# Patient Record
Sex: FEMALE | Race: WHITE
[De-identification: names, ages, dates, MRNs, and addresses within clinical notes are randomized per-mention and may not be internally consistent; named-entity substitution may affect disease eponyms.]

---

## 2018-04-01 ENCOUNTER — HOSPITAL ENCOUNTER (EMERGENCY)
Dept: HOSPITAL 8 - ED | Age: 58
Discharge: HOME | End: 2018-04-01
Payer: COMMERCIAL

## 2018-04-01 VITALS — HEIGHT: 64 IN | WEIGHT: 189.6 LBS | BODY MASS INDEX: 32.37 KG/M2

## 2018-04-01 VITALS — DIASTOLIC BLOOD PRESSURE: 90 MMHG | SYSTOLIC BLOOD PRESSURE: 154 MMHG

## 2018-04-01 DIAGNOSIS — B02.33: Primary | ICD-10-CM

## 2018-04-01 DIAGNOSIS — K21.9: ICD-10-CM

## 2018-04-01 DIAGNOSIS — Z85.3: ICD-10-CM

## 2018-04-01 DIAGNOSIS — J44.9: ICD-10-CM

## 2018-04-01 DIAGNOSIS — Z87.891: ICD-10-CM

## 2018-04-01 PROCEDURE — 99283 EMERGENCY DEPT VISIT LOW MDM: CPT

## 2018-08-15 ENCOUNTER — HOSPITAL ENCOUNTER (OUTPATIENT)
Dept: HOSPITAL 8 - CFH | Age: 58
Discharge: HOME | End: 2018-08-15
Attending: NURSE PRACTITIONER
Payer: COMMERCIAL

## 2018-08-15 DIAGNOSIS — M79.604: Primary | ICD-10-CM

## 2018-08-15 DIAGNOSIS — R60.0: ICD-10-CM

## 2018-08-16 ENCOUNTER — HOSPITAL ENCOUNTER (OUTPATIENT)
Dept: HOSPITAL 8 - RAD | Age: 58
Discharge: HOME | End: 2018-08-16
Attending: NURSE PRACTITIONER
Payer: COMMERCIAL

## 2018-08-16 DIAGNOSIS — M84.451A: Primary | ICD-10-CM

## 2018-08-16 DIAGNOSIS — M79.89: ICD-10-CM

## 2018-08-16 DIAGNOSIS — F17.200: ICD-10-CM

## 2018-08-16 DIAGNOSIS — R59.0: ICD-10-CM

## 2018-08-16 DIAGNOSIS — Z88.0: ICD-10-CM

## 2018-08-16 DIAGNOSIS — Z88.2: ICD-10-CM

## 2018-08-16 PROCEDURE — 72193 CT PELVIS W/DYE: CPT

## 2018-08-20 ENCOUNTER — HOSPITAL ENCOUNTER (OUTPATIENT)
Dept: HOSPITAL 8 - OUT | Age: 58
Discharge: HOME | End: 2018-08-20
Attending: NURSE PRACTITIONER
Payer: COMMERCIAL

## 2018-08-20 VITALS — SYSTOLIC BLOOD PRESSURE: 131 MMHG | DIASTOLIC BLOOD PRESSURE: 68 MMHG

## 2018-08-20 VITALS — HEIGHT: 64 IN | WEIGHT: 195.99 LBS | BODY MASS INDEX: 33.46 KG/M2

## 2018-08-20 DIAGNOSIS — Z88.2: ICD-10-CM

## 2018-08-20 DIAGNOSIS — C83.35: Primary | ICD-10-CM

## 2018-08-20 DIAGNOSIS — Z98.890: ICD-10-CM

## 2018-08-20 DIAGNOSIS — F17.210: ICD-10-CM

## 2018-08-20 DIAGNOSIS — Z88.0: ICD-10-CM

## 2018-08-20 DIAGNOSIS — Z79.899: ICD-10-CM

## 2018-08-20 DIAGNOSIS — Z88.8: ICD-10-CM

## 2018-08-20 PROCEDURE — 88305 TISSUE EXAM BY PATHOLOGIST: CPT

## 2018-08-20 PROCEDURE — 88342 IMHCHEM/IMCYTCHM 1ST ANTB: CPT

## 2018-08-20 PROCEDURE — 88341 IMHCHEM/IMCYTCHM EA ADD ANTB: CPT

## 2018-08-20 PROCEDURE — G0461 IMMUNOHISTO/CYTO CHEM 1ST ST: HCPCS

## 2018-08-20 PROCEDURE — 99156 MOD SED OTH PHYS/QHP 5/>YRS: CPT

## 2018-08-20 PROCEDURE — 99157 MOD SED OTHER PHYS/QHP EA: CPT

## 2018-08-20 PROCEDURE — 88360 TUMOR IMMUNOHISTOCHEM/MANUAL: CPT

## 2018-08-20 PROCEDURE — 76942 ECHO GUIDE FOR BIOPSY: CPT

## 2018-08-20 PROCEDURE — 20206 BIOPSY MUSCLE PERQ NEEDLE: CPT

## 2018-08-30 ENCOUNTER — HOSPITAL ENCOUNTER (INPATIENT)
Dept: HOSPITAL 8 - ED | Age: 58
LOS: 55 days | Discharge: HOME | DRG: 480 | End: 2018-10-24
Attending: FAMILY MEDICINE | Admitting: INTERNAL MEDICINE
Payer: COMMERCIAL

## 2018-08-30 VITALS — HEIGHT: 64 IN | BODY MASS INDEX: 30.3 KG/M2 | WEIGHT: 177.47 LBS

## 2018-08-30 DIAGNOSIS — G89.29: ICD-10-CM

## 2018-08-30 DIAGNOSIS — E83.52: ICD-10-CM

## 2018-08-30 DIAGNOSIS — Z90.710: ICD-10-CM

## 2018-08-30 DIAGNOSIS — M84.451A: Primary | ICD-10-CM

## 2018-08-30 DIAGNOSIS — C50.919: ICD-10-CM

## 2018-08-30 DIAGNOSIS — Z88.2: ICD-10-CM

## 2018-08-30 DIAGNOSIS — I89.0: ICD-10-CM

## 2018-08-30 DIAGNOSIS — Z88.0: ICD-10-CM

## 2018-08-30 DIAGNOSIS — C83.35: ICD-10-CM

## 2018-08-30 DIAGNOSIS — E87.1: ICD-10-CM

## 2018-08-30 DIAGNOSIS — B37.3: ICD-10-CM

## 2018-08-30 DIAGNOSIS — B95.7: ICD-10-CM

## 2018-08-30 DIAGNOSIS — R91.8: ICD-10-CM

## 2018-08-30 DIAGNOSIS — T45.1X5A: ICD-10-CM

## 2018-08-30 DIAGNOSIS — C83.39: ICD-10-CM

## 2018-08-30 DIAGNOSIS — M85.80: ICD-10-CM

## 2018-08-30 DIAGNOSIS — E87.8: ICD-10-CM

## 2018-08-30 DIAGNOSIS — B96.20: ICD-10-CM

## 2018-08-30 DIAGNOSIS — D63.8: ICD-10-CM

## 2018-08-30 DIAGNOSIS — Z16.12: ICD-10-CM

## 2018-08-30 DIAGNOSIS — M79.89: ICD-10-CM

## 2018-08-30 DIAGNOSIS — K21.9: ICD-10-CM

## 2018-08-30 DIAGNOSIS — T81.49XA: ICD-10-CM

## 2018-08-30 DIAGNOSIS — C82.90: ICD-10-CM

## 2018-08-30 DIAGNOSIS — T45.8X5A: ICD-10-CM
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
## 2018-08-30 DIAGNOSIS — C79.51: ICD-10-CM

## 2018-08-30 DIAGNOSIS — J45.909: ICD-10-CM

## 2018-08-30 DIAGNOSIS — Z85.3: ICD-10-CM
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
## 2018-08-30 DIAGNOSIS — D62: ICD-10-CM

## 2018-08-30 DIAGNOSIS — T50.995A: ICD-10-CM

## 2018-08-30 DIAGNOSIS — F41.9: ICD-10-CM

## 2018-08-30 DIAGNOSIS — N17.0: ICD-10-CM

## 2018-08-30 DIAGNOSIS — M16.11: ICD-10-CM

## 2018-08-30 DIAGNOSIS — E87.0: ICD-10-CM

## 2018-08-30 DIAGNOSIS — Z91.19: ICD-10-CM

## 2018-08-30 DIAGNOSIS — D61.818: ICD-10-CM

## 2018-08-30 DIAGNOSIS — Z82.49: ICD-10-CM

## 2018-08-30 DIAGNOSIS — L03.115: ICD-10-CM

## 2018-08-30 DIAGNOSIS — B95.2: ICD-10-CM

## 2018-08-30 DIAGNOSIS — Z15.01: ICD-10-CM

## 2018-08-30 DIAGNOSIS — F17.210: ICD-10-CM

## 2018-08-30 DIAGNOSIS — Z83.3: ICD-10-CM

## 2018-08-30 DIAGNOSIS — Z87.311: ICD-10-CM

## 2018-08-30 DIAGNOSIS — E83.42: ICD-10-CM

## 2018-08-30 DIAGNOSIS — E87.5: ICD-10-CM

## 2018-08-30 DIAGNOSIS — Z80.42: ICD-10-CM

## 2018-08-30 DIAGNOSIS — Z86.19: ICD-10-CM

## 2018-08-30 DIAGNOSIS — D64.81: ICD-10-CM

## 2018-08-30 DIAGNOSIS — E43: ICD-10-CM

## 2018-08-30 DIAGNOSIS — F32.9: ICD-10-CM

## 2018-08-30 DIAGNOSIS — Z92.3: ICD-10-CM

## 2018-08-30 LAB
ALBUMIN SERPL-MCNC: 3.4 G/DL (ref 3.4–5)
ANION GAP SERPL CALC-SCNC: 9 MMOL/L (ref 5–15)
APTT BLD: 24 SECONDS (ref 25–31)
BASOPHILS # BLD AUTO: 0.01 X10^3/UL (ref 0–0.1)
BASOPHILS NFR BLD AUTO: 0 % (ref 0–1)
CALCIUM SERPL-MCNC: 12.4 MG/DL (ref 8.5–10.1)
CHLORIDE SERPL-SCNC: 102 MMOL/L (ref 98–107)
CREAT SERPL-MCNC: 1.78 MG/DL (ref 0.55–1.02)
EOSINOPHIL # BLD AUTO: 0.07 X10^3/UL (ref 0–0.4)
EOSINOPHIL NFR BLD AUTO: 2 % (ref 1–7)
ERYTHROCYTE [DISTWIDTH] IN BLOOD BY AUTOMATED COUNT: 14.5 % (ref 9.6–15.2)
INR PPP: 1.1 (ref 0.93–1.1)
LYMPHOCYTES # BLD AUTO: 0.37 X10^3/UL (ref 1–3.4)
LYMPHOCYTES NFR BLD AUTO: 11 % (ref 22–44)
MCH RBC QN AUTO: 34.3 PG (ref 27–34.8)
MCHC RBC AUTO-ENTMCNC: 34.9 G/DL (ref 32.4–35.8)
MCV RBC AUTO: 98.2 FL (ref 80–100)
MD: NO
MONOCYTES # BLD AUTO: 0.08 X10^3/UL (ref 0.2–0.8)
MONOCYTES NFR BLD AUTO: 2 % (ref 2–9)
NEUTROPHILS # BLD AUTO: 3 X10^3/UL (ref 1.8–6.8)
NEUTROPHILS NFR BLD AUTO: 85 % (ref 42–75)
PLATELET # BLD AUTO: 210 X10^3/UL (ref 130–400)
PMV BLD AUTO: 7.6 FL (ref 7.4–10.4)
PROTHROMBIN TIME: 11.3 SECONDS (ref 9.6–11.5)
RBC # BLD AUTO: 3.04 X10^6/UL (ref 3.82–5.3)

## 2018-08-30 PROCEDURE — 82272 OCCULT BLD FECES 1-3 TESTS: CPT

## 2018-08-30 PROCEDURE — 85610 PROTHROMBIN TIME: CPT

## 2018-08-30 PROCEDURE — 82550 ASSAY OF CK (CPK): CPT

## 2018-08-30 PROCEDURE — 99156 MOD SED OTH PHYS/QHP 5/>YRS: CPT

## 2018-08-30 PROCEDURE — 76937 US GUIDE VASCULAR ACCESS: CPT

## 2018-08-30 PROCEDURE — 76001: CPT

## 2018-08-30 PROCEDURE — 83615 LACTATE (LD) (LDH) ENZYME: CPT

## 2018-08-30 PROCEDURE — C1713 ANCHOR/SCREW BN/BN,TIS/BN: HCPCS

## 2018-08-30 PROCEDURE — 88311 DECALCIFY TISSUE: CPT

## 2018-08-30 PROCEDURE — 87205 SMEAR GRAM STAIN: CPT

## 2018-08-30 PROCEDURE — 88264 CHROMOSOME ANALYSIS 20-25: CPT

## 2018-08-30 PROCEDURE — 84439 ASSAY OF FREE THYROXINE: CPT

## 2018-08-30 PROCEDURE — 87186 SC STD MICRODIL/AGAR DIL: CPT

## 2018-08-30 PROCEDURE — 36561 INSERT TUNNELED CV CATH: CPT

## 2018-08-30 PROCEDURE — 85025 COMPLETE CBC W/AUTO DIFF WBC: CPT

## 2018-08-30 PROCEDURE — 72170 X-RAY EXAM OF PELVIS: CPT

## 2018-08-30 PROCEDURE — 38222 DX BONE MARROW BX & ASPIR: CPT

## 2018-08-30 PROCEDURE — 87070 CULTURE OTHR SPECIMN AEROBIC: CPT

## 2018-08-30 PROCEDURE — 88342 IMHCHEM/IMCYTCHM 1ST ANTB: CPT

## 2018-08-30 PROCEDURE — 86900 BLOOD TYPING SEROLOGIC ABO: CPT

## 2018-08-30 PROCEDURE — 84550 ASSAY OF BLOOD/URIC ACID: CPT

## 2018-08-30 PROCEDURE — 71250 CT THORAX DX C-: CPT

## 2018-08-30 PROCEDURE — 96374 THER/PROPH/DIAG INJ IV PUSH: CPT

## 2018-08-30 PROCEDURE — 85097 BONE MARROW INTERPRETATION: CPT

## 2018-08-30 PROCEDURE — S0028 INJECTION, FAMOTIDINE, 20 MG: HCPCS

## 2018-08-30 PROCEDURE — 80048 BASIC METABOLIC PNL TOTAL CA: CPT

## 2018-08-30 PROCEDURE — 86706 HEP B SURFACE ANTIBODY: CPT

## 2018-08-30 PROCEDURE — 86140 C-REACTIVE PROTEIN: CPT

## 2018-08-30 PROCEDURE — 83550 IRON BINDING TEST: CPT

## 2018-08-30 PROCEDURE — 73552 X-RAY EXAM OF FEMUR 2/>: CPT

## 2018-08-30 PROCEDURE — 73590 X-RAY EXAM OF LOWER LEG: CPT

## 2018-08-30 PROCEDURE — 85651 RBC SED RATE NONAUTOMATED: CPT

## 2018-08-30 PROCEDURE — 73501 X-RAY EXAM HIP UNI 1 VIEW: CPT

## 2018-08-30 PROCEDURE — 85730 THROMBOPLASTIN TIME PARTIAL: CPT

## 2018-08-30 PROCEDURE — 88341 IMHCHEM/IMCYTCHM EA ADD ANTB: CPT

## 2018-08-30 PROCEDURE — 86803 HEPATITIS C AB TEST: CPT

## 2018-08-30 PROCEDURE — 99157 MOD SED OTHER PHYS/QHP EA: CPT

## 2018-08-30 PROCEDURE — 84100 ASSAY OF PHOSPHORUS: CPT

## 2018-08-30 PROCEDURE — P9040 RBC LEUKOREDUCED IRRADIATED: HCPCS

## 2018-08-30 PROCEDURE — 86708 HEPATITIS A ANTIBODY: CPT

## 2018-08-30 PROCEDURE — 62270 DX LMBR SPI PNXR: CPT

## 2018-08-30 PROCEDURE — 84443 ASSAY THYROID STIM HORMONE: CPT

## 2018-08-30 PROCEDURE — 77001 FLUOROGUIDE FOR VEIN DEVICE: CPT

## 2018-08-30 PROCEDURE — 88307 TISSUE EXAM BY PATHOLOGIST: CPT

## 2018-08-30 PROCEDURE — 87077 CULTURE AEROBIC IDENTIFY: CPT

## 2018-08-30 PROCEDURE — 88237 TISSUE CULTURE BONE MARROW: CPT

## 2018-08-30 PROCEDURE — G0461 IMMUNOHISTO/CYTO CHEM 1ST ST: HCPCS

## 2018-08-30 PROCEDURE — 88313 SPECIAL STAINS GROUP 2: CPT

## 2018-08-30 PROCEDURE — 96376 TX/PRO/DX INJ SAME DRUG ADON: CPT

## 2018-08-30 PROCEDURE — 88280 CHROMOSOME KARYOTYPE STUDY: CPT

## 2018-08-30 PROCEDURE — 99285 EMERGENCY DEPT VISIT HI MDM: CPT

## 2018-08-30 PROCEDURE — 77012 CT SCAN FOR NEEDLE BIOPSY: CPT

## 2018-08-30 PROCEDURE — 88305 TISSUE EXAM BY PATHOLOGIST: CPT

## 2018-08-30 PROCEDURE — C1788 PORT, INDWELLING, IMP: HCPCS

## 2018-08-30 PROCEDURE — 87040 BLOOD CULTURE FOR BACTERIA: CPT

## 2018-08-30 PROCEDURE — 87340 HEPATITIS B SURFACE AG IA: CPT

## 2018-08-30 PROCEDURE — 88108 CYTOPATH CONCENTRATE TECH: CPT

## 2018-08-30 PROCEDURE — 93005 ELECTROCARDIOGRAM TRACING: CPT

## 2018-08-30 PROCEDURE — 86704 HEP B CORE ANTIBODY TOTAL: CPT

## 2018-08-30 PROCEDURE — 82040 ASSAY OF SERUM ALBUMIN: CPT

## 2018-08-30 PROCEDURE — 36415 COLL VENOUS BLD VENIPUNCTURE: CPT

## 2018-08-30 PROCEDURE — 93306 TTE W/DOPPLER COMPLETE: CPT

## 2018-08-30 PROCEDURE — 80053 COMPREHEN METABOLIC PANEL: CPT

## 2018-08-30 PROCEDURE — 36430 TRANSFUSION BLD/BLD COMPNT: CPT

## 2018-08-30 PROCEDURE — 86850 RBC ANTIBODY SCREEN: CPT

## 2018-08-30 PROCEDURE — 85060 BLOOD SMEAR INTERPRETATION: CPT

## 2018-08-30 PROCEDURE — 86923 COMPATIBILITY TEST ELECTRIC: CPT

## 2018-08-30 PROCEDURE — 82330 ASSAY OF CALCIUM: CPT

## 2018-08-30 PROCEDURE — 83540 ASSAY OF IRON: CPT

## 2018-08-30 PROCEDURE — 83735 ASSAY OF MAGNESIUM: CPT

## 2018-08-30 PROCEDURE — 71045 X-RAY EXAM CHEST 1 VIEW: CPT

## 2018-08-30 PROCEDURE — 74176 CT ABD & PELVIS W/O CONTRAST: CPT

## 2018-08-30 RX ADMIN — MORPHINE SULFATE PRN MG: 4 INJECTION INTRAVENOUS at 22:23

## 2018-08-30 RX ADMIN — MORPHINE SULFATE PRN MG: 4 INJECTION INTRAVENOUS at 23:16

## 2018-08-31 VITALS — DIASTOLIC BLOOD PRESSURE: 70 MMHG | SYSTOLIC BLOOD PRESSURE: 158 MMHG

## 2018-08-31 VITALS — DIASTOLIC BLOOD PRESSURE: 74 MMHG | SYSTOLIC BLOOD PRESSURE: 165 MMHG

## 2018-08-31 VITALS — SYSTOLIC BLOOD PRESSURE: 155 MMHG | DIASTOLIC BLOOD PRESSURE: 78 MMHG

## 2018-08-31 VITALS — SYSTOLIC BLOOD PRESSURE: 148 MMHG | DIASTOLIC BLOOD PRESSURE: 81 MMHG

## 2018-08-31 VITALS — DIASTOLIC BLOOD PRESSURE: 73 MMHG | SYSTOLIC BLOOD PRESSURE: 152 MMHG

## 2018-08-31 VITALS — SYSTOLIC BLOOD PRESSURE: 186 MMHG | DIASTOLIC BLOOD PRESSURE: 90 MMHG

## 2018-08-31 LAB
% IRON SATURATION: 23 % (ref 20–55)
ALBUMIN SERPL-MCNC: 3.1 G/DL (ref 3.4–5)
ALP SERPL-CCNC: 75 U/L (ref 45–117)
ALT SERPL-CCNC: 15 U/L (ref 12–78)
ANION GAP SERPL CALC-SCNC: 10 MMOL/L (ref 5–15)
BILIRUB SERPL-MCNC: 0.3 MG/DL (ref 0.2–1)
CALCIUM SERPL-MCNC: 12 MG/DL (ref 8.5–10.1)
CHLORIDE SERPL-SCNC: 103 MMOL/L (ref 98–107)
CREAT SERPL-MCNC: 1.6 MG/DL (ref 0.55–1.02)
IRON LEVEL: 56 MCG/DL (ref 50–170)
PROT SERPL-MCNC: 6.5 G/DL (ref 6.4–8.2)
T4 FREE SERPL-MCNC: 1.31 NG/DL (ref 0.76–1.46)
TIBC SERPL-MCNC: 245 MCG/DL (ref 250–450)

## 2018-08-31 PROCEDURE — 0QS606Z REPOSITION RIGHT UPPER FEMUR WITH INTRAMEDULLARY INTERNAL FIXATION DEVICE, OPEN APPROACH: ICD-10-PCS | Performed by: ORTHOPAEDIC SURGERY

## 2018-08-31 PROCEDURE — 07DR3ZX EXTRACTION OF ILIAC BONE MARROW, PERCUTANEOUS APPROACH, DIAGNOSTIC: ICD-10-PCS | Performed by: RADIOLOGY

## 2018-08-31 PROCEDURE — 07DR3ZZ EXTRACTION OF ILIAC BONE MARROW, PERCUTANEOUS APPROACH: ICD-10-PCS | Performed by: RADIOLOGY

## 2018-08-31 RX ADMIN — MORPHINE SULFATE PRN MG: 4 INJECTION INTRAVENOUS at 21:49

## 2018-08-31 RX ADMIN — MAGNESIUM OXIDE SCH MG: 400 TABLET ORAL at 11:53

## 2018-08-31 RX ADMIN — LACTULOSE SCH GM: 10 SOLUTION ORAL at 22:50

## 2018-08-31 RX ADMIN — KETOROLAC TROMETHAMINE PRN MG: 30 INJECTION, SOLUTION INTRAMUSCULAR at 04:32

## 2018-08-31 RX ADMIN — HYDROMORPHONE HYDROCHLORIDE PRN MG: 2 INJECTION INTRAMUSCULAR; INTRAVENOUS; SUBCUTANEOUS at 16:14

## 2018-08-31 RX ADMIN — MORPHINE SULFATE PRN MG: 4 INJECTION INTRAVENOUS at 21:57

## 2018-08-31 RX ADMIN — SODIUM CHLORIDE SCH MLS/HR: 0.9 INJECTION, SOLUTION INTRAVENOUS at 23:20

## 2018-08-31 RX ADMIN — HYDROMORPHONE HYDROCHLORIDE PRN MG: 2 INJECTION INTRAMUSCULAR; INTRAVENOUS; SUBCUTANEOUS at 10:05

## 2018-08-31 RX ADMIN — PANTOPRAZOLE SODIUM SCH MG: 40 TABLET, DELAYED RELEASE ORAL at 11:53

## 2018-08-31 RX ADMIN — PROMETHAZINE HYDROCHLORIDE PRN MG: 25 INJECTION INTRAMUSCULAR; INTRAVENOUS at 04:49

## 2018-08-31 RX ADMIN — FENTANYL CITRATE PRN MCG: 50 INJECTION INTRAMUSCULAR; INTRAVENOUS at 21:10

## 2018-08-31 RX ADMIN — HEPARIN SODIUM SCH UNITS: 5000 INJECTION, SOLUTION INTRAVENOUS; SUBCUTANEOUS at 16:12

## 2018-08-31 RX ADMIN — HEPARIN SODIUM SCH UNITS: 5000 INJECTION, SOLUTION INTRAVENOUS; SUBCUTANEOUS at 06:02

## 2018-08-31 RX ADMIN — MORPHINE SULFATE PRN MG: 4 INJECTION INTRAVENOUS at 21:41

## 2018-08-31 RX ADMIN — CHOLECALCIFEROL TAB 125 MCG (5000 UNIT) SCH UNIT: 125 TAB at 11:53

## 2018-08-31 RX ADMIN — ONDANSETRON PRN MG: 4 TABLET, ORALLY DISINTEGRATING ORAL at 04:27

## 2018-08-31 RX ADMIN — LACTULOSE SCH GM: 10 SOLUTION ORAL at 09:00

## 2018-08-31 RX ADMIN — ALLOPURINOL SCH MG: 100 TABLET ORAL at 11:52

## 2018-08-31 RX ADMIN — HYDROMORPHONE HYDROCHLORIDE PRN MG: 2 INJECTION INTRAMUSCULAR; INTRAVENOUS; SUBCUTANEOUS at 18:42

## 2018-08-31 RX ADMIN — KETOROLAC TROMETHAMINE PRN MG: 30 INJECTION, SOLUTION INTRAMUSCULAR at 13:38

## 2018-08-31 RX ADMIN — B-COMPLEX W/ C & FOLIC ACID TAB SCH TAB: TAB at 11:53

## 2018-08-31 RX ADMIN — PANTOPRAZOLE SODIUM SCH MG: 40 TABLET, DELAYED RELEASE ORAL at 22:47

## 2018-08-31 RX ADMIN — FENTANYL CITRATE PRN MCG: 50 INJECTION INTRAMUSCULAR; INTRAVENOUS at 21:05

## 2018-09-01 VITALS — DIASTOLIC BLOOD PRESSURE: 78 MMHG | SYSTOLIC BLOOD PRESSURE: 138 MMHG

## 2018-09-01 VITALS — DIASTOLIC BLOOD PRESSURE: 73 MMHG | SYSTOLIC BLOOD PRESSURE: 157 MMHG

## 2018-09-01 VITALS — SYSTOLIC BLOOD PRESSURE: 163 MMHG | DIASTOLIC BLOOD PRESSURE: 77 MMHG

## 2018-09-01 VITALS — SYSTOLIC BLOOD PRESSURE: 121 MMHG | DIASTOLIC BLOOD PRESSURE: 75 MMHG

## 2018-09-01 LAB
<PLATELET ESTIMATE>: ADEQUATE
<PLT MORPHOLOGY>: (no result)
ALBUMIN SERPL-MCNC: 2.7 G/DL (ref 3.4–5)
ALP SERPL-CCNC: 69 U/L (ref 45–117)
ALT SERPL-CCNC: 14 U/L (ref 12–78)
ANION GAP SERPL CALC-SCNC: 7 MMOL/L (ref 5–15)
ANISOCYTOSIS BLD QL SMEAR: (no result)
BASO STIPL BLD QL SMEAR: (no result)
BASOPHILS # BLD AUTO: 0 X10^3/UL (ref 0–0.1)
BASOPHILS NFR BLD AUTO: 0 % (ref 0–1)
BILIRUB SERPL-MCNC: 0.3 MG/DL (ref 0.2–1)
CALCIUM SERPL-MCNC: 11.4 MG/DL (ref 8.5–10.1)
CHLORIDE SERPL-SCNC: 107 MMOL/L (ref 98–107)
CREAT SERPL-MCNC: 1.33 MG/DL (ref 0.55–1.02)
EOSINOPHIL # BLD AUTO: 0 X10^3/UL (ref 0–0.4)
EOSINOPHIL NFR BLD AUTO: 0 % (ref 1–7)
ERYTHROCYTE [DISTWIDTH] IN BLOOD BY AUTOMATED COUNT: 13.7 % (ref 9.6–15.2)
LYMPHOCYTES # BLD AUTO: 0.22 X10^3/UL (ref 1–3.4)
LYMPHOCYTES NFR BLD AUTO: 8 % (ref 22–44)
MCH RBC QN AUTO: 33.7 PG (ref 27–34.8)
MCHC RBC AUTO-ENTMCNC: 34.5 G/DL (ref 32.4–35.8)
MCV RBC AUTO: 97.8 FL (ref 80–100)
MD: (no result)
MONOCYTES # BLD AUTO: 0.06 X10^3/UL (ref 0.2–0.8)
MONOCYTES NFR BLD AUTO: 2 % (ref 2–9)
NEUTROPHILS # BLD AUTO: 2.57 X10^3/UL (ref 1.8–6.8)
NEUTROPHILS NFR BLD AUTO: 90 % (ref 42–75)
OVALOCYTES BLD QL SMEAR: (no result)
PLATELET # BLD AUTO: 197 X10^3/UL (ref 130–400)
PMV BLD AUTO: 7.6 FL (ref 7.4–10.4)
POLYCHROMASIA BLD QL SMEAR: (no result)
PROT SERPL-MCNC: 5.8 G/DL (ref 6.4–8.2)
RBC # BLD AUTO: 2.38 X10^6/UL (ref 3.82–5.3)

## 2018-09-01 RX ADMIN — SODIUM CHLORIDE, PRESERVATIVE FREE SCH ML: 5 INJECTION INTRAVENOUS at 20:59

## 2018-09-01 RX ADMIN — HYDROMORPHONE HYDROCHLORIDE PRN MG: 2 INJECTION INTRAMUSCULAR; INTRAVENOUS; SUBCUTANEOUS at 21:56

## 2018-09-01 RX ADMIN — LACTULOSE SCH GM: 10 SOLUTION ORAL at 09:00

## 2018-09-01 RX ADMIN — HEPARIN SODIUM SCH UNITS: 5000 INJECTION, SOLUTION INTRAVENOUS; SUBCUTANEOUS at 10:41

## 2018-09-01 RX ADMIN — CEFAZOLIN SODIUM SCH MLS/HR: 2 INJECTION, SOLUTION INTRAVENOUS at 04:34

## 2018-09-01 RX ADMIN — ALLOPURINOL SCH MG: 100 TABLET ORAL at 10:41

## 2018-09-01 RX ADMIN — OXYCODONE HYDROCHLORIDE PRN MG: 5 TABLET ORAL at 10:59

## 2018-09-01 RX ADMIN — PANTOPRAZOLE SODIUM SCH MG: 40 TABLET, DELAYED RELEASE ORAL at 20:59

## 2018-09-01 RX ADMIN — SODIUM CHLORIDE SCH MLS/HR: 0.9 INJECTION, SOLUTION INTRAVENOUS at 04:35

## 2018-09-01 RX ADMIN — CEFAZOLIN SODIUM SCH MLS/HR: 2 INJECTION, SOLUTION INTRAVENOUS at 12:59

## 2018-09-01 RX ADMIN — HYDROMORPHONE HYDROCHLORIDE PRN MG: 2 INJECTION INTRAMUSCULAR; INTRAVENOUS; SUBCUTANEOUS at 13:36

## 2018-09-01 RX ADMIN — OXYCODONE HYDROCHLORIDE PRN MG: 5 TABLET ORAL at 17:46

## 2018-09-01 RX ADMIN — MAGNESIUM OXIDE SCH MG: 400 TABLET ORAL at 10:41

## 2018-09-01 RX ADMIN — POLYETHYLENE GLYCOL 3350 PRN GM: 17 POWDER, FOR SOLUTION ORAL at 10:58

## 2018-09-01 RX ADMIN — HYDROMORPHONE HYDROCHLORIDE PRN MG: 2 INJECTION INTRAMUSCULAR; INTRAVENOUS; SUBCUTANEOUS at 18:09

## 2018-09-01 RX ADMIN — SODIUM CHLORIDE SCH MLS/HR: 0.9 INJECTION, SOLUTION INTRAVENOUS at 18:57

## 2018-09-01 RX ADMIN — CHOLECALCIFEROL TAB 125 MCG (5000 UNIT) SCH UNIT: 125 TAB at 10:41

## 2018-09-01 RX ADMIN — LACTULOSE SCH GM: 10 SOLUTION ORAL at 20:58

## 2018-09-01 RX ADMIN — HEPARIN SODIUM SCH UNITS: 5000 INJECTION, SOLUTION INTRAVENOUS; SUBCUTANEOUS at 20:58

## 2018-09-01 RX ADMIN — SODIUM CHLORIDE, PRESERVATIVE FREE SCH ML: 5 INJECTION INTRAVENOUS at 10:38

## 2018-09-01 RX ADMIN — B-COMPLEX W/ C & FOLIC ACID TAB SCH TAB: TAB at 10:41

## 2018-09-01 RX ADMIN — PANTOPRAZOLE SODIUM SCH MG: 40 TABLET, DELAYED RELEASE ORAL at 10:41

## 2018-09-02 VITALS — DIASTOLIC BLOOD PRESSURE: 73 MMHG | SYSTOLIC BLOOD PRESSURE: 123 MMHG

## 2018-09-02 VITALS — SYSTOLIC BLOOD PRESSURE: 143 MMHG | DIASTOLIC BLOOD PRESSURE: 77 MMHG

## 2018-09-02 VITALS — SYSTOLIC BLOOD PRESSURE: 154 MMHG | DIASTOLIC BLOOD PRESSURE: 78 MMHG

## 2018-09-02 VITALS — DIASTOLIC BLOOD PRESSURE: 66 MMHG | SYSTOLIC BLOOD PRESSURE: 156 MMHG

## 2018-09-02 VITALS — SYSTOLIC BLOOD PRESSURE: 148 MMHG | DIASTOLIC BLOOD PRESSURE: 69 MMHG

## 2018-09-02 VITALS — DIASTOLIC BLOOD PRESSURE: 78 MMHG | SYSTOLIC BLOOD PRESSURE: 154 MMHG

## 2018-09-02 VITALS — SYSTOLIC BLOOD PRESSURE: 153 MMHG | DIASTOLIC BLOOD PRESSURE: 81 MMHG

## 2018-09-02 VITALS — DIASTOLIC BLOOD PRESSURE: 74 MMHG | SYSTOLIC BLOOD PRESSURE: 145 MMHG

## 2018-09-02 VITALS — SYSTOLIC BLOOD PRESSURE: 127 MMHG | DIASTOLIC BLOOD PRESSURE: 64 MMHG

## 2018-09-02 VITALS — SYSTOLIC BLOOD PRESSURE: 155 MMHG | DIASTOLIC BLOOD PRESSURE: 82 MMHG

## 2018-09-02 VITALS — DIASTOLIC BLOOD PRESSURE: 78 MMHG | SYSTOLIC BLOOD PRESSURE: 164 MMHG

## 2018-09-02 VITALS — DIASTOLIC BLOOD PRESSURE: 77 MMHG | SYSTOLIC BLOOD PRESSURE: 147 MMHG

## 2018-09-02 LAB
<PLATELET ESTIMATE>: ADEQUATE
<PLATELET ESTIMATE>: ADEQUATE
<PLT MORPHOLOGY>: (no result)
<PLT MORPHOLOGY>: (no result)
ALBUMIN SERPL-MCNC: 2.4 G/DL (ref 3.4–5)
ALP SERPL-CCNC: 59 U/L (ref 45–117)
ALT SERPL-CCNC: 11 U/L (ref 12–78)
ANION GAP SERPL CALC-SCNC: 8 MMOL/L (ref 5–15)
ANISOCYTOSIS BLD QL SMEAR: (no result)
ANISOCYTOSIS BLD QL SMEAR: (no result)
BAND#(MANUAL): 0.02 X10^3/UL
BILIRUB SERPL-MCNC: 0.3 MG/DL (ref 0.2–1)
CALCIUM SERPL-MCNC: 9.3 MG/DL (ref 8.5–10.1)
CHLORIDE SERPL-SCNC: 104 MMOL/L (ref 98–107)
CREAT SERPL-MCNC: 1.16 MG/DL (ref 0.55–1.02)
EOS#(MANUAL): 0.02 X10^3/UL (ref 0–0.4)
EOS% (MANUAL): 1 % (ref 1–7)
ERYTHROCYTE [DISTWIDTH] IN BLOOD BY AUTOMATED COUNT: 14.2 % (ref 9.6–15.2)
ERYTHROCYTE [DISTWIDTH] IN BLOOD BY AUTOMATED COUNT: 14.3 % (ref 9.6–15.2)
LYMPH#(MANUAL): 0.19 X10^3/UL (ref 1–3.4)
LYMPH#(MANUAL): 0.22 X10^3/UL (ref 1–3.4)
LYMPHS% (MANUAL): 12 % (ref 22–44)
LYMPHS% (MANUAL): 13 % (ref 22–44)
MCH RBC QN AUTO: 33.9 PG (ref 27–34.8)
MCH RBC QN AUTO: 34.5 PG (ref 27–34.8)
MCHC RBC AUTO-ENTMCNC: 35.4 G/DL (ref 32.4–35.8)
MCHC RBC AUTO-ENTMCNC: 35.6 G/DL (ref 32.4–35.8)
MCV RBC AUTO: 95.6 FL (ref 80–100)
MCV RBC AUTO: 96.8 FL (ref 80–100)
MD: YES
MD: YES
MONOS#(MANUAL): 0.03 X10^3/UL (ref 0.3–2.7)
MONOS#(MANUAL): 0.05 X10^3/UL (ref 0.3–2.7)
MONOS% (MANUAL): 2 % (ref 2–9)
MONOS% (MANUAL): 3 % (ref 2–9)
NEUTS BAND NFR BLD: 1 % (ref 0–7)
OTHER CELLS # (MANUAL): 0.02 X10^3/UL (ref 0–0)
OTHER CELLS % (MANUAL): 1 % (ref 0–0)
OVALOCYTES BLD QL SMEAR: (no result)
OVALOCYTES BLD QL SMEAR: (no result)
PLATELET # BLD AUTO: 155 X10^3/UL (ref 130–400)
PLATELET # BLD AUTO: 157 X10^3/UL (ref 130–400)
PMV BLD AUTO: 6.8 FL (ref 7.4–10.4)
PMV BLD AUTO: 7.4 FL (ref 7.4–10.4)
POLYCHROMASIA BLD QL SMEAR: (no result)
POLYCHROMASIA BLD QL SMEAR: (no result)
PROT SERPL-MCNC: 5.1 G/DL (ref 6.4–8.2)
RBC # BLD AUTO: 1.91 X10^6/UL (ref 3.82–5.3)
RBC # BLD AUTO: 1.97 X10^6/UL (ref 3.82–5.3)
SEG#(MANUAL): 1.34 X10^3/UL (ref 1.8–6.8)
SEG#(MANUAL): 1.41 X10^3/UL (ref 1.8–6.8)
SEGS% (MANUAL): 83 % (ref 42–75)
SEGS% (MANUAL): 84 % (ref 42–75)

## 2018-09-02 PROCEDURE — 30233N1 TRANSFUSION OF NONAUTOLOGOUS RED BLOOD CELLS INTO PERIPHERAL VEIN, PERCUTANEOUS APPROACH: ICD-10-PCS | Performed by: FAMILY MEDICINE

## 2018-09-02 RX ADMIN — MAGNESIUM OXIDE SCH MG: 400 TABLET ORAL at 11:10

## 2018-09-02 RX ADMIN — PANTOPRAZOLE SODIUM SCH MG: 40 TABLET, DELAYED RELEASE ORAL at 11:10

## 2018-09-02 RX ADMIN — PANTOPRAZOLE SODIUM SCH MG: 40 TABLET, DELAYED RELEASE ORAL at 21:18

## 2018-09-02 RX ADMIN — HEPARIN SODIUM SCH UNITS: 5000 INJECTION, SOLUTION INTRAVENOUS; SUBCUTANEOUS at 11:12

## 2018-09-02 RX ADMIN — HYDROMORPHONE HYDROCHLORIDE PRN MG: 2 INJECTION INTRAMUSCULAR; INTRAVENOUS; SUBCUTANEOUS at 10:58

## 2018-09-02 RX ADMIN — OXYCODONE HYDROCHLORIDE PRN MG: 5 TABLET ORAL at 14:43

## 2018-09-02 RX ADMIN — CHOLECALCIFEROL TAB 125 MCG (5000 UNIT) SCH UNIT: 125 TAB at 11:13

## 2018-09-02 RX ADMIN — LACTULOSE SCH GM: 10 SOLUTION ORAL at 21:18

## 2018-09-02 RX ADMIN — ALLOPURINOL SCH MG: 100 TABLET ORAL at 11:08

## 2018-09-02 RX ADMIN — HEPARIN SODIUM SCH UNITS: 5000 INJECTION, SOLUTION INTRAVENOUS; SUBCUTANEOUS at 22:50

## 2018-09-02 RX ADMIN — POTASSIUM CHLORIDE SCH MEQ: 20 TABLET, EXTENDED RELEASE ORAL at 11:09

## 2018-09-02 RX ADMIN — SODIUM CHLORIDE, PRESERVATIVE FREE SCH ML: 5 INJECTION INTRAVENOUS at 21:19

## 2018-09-02 RX ADMIN — OXYCODONE HYDROCHLORIDE PRN MG: 5 TABLET ORAL at 05:36

## 2018-09-02 RX ADMIN — SODIUM CHLORIDE, PRESERVATIVE FREE SCH ML: 5 INJECTION INTRAVENOUS at 09:00

## 2018-09-02 RX ADMIN — SODIUM CHLORIDE SCH MLS/HR: 0.9 INJECTION, SOLUTION INTRAVENOUS at 02:36

## 2018-09-02 RX ADMIN — OXYCODONE HYDROCHLORIDE PRN MG: 5 TABLET ORAL at 19:42

## 2018-09-02 RX ADMIN — POTASSIUM CHLORIDE SCH MEQ: 20 TABLET, EXTENDED RELEASE ORAL at 14:44

## 2018-09-02 RX ADMIN — LACTULOSE SCH GM: 10 SOLUTION ORAL at 11:10

## 2018-09-02 RX ADMIN — OXYCODONE HYDROCHLORIDE PRN MG: 5 TABLET ORAL at 01:06

## 2018-09-02 RX ADMIN — B-COMPLEX W/ C & FOLIC ACID TAB SCH TAB: TAB at 11:11

## 2018-09-03 VITALS — DIASTOLIC BLOOD PRESSURE: 79 MMHG | SYSTOLIC BLOOD PRESSURE: 155 MMHG

## 2018-09-03 VITALS — DIASTOLIC BLOOD PRESSURE: 79 MMHG | SYSTOLIC BLOOD PRESSURE: 148 MMHG

## 2018-09-03 VITALS — DIASTOLIC BLOOD PRESSURE: 89 MMHG | SYSTOLIC BLOOD PRESSURE: 162 MMHG

## 2018-09-03 VITALS — DIASTOLIC BLOOD PRESSURE: 83 MMHG | SYSTOLIC BLOOD PRESSURE: 166 MMHG

## 2018-09-03 LAB
<PLATELET ESTIMATE>: ADEQUATE
<PLT MORPHOLOGY>: (no result)
ALBUMIN SERPL-MCNC: 2.3 G/DL (ref 3.4–5)
ALP SERPL-CCNC: 61 U/L (ref 45–117)
ALT SERPL-CCNC: 11 U/L (ref 12–78)
ANION GAP SERPL CALC-SCNC: 6 MMOL/L (ref 5–15)
ANISOCYTOSIS BLD QL SMEAR: (no result)
BAND#(MANUAL): 0.05 X10^3/UL
BILIRUB SERPL-MCNC: 0.5 MG/DL (ref 0.2–1)
CALCIUM SERPL-MCNC: 8.6 MG/DL (ref 8.5–10.1)
CHLORIDE SERPL-SCNC: 108 MMOL/L (ref 98–107)
CREAT SERPL-MCNC: 1.01 MG/DL (ref 0.55–1.02)
EOS#(MANUAL): 0.04 X10^3/UL (ref 0–0.4)
EOS% (MANUAL): 2 % (ref 1–7)
ERYTHROCYTE [DISTWIDTH] IN BLOOD BY AUTOMATED COUNT: 15.7 % (ref 9.6–15.2)
GFR SERPL CREATININE-BSD FRML MDRD: NEGATIVE ML/MIN/{1.73_M2}
LYMPH#(MANUAL): 0.32 X10^3/UL (ref 1–3.4)
LYMPHS% (MANUAL): 18 % (ref 22–44)
MCH RBC QN AUTO: 33.8 PG (ref 27–34.8)
MCHC RBC AUTO-ENTMCNC: 35.7 G/DL (ref 32.4–35.8)
MCV RBC AUTO: 94.9 FL (ref 80–100)
MD: YES
MONOS#(MANUAL): 0.02 X10^3/UL (ref 0.3–2.7)
MONOS% (MANUAL): 1 % (ref 2–9)
NEUTS BAND NFR BLD: 3 % (ref 0–7)
OVALOCYTES BLD QL SMEAR: (no result)
PLATELET # BLD AUTO: 154 X10^3/UL (ref 130–400)
PMV BLD AUTO: 7.3 FL (ref 7.4–10.4)
POLYCHROMASIA BLD QL SMEAR: (no result)
PROT SERPL-MCNC: 5.2 G/DL (ref 6.4–8.2)
RBC # BLD AUTO: 2.83 X10^6/UL (ref 3.82–5.3)
SEG#(MANUAL): 1.37 X10^3/UL (ref 1.8–6.8)
SEGS% (MANUAL): 76 % (ref 42–75)

## 2018-09-03 RX ADMIN — OXYCODONE HYDROCHLORIDE PRN MG: 5 TABLET ORAL at 23:50

## 2018-09-03 RX ADMIN — B-COMPLEX W/ C & FOLIC ACID TAB SCH TAB: TAB at 08:59

## 2018-09-03 RX ADMIN — OXYCODONE HYDROCHLORIDE PRN MG: 5 TABLET ORAL at 04:13

## 2018-09-03 RX ADMIN — OXYCODONE HYDROCHLORIDE PRN MG: 5 TABLET ORAL at 19:34

## 2018-09-03 RX ADMIN — PANTOPRAZOLE SODIUM SCH MG: 40 TABLET, DELAYED RELEASE ORAL at 21:42

## 2018-09-03 RX ADMIN — ONDANSETRON PRN MG: 4 TABLET, ORALLY DISINTEGRATING ORAL at 19:34

## 2018-09-03 RX ADMIN — LACTULOSE SCH GM: 10 SOLUTION ORAL at 21:42

## 2018-09-03 RX ADMIN — MAGNESIUM OXIDE SCH MG: 400 TABLET ORAL at 08:59

## 2018-09-03 RX ADMIN — OXYCODONE HYDROCHLORIDE PRN MG: 5 TABLET ORAL at 16:06

## 2018-09-03 RX ADMIN — ONDANSETRON PRN MG: 4 TABLET, ORALLY DISINTEGRATING ORAL at 23:51

## 2018-09-03 RX ADMIN — HEPARIN SODIUM SCH UNITS: 5000 INJECTION, SOLUTION INTRAVENOUS; SUBCUTANEOUS at 21:42

## 2018-09-03 RX ADMIN — POLYETHYLENE GLYCOL 3350 PRN GM: 17 POWDER, FOR SOLUTION ORAL at 09:08

## 2018-09-03 RX ADMIN — LACTULOSE SCH GM: 10 SOLUTION ORAL at 08:59

## 2018-09-03 RX ADMIN — OXYCODONE HYDROCHLORIDE PRN MG: 5 TABLET ORAL at 11:51

## 2018-09-03 RX ADMIN — SODIUM CHLORIDE, PRESERVATIVE FREE SCH ML: 5 INJECTION INTRAVENOUS at 11:52

## 2018-09-03 RX ADMIN — HEPARIN SODIUM SCH UNITS: 5000 INJECTION, SOLUTION INTRAVENOUS; SUBCUTANEOUS at 11:47

## 2018-09-03 RX ADMIN — SODIUM CHLORIDE, PRESERVATIVE FREE SCH ML: 5 INJECTION INTRAVENOUS at 21:42

## 2018-09-03 RX ADMIN — CHOLECALCIFEROL TAB 125 MCG (5000 UNIT) SCH UNIT: 125 TAB at 08:59

## 2018-09-03 RX ADMIN — PANTOPRAZOLE SODIUM SCH MG: 40 TABLET, DELAYED RELEASE ORAL at 08:59

## 2018-09-03 RX ADMIN — OXYCODONE HYDROCHLORIDE PRN MG: 5 TABLET ORAL at 00:11

## 2018-09-03 RX ADMIN — ALLOPURINOL SCH MG: 100 TABLET ORAL at 08:59

## 2018-09-04 VITALS — DIASTOLIC BLOOD PRESSURE: 66 MMHG | SYSTOLIC BLOOD PRESSURE: 148 MMHG

## 2018-09-04 VITALS — DIASTOLIC BLOOD PRESSURE: 58 MMHG | SYSTOLIC BLOOD PRESSURE: 126 MMHG

## 2018-09-04 VITALS — SYSTOLIC BLOOD PRESSURE: 151 MMHG | DIASTOLIC BLOOD PRESSURE: 84 MMHG

## 2018-09-04 VITALS — DIASTOLIC BLOOD PRESSURE: 78 MMHG | SYSTOLIC BLOOD PRESSURE: 150 MMHG

## 2018-09-04 VITALS — SYSTOLIC BLOOD PRESSURE: 150 MMHG | DIASTOLIC BLOOD PRESSURE: 74 MMHG

## 2018-09-04 RX ADMIN — B-COMPLEX W/ C & FOLIC ACID TAB SCH TAB: TAB at 10:10

## 2018-09-04 RX ADMIN — OXYCODONE HYDROCHLORIDE PRN MG: 5 TABLET ORAL at 13:05

## 2018-09-04 RX ADMIN — ALLOPURINOL SCH MG: 100 TABLET ORAL at 10:10

## 2018-09-04 RX ADMIN — SODIUM CHLORIDE, PRESERVATIVE FREE SCH ML: 5 INJECTION INTRAVENOUS at 20:59

## 2018-09-04 RX ADMIN — CHOLECALCIFEROL TAB 125 MCG (5000 UNIT) SCH UNIT: 125 TAB at 10:10

## 2018-09-04 RX ADMIN — ONDANSETRON PRN MG: 4 TABLET, ORALLY DISINTEGRATING ORAL at 08:15

## 2018-09-04 RX ADMIN — ONDANSETRON PRN MG: 4 TABLET, ORALLY DISINTEGRATING ORAL at 12:03

## 2018-09-04 RX ADMIN — DRONABINOL SCH MG: 5 CAPSULE ORAL at 12:07

## 2018-09-04 RX ADMIN — OXYCODONE HYDROCHLORIDE PRN MG: 5 TABLET ORAL at 05:56

## 2018-09-04 RX ADMIN — PANTOPRAZOLE SODIUM SCH MG: 40 TABLET, DELAYED RELEASE ORAL at 10:10

## 2018-09-04 RX ADMIN — HEPARIN SODIUM SCH UNITS: 5000 INJECTION, SOLUTION INTRAVENOUS; SUBCUTANEOUS at 10:15

## 2018-09-04 RX ADMIN — SODIUM CHLORIDE, PRESERVATIVE FREE SCH ML: 5 INJECTION INTRAVENOUS at 10:23

## 2018-09-04 RX ADMIN — DRONABINOL SCH MG: 5 CAPSULE ORAL at 16:00

## 2018-09-04 RX ADMIN — ONDANSETRON PRN MG: 4 TABLET, ORALLY DISINTEGRATING ORAL at 17:05

## 2018-09-04 RX ADMIN — LACTULOSE SCH GM: 10 SOLUTION ORAL at 10:09

## 2018-09-04 RX ADMIN — PANTOPRAZOLE SODIUM SCH MG: 40 TABLET, DELAYED RELEASE ORAL at 20:59

## 2018-09-04 RX ADMIN — HEPARIN SODIUM SCH UNITS: 5000 INJECTION, SOLUTION INTRAVENOUS; SUBCUTANEOUS at 20:59

## 2018-09-04 RX ADMIN — LACTULOSE SCH GM: 10 SOLUTION ORAL at 20:59

## 2018-09-04 RX ADMIN — ONDANSETRON PRN MG: 4 TABLET, ORALLY DISINTEGRATING ORAL at 20:59

## 2018-09-04 RX ADMIN — MAGNESIUM OXIDE SCH MG: 400 TABLET ORAL at 10:10

## 2018-09-05 VITALS — SYSTOLIC BLOOD PRESSURE: 134 MMHG | DIASTOLIC BLOOD PRESSURE: 80 MMHG

## 2018-09-05 VITALS — DIASTOLIC BLOOD PRESSURE: 83 MMHG | SYSTOLIC BLOOD PRESSURE: 155 MMHG

## 2018-09-05 VITALS — SYSTOLIC BLOOD PRESSURE: 136 MMHG | DIASTOLIC BLOOD PRESSURE: 82 MMHG

## 2018-09-05 VITALS — SYSTOLIC BLOOD PRESSURE: 138 MMHG | DIASTOLIC BLOOD PRESSURE: 70 MMHG

## 2018-09-05 LAB
<PLATELET ESTIMATE>: ADEQUATE
<PLT MORPHOLOGY>: (no result)
ALBUMIN SERPL-MCNC: 2.3 G/DL (ref 3.4–5)
ALP SERPL-CCNC: 71 U/L (ref 45–117)
ALT SERPL-CCNC: 16 U/L (ref 12–78)
ANION GAP SERPL CALC-SCNC: 7 MMOL/L (ref 5–15)
ANISOCYTOSIS BLD QL SMEAR: (no result)
BILIRUB SERPL-MCNC: 0.3 MG/DL (ref 0.2–1)
CALCIUM SERPL-MCNC: 9.1 MG/DL (ref 8.5–10.1)
CHLORIDE SERPL-SCNC: 106 MMOL/L (ref 98–107)
CREAT SERPL-MCNC: 0.98 MG/DL (ref 0.55–1.02)
EOS#(MANUAL): 0.07 X10^3/UL (ref 0–0.4)
EOS% (MANUAL): 4 % (ref 1–7)
ERYTHROCYTE [DISTWIDTH] IN BLOOD BY AUTOMATED COUNT: 15.5 % (ref 9.6–15.2)
LYMPH#(MANUAL): 0.51 X10^3/UL (ref 1–3.4)
LYMPHS% (MANUAL): 30 % (ref 22–44)
MCH RBC QN AUTO: 33.6 PG (ref 27–34.8)
MCHC RBC AUTO-ENTMCNC: 34.9 G/DL (ref 32.4–35.8)
MCV RBC AUTO: 96.4 FL (ref 80–100)
MD: YES
MONOS#(MANUAL): 0.03 X10^3/UL (ref 0.3–2.7)
MONOS% (MANUAL): 2 % (ref 2–9)
OVALOCYTES BLD QL SMEAR: (no result)
PLATELET # BLD AUTO: 193 X10^3/UL (ref 130–400)
PMV BLD AUTO: 7.5 FL (ref 7.4–10.4)
POLYCHROMASIA BLD QL SMEAR: (no result)
PROT SERPL-MCNC: 5.3 G/DL (ref 6.4–8.2)
RBC # BLD AUTO: 2.94 X10^6/UL (ref 3.82–5.3)
SEG#(MANUAL): 1.09 X10^3/UL (ref 1.8–6.8)
SEGS% (MANUAL): 64 % (ref 42–75)

## 2018-09-05 RX ADMIN — HEPARIN SODIUM SCH UNITS: 5000 INJECTION, SOLUTION INTRAVENOUS; SUBCUTANEOUS at 10:02

## 2018-09-05 RX ADMIN — ONDANSETRON PRN MG: 4 TABLET, ORALLY DISINTEGRATING ORAL at 06:06

## 2018-09-05 RX ADMIN — MAGNESIUM OXIDE SCH MG: 400 TABLET ORAL at 10:01

## 2018-09-05 RX ADMIN — ONDANSETRON PRN MG: 4 TABLET, ORALLY DISINTEGRATING ORAL at 15:05

## 2018-09-05 RX ADMIN — ONDANSETRON PRN MG: 4 TABLET, ORALLY DISINTEGRATING ORAL at 01:38

## 2018-09-05 RX ADMIN — ONDANSETRON PRN MG: 4 TABLET, ORALLY DISINTEGRATING ORAL at 10:01

## 2018-09-05 RX ADMIN — OXYCODONE HYDROCHLORIDE PRN MG: 5 TABLET ORAL at 10:01

## 2018-09-05 RX ADMIN — CHOLECALCIFEROL TAB 125 MCG (5000 UNIT) SCH UNIT: 125 TAB at 10:01

## 2018-09-05 RX ADMIN — LACTULOSE SCH GM: 10 SOLUTION ORAL at 10:01

## 2018-09-05 RX ADMIN — OXYCODONE HYDROCHLORIDE PRN MG: 5 TABLET ORAL at 15:05

## 2018-09-05 RX ADMIN — B-COMPLEX W/ C & FOLIC ACID TAB SCH TAB: TAB at 10:01

## 2018-09-05 RX ADMIN — PANTOPRAZOLE SODIUM SCH MG: 40 TABLET, DELAYED RELEASE ORAL at 10:01

## 2018-09-05 RX ADMIN — LACTULOSE SCH GM: 10 SOLUTION ORAL at 22:24

## 2018-09-05 RX ADMIN — ALLOPURINOL SCH MG: 100 TABLET ORAL at 10:01

## 2018-09-05 RX ADMIN — OXYCODONE HYDROCHLORIDE PRN MG: 5 TABLET ORAL at 01:39

## 2018-09-05 RX ADMIN — HEPARIN SODIUM SCH UNITS: 5000 INJECTION, SOLUTION INTRAVENOUS; SUBCUTANEOUS at 22:22

## 2018-09-05 RX ADMIN — OXYCODONE HYDROCHLORIDE PRN MG: 5 TABLET ORAL at 06:06

## 2018-09-05 RX ADMIN — PANTOPRAZOLE SODIUM SCH MG: 40 TABLET, DELAYED RELEASE ORAL at 22:22

## 2018-09-05 RX ADMIN — SODIUM CHLORIDE, PRESERVATIVE FREE SCH ML: 5 INJECTION INTRAVENOUS at 10:18

## 2018-09-05 RX ADMIN — SODIUM CHLORIDE, PRESERVATIVE FREE SCH ML: 5 INJECTION INTRAVENOUS at 22:21

## 2018-09-06 VITALS — DIASTOLIC BLOOD PRESSURE: 87 MMHG | SYSTOLIC BLOOD PRESSURE: 158 MMHG

## 2018-09-06 VITALS — DIASTOLIC BLOOD PRESSURE: 75 MMHG | SYSTOLIC BLOOD PRESSURE: 133 MMHG

## 2018-09-06 VITALS — SYSTOLIC BLOOD PRESSURE: 151 MMHG | DIASTOLIC BLOOD PRESSURE: 73 MMHG

## 2018-09-06 VITALS — SYSTOLIC BLOOD PRESSURE: 146 MMHG | DIASTOLIC BLOOD PRESSURE: 80 MMHG

## 2018-09-06 LAB
<PLATELET ESTIMATE>: ADEQUATE
<PLT MORPHOLOGY>: (no result)
ALBUMIN SERPL-MCNC: 2.3 G/DL (ref 3.4–5)
ALP SERPL-CCNC: 85 U/L (ref 45–117)
ALT SERPL-CCNC: 22 U/L (ref 12–78)
ANION GAP SERPL CALC-SCNC: 7 MMOL/L (ref 5–15)
ANISOCYTOSIS BLD QL SMEAR: (no result)
BAND#(MANUAL): 0.02 X10^3/UL
BASOPHILS NFR BLD MANUAL: 1 % (ref 0–1)
BASOS#(MANUAL): 0.02 X10^3/UL (ref 0–0.1)
BILIRUB SERPL-MCNC: 0.4 MG/DL (ref 0.2–1)
CALCIUM SERPL-MCNC: 8.5 MG/DL (ref 8.5–10.1)
CHLORIDE SERPL-SCNC: 105 MMOL/L (ref 98–107)
CREAT SERPL-MCNC: 1.01 MG/DL (ref 0.55–1.02)
EOS#(MANUAL): 0.02 X10^3/UL (ref 0–0.4)
EOS% (MANUAL): 1 % (ref 1–7)
ERYTHROCYTE [DISTWIDTH] IN BLOOD BY AUTOMATED COUNT: 15.3 % (ref 9.6–15.2)
LYMPH#(MANUAL): 0.48 X10^3/UL (ref 1–3.4)
LYMPHS% (MANUAL): 25 % (ref 22–44)
MCH RBC QN AUTO: 32.1 PG (ref 27–34.8)
MCHC RBC AUTO-ENTMCNC: 33.8 G/DL (ref 32.4–35.8)
MCV RBC AUTO: 95 FL (ref 80–100)
MD: YES
MONOS#(MANUAL): 0.08 X10^3/UL (ref 0.3–2.7)
MONOS% (MANUAL): 4 % (ref 2–9)
NEUTS BAND NFR BLD: 1 % (ref 0–7)
OVALOCYTES BLD QL SMEAR: (no result)
PLATELET # BLD AUTO: 181 X10^3/UL (ref 130–400)
PMV BLD AUTO: 7.6 FL (ref 7.4–10.4)
POLYCHROMASIA BLD QL SMEAR: (no result)
PROT SERPL-MCNC: 5.3 G/DL (ref 6.4–8.2)
RBC # BLD AUTO: 2.97 X10^6/UL (ref 3.82–5.3)
SEG#(MANUAL): 1.29 X10^3/UL (ref 1.8–6.8)
SEGS% (MANUAL): 68 % (ref 42–75)

## 2018-09-06 RX ADMIN — MAGNESIUM OXIDE SCH MG: 400 TABLET ORAL at 08:19

## 2018-09-06 RX ADMIN — HEPARIN SODIUM SCH UNITS: 5000 INJECTION, SOLUTION INTRAVENOUS; SUBCUTANEOUS at 21:14

## 2018-09-06 RX ADMIN — HEPARIN SODIUM SCH UNITS: 5000 INJECTION, SOLUTION INTRAVENOUS; SUBCUTANEOUS at 08:19

## 2018-09-06 RX ADMIN — LACTULOSE SCH GM: 10 SOLUTION ORAL at 08:19

## 2018-09-06 RX ADMIN — ONDANSETRON PRN MG: 4 TABLET, ORALLY DISINTEGRATING ORAL at 17:46

## 2018-09-06 RX ADMIN — B-COMPLEX W/ C & FOLIC ACID TAB SCH TAB: TAB at 08:19

## 2018-09-06 RX ADMIN — POLYETHYLENE GLYCOL 3350 PRN GM: 17 POWDER, FOR SOLUTION ORAL at 08:19

## 2018-09-06 RX ADMIN — OXYCODONE HYDROCHLORIDE PRN MG: 5 TABLET ORAL at 08:19

## 2018-09-06 RX ADMIN — OXYCODONE HYDROCHLORIDE PRN MG: 5 TABLET ORAL at 17:46

## 2018-09-06 RX ADMIN — ALLOPURINOL SCH MG: 100 TABLET ORAL at 08:19

## 2018-09-06 RX ADMIN — SODIUM CHLORIDE, PRESERVATIVE FREE SCH ML: 5 INJECTION INTRAVENOUS at 08:20

## 2018-09-06 RX ADMIN — PANTOPRAZOLE SODIUM SCH MG: 40 TABLET, DELAYED RELEASE ORAL at 21:10

## 2018-09-06 RX ADMIN — LACTULOSE SCH GM: 10 SOLUTION ORAL at 21:09

## 2018-09-06 RX ADMIN — PANTOPRAZOLE SODIUM SCH MG: 40 TABLET, DELAYED RELEASE ORAL at 08:19

## 2018-09-06 RX ADMIN — SODIUM CHLORIDE, PRESERVATIVE FREE SCH ML: 5 INJECTION INTRAVENOUS at 21:00

## 2018-09-06 RX ADMIN — ONDANSETRON PRN MG: 4 TABLET, ORALLY DISINTEGRATING ORAL at 03:45

## 2018-09-06 RX ADMIN — ONDANSETRON PRN MG: 4 TABLET, ORALLY DISINTEGRATING ORAL at 08:19

## 2018-09-06 RX ADMIN — OXYCODONE HYDROCHLORIDE PRN MG: 5 TABLET ORAL at 03:45

## 2018-09-06 RX ADMIN — CHOLECALCIFEROL TAB 125 MCG (5000 UNIT) SCH UNIT: 125 TAB at 08:19

## 2018-09-07 VITALS — DIASTOLIC BLOOD PRESSURE: 83 MMHG | SYSTOLIC BLOOD PRESSURE: 115 MMHG

## 2018-09-07 VITALS — DIASTOLIC BLOOD PRESSURE: 76 MMHG | SYSTOLIC BLOOD PRESSURE: 132 MMHG

## 2018-09-07 VITALS — DIASTOLIC BLOOD PRESSURE: 80 MMHG | SYSTOLIC BLOOD PRESSURE: 160 MMHG

## 2018-09-07 VITALS — DIASTOLIC BLOOD PRESSURE: 86 MMHG | SYSTOLIC BLOOD PRESSURE: 150 MMHG

## 2018-09-07 LAB
ALBUMIN SERPL-MCNC: 2.3 G/DL (ref 3.4–5)
ALP SERPL-CCNC: 95 U/L (ref 45–117)
ALT SERPL-CCNC: 29 U/L (ref 12–78)
ANION GAP SERPL CALC-SCNC: 7 MMOL/L (ref 5–15)
BASOPHILS # BLD AUTO: 0.01 X10^3/UL (ref 0–0.1)
BASOPHILS NFR BLD AUTO: 0 % (ref 0–1)
BILIRUB SERPL-MCNC: 0.4 MG/DL (ref 0.2–1)
CALCIUM SERPL-MCNC: 8.7 MG/DL (ref 8.5–10.1)
CHLORIDE SERPL-SCNC: 104 MMOL/L (ref 98–107)
CREAT SERPL-MCNC: 0.9 MG/DL (ref 0.55–1.02)
EOSINOPHIL # BLD AUTO: 0.06 X10^3/UL (ref 0–0.4)
EOSINOPHIL NFR BLD AUTO: 3 % (ref 1–7)
ERYTHROCYTE [DISTWIDTH] IN BLOOD BY AUTOMATED COUNT: 15 % (ref 9.6–15.2)
LYMPHOCYTES # BLD AUTO: 0.37 X10^3/UL (ref 1–3.4)
LYMPHOCYTES NFR BLD AUTO: 18 % (ref 22–44)
MCH RBC QN AUTO: 33.6 PG (ref 27–34.8)
MCHC RBC AUTO-ENTMCNC: 35.1 G/DL (ref 32.4–35.8)
MCV RBC AUTO: 95.6 FL (ref 80–100)
MD: (no result)
MONOCYTES # BLD AUTO: 0.15 X10^3/UL (ref 0.2–0.8)
MONOCYTES NFR BLD AUTO: 7 % (ref 2–9)
NEUTROPHILS # BLD AUTO: 1.52 X10^3/UL (ref 1.8–6.8)
NEUTROPHILS NFR BLD AUTO: 72 % (ref 42–75)
PLATELET # BLD AUTO: 184 X10^3/UL (ref 130–400)
PMV BLD AUTO: 7.4 FL (ref 7.4–10.4)
PROT SERPL-MCNC: 5.5 G/DL (ref 6.4–8.2)
RBC # BLD AUTO: 3.14 X10^6/UL (ref 3.82–5.3)

## 2018-09-07 RX ADMIN — SODIUM CHLORIDE, PRESERVATIVE FREE SCH ML: 5 INJECTION INTRAVENOUS at 22:08

## 2018-09-07 RX ADMIN — ONDANSETRON PRN MG: 4 TABLET, ORALLY DISINTEGRATING ORAL at 17:27

## 2018-09-07 RX ADMIN — SODIUM CHLORIDE, PRESERVATIVE FREE SCH ML: 5 INJECTION INTRAVENOUS at 08:29

## 2018-09-07 RX ADMIN — OXYCODONE HYDROCHLORIDE PRN MG: 5 TABLET ORAL at 04:36

## 2018-09-07 RX ADMIN — OXYCODONE HYDROCHLORIDE PRN MG: 5 TABLET ORAL at 17:27

## 2018-09-07 RX ADMIN — ONDANSETRON PRN MG: 4 TABLET, ORALLY DISINTEGRATING ORAL at 12:19

## 2018-09-07 RX ADMIN — LACTULOSE SCH GM: 10 SOLUTION ORAL at 21:59

## 2018-09-07 RX ADMIN — ONDANSETRON PRN MG: 4 TABLET, ORALLY DISINTEGRATING ORAL at 21:59

## 2018-09-07 RX ADMIN — PANTOPRAZOLE SODIUM SCH MG: 40 TABLET, DELAYED RELEASE ORAL at 21:59

## 2018-09-07 RX ADMIN — B-COMPLEX W/ C & FOLIC ACID TAB SCH TAB: TAB at 08:29

## 2018-09-07 RX ADMIN — MAGNESIUM OXIDE SCH MG: 400 TABLET ORAL at 08:29

## 2018-09-07 RX ADMIN — PANTOPRAZOLE SODIUM SCH MG: 40 TABLET, DELAYED RELEASE ORAL at 08:29

## 2018-09-07 RX ADMIN — ONDANSETRON PRN MG: 4 TABLET, ORALLY DISINTEGRATING ORAL at 04:38

## 2018-09-07 RX ADMIN — ALLOPURINOL SCH MG: 100 TABLET ORAL at 08:29

## 2018-09-07 RX ADMIN — CHOLECALCIFEROL TAB 125 MCG (5000 UNIT) SCH UNIT: 125 TAB at 08:29

## 2018-09-07 RX ADMIN — OXYCODONE HYDROCHLORIDE PRN MG: 5 TABLET ORAL at 23:08

## 2018-09-07 RX ADMIN — OXYCODONE HYDROCHLORIDE PRN MG: 5 TABLET ORAL at 12:19

## 2018-09-07 RX ADMIN — HEPARIN SODIUM SCH UNITS: 5000 INJECTION, SOLUTION INTRAVENOUS; SUBCUTANEOUS at 22:05

## 2018-09-07 RX ADMIN — PROMETHAZINE HYDROCHLORIDE PRN MG: 25 INJECTION INTRAMUSCULAR; INTRAVENOUS at 19:53

## 2018-09-07 RX ADMIN — HEPARIN SODIUM SCH UNITS: 5000 INJECTION, SOLUTION INTRAVENOUS; SUBCUTANEOUS at 08:29

## 2018-09-07 RX ADMIN — LACTULOSE SCH GM: 10 SOLUTION ORAL at 08:29

## 2018-09-08 VITALS — DIASTOLIC BLOOD PRESSURE: 76 MMHG | SYSTOLIC BLOOD PRESSURE: 123 MMHG

## 2018-09-08 VITALS — DIASTOLIC BLOOD PRESSURE: 84 MMHG | SYSTOLIC BLOOD PRESSURE: 140 MMHG

## 2018-09-08 VITALS — DIASTOLIC BLOOD PRESSURE: 81 MMHG | SYSTOLIC BLOOD PRESSURE: 146 MMHG

## 2018-09-08 VITALS — DIASTOLIC BLOOD PRESSURE: 83 MMHG | SYSTOLIC BLOOD PRESSURE: 131 MMHG

## 2018-09-08 LAB
ALBUMIN SERPL-MCNC: 2.6 G/DL (ref 3.4–5)
ALP SERPL-CCNC: 107 U/L (ref 45–117)
ALT SERPL-CCNC: 27 U/L (ref 12–78)
ANION GAP SERPL CALC-SCNC: 9 MMOL/L (ref 5–15)
BASOPHILS # BLD AUTO: 0.02 X10^3/UL (ref 0–0.1)
BASOPHILS NFR BLD AUTO: 1 % (ref 0–1)
BILIRUB SERPL-MCNC: 0.5 MG/DL (ref 0.2–1)
CALCIUM SERPL-MCNC: 9 MG/DL (ref 8.5–10.1)
CHLORIDE SERPL-SCNC: 103 MMOL/L (ref 98–107)
CREAT SERPL-MCNC: 0.98 MG/DL (ref 0.55–1.02)
EOSINOPHIL # BLD AUTO: 0.06 X10^3/UL (ref 0–0.4)
EOSINOPHIL NFR BLD AUTO: 3 % (ref 1–7)
ERYTHROCYTE [DISTWIDTH] IN BLOOD BY AUTOMATED COUNT: 15 % (ref 9.6–15.2)
LYMPHOCYTES # BLD AUTO: 0.27 X10^3/UL (ref 1–3.4)
LYMPHOCYTES NFR BLD AUTO: 12 % (ref 22–44)
MCH RBC QN AUTO: 32.9 PG (ref 27–34.8)
MCHC RBC AUTO-ENTMCNC: 34.5 G/DL (ref 32.4–35.8)
MCV RBC AUTO: 95.5 FL (ref 80–100)
MD: (no result)
MONOCYTES # BLD AUTO: 0.13 X10^3/UL (ref 0.2–0.8)
MONOCYTES NFR BLD AUTO: 6 % (ref 2–9)
NEUTROPHILS # BLD AUTO: 1.79 X10^3/UL (ref 1.8–6.8)
NEUTROPHILS NFR BLD AUTO: 79 % (ref 42–75)
PLATELET # BLD AUTO: 210 X10^3/UL (ref 130–400)
PMV BLD AUTO: 7.5 FL (ref 7.4–10.4)
PROT SERPL-MCNC: 6 G/DL (ref 6.4–8.2)
RBC # BLD AUTO: 3.46 X10^6/UL (ref 3.82–5.3)

## 2018-09-08 RX ADMIN — LACTULOSE SCH GM: 10 SOLUTION ORAL at 22:00

## 2018-09-08 RX ADMIN — OXYCODONE HYDROCHLORIDE PRN MG: 5 TABLET ORAL at 09:14

## 2018-09-08 RX ADMIN — OXYCODONE HYDROCHLORIDE PRN MG: 5 TABLET ORAL at 03:59

## 2018-09-08 RX ADMIN — HEPARIN SODIUM SCH UNITS: 5000 INJECTION, SOLUTION INTRAVENOUS; SUBCUTANEOUS at 22:00

## 2018-09-08 RX ADMIN — ONDANSETRON PRN MG: 4 TABLET, ORALLY DISINTEGRATING ORAL at 13:26

## 2018-09-08 RX ADMIN — ONDANSETRON PRN MG: 4 TABLET, ORALLY DISINTEGRATING ORAL at 09:14

## 2018-09-08 RX ADMIN — OXYCODONE HYDROCHLORIDE PRN MG: 5 TABLET ORAL at 23:13

## 2018-09-08 RX ADMIN — SODIUM CHLORIDE, PRESERVATIVE FREE SCH ML: 5 INJECTION INTRAVENOUS at 09:00

## 2018-09-08 RX ADMIN — OXYCODONE HYDROCHLORIDE PRN MG: 5 TABLET ORAL at 13:26

## 2018-09-08 RX ADMIN — PANTOPRAZOLE SODIUM SCH MG: 40 TABLET, DELAYED RELEASE ORAL at 22:00

## 2018-09-08 RX ADMIN — MAGNESIUM OXIDE SCH MG: 400 TABLET ORAL at 09:14

## 2018-09-08 RX ADMIN — B-COMPLEX W/ C & FOLIC ACID TAB SCH TAB: TAB at 09:16

## 2018-09-08 RX ADMIN — PANTOPRAZOLE SODIUM SCH MG: 40 TABLET, DELAYED RELEASE ORAL at 09:14

## 2018-09-08 RX ADMIN — ONDANSETRON PRN MG: 4 TABLET, ORALLY DISINTEGRATING ORAL at 03:59

## 2018-09-08 RX ADMIN — HEPARIN SODIUM SCH UNITS: 5000 INJECTION, SOLUTION INTRAVENOUS; SUBCUTANEOUS at 09:15

## 2018-09-08 RX ADMIN — LACTULOSE SCH GM: 10 SOLUTION ORAL at 09:14

## 2018-09-08 RX ADMIN — ONDANSETRON PRN MG: 4 TABLET, ORALLY DISINTEGRATING ORAL at 23:13

## 2018-09-08 RX ADMIN — CHOLECALCIFEROL TAB 125 MCG (5000 UNIT) SCH UNIT: 125 TAB at 09:15

## 2018-09-08 RX ADMIN — SODIUM CHLORIDE, PRESERVATIVE FREE SCH ML: 5 INJECTION INTRAVENOUS at 22:03

## 2018-09-08 RX ADMIN — OXYCODONE HYDROCHLORIDE PRN MG: 5 TABLET ORAL at 18:00

## 2018-09-08 RX ADMIN — ALLOPURINOL SCH MG: 100 TABLET ORAL at 09:14

## 2018-09-08 RX ADMIN — OXYCODONE HYDROCHLORIDE PRN MG: 5 TABLET ORAL at 13:29

## 2018-09-09 VITALS — DIASTOLIC BLOOD PRESSURE: 73 MMHG | SYSTOLIC BLOOD PRESSURE: 140 MMHG

## 2018-09-09 VITALS — DIASTOLIC BLOOD PRESSURE: 85 MMHG | SYSTOLIC BLOOD PRESSURE: 139 MMHG

## 2018-09-09 VITALS — DIASTOLIC BLOOD PRESSURE: 84 MMHG | SYSTOLIC BLOOD PRESSURE: 139 MMHG

## 2018-09-09 VITALS — DIASTOLIC BLOOD PRESSURE: 82 MMHG | SYSTOLIC BLOOD PRESSURE: 118 MMHG

## 2018-09-09 RX ADMIN — SODIUM CHLORIDE, PRESERVATIVE FREE SCH ML: 5 INJECTION INTRAVENOUS at 20:59

## 2018-09-09 RX ADMIN — ONDANSETRON PRN MG: 4 TABLET, ORALLY DISINTEGRATING ORAL at 14:23

## 2018-09-09 RX ADMIN — PROMETHAZINE HYDROCHLORIDE PRN MG: 25 INJECTION INTRAMUSCULAR; INTRAVENOUS at 12:00

## 2018-09-09 RX ADMIN — OXYCODONE HYDROCHLORIDE PRN MG: 5 TABLET ORAL at 14:24

## 2018-09-09 RX ADMIN — ONDANSETRON PRN MG: 4 TABLET, ORALLY DISINTEGRATING ORAL at 21:51

## 2018-09-09 RX ADMIN — OXYCODONE HYDROCHLORIDE PRN MG: 5 TABLET ORAL at 21:51

## 2018-09-09 RX ADMIN — HYDROMORPHONE HYDROCHLORIDE PRN MG: 2 INJECTION INTRAMUSCULAR; INTRAVENOUS; SUBCUTANEOUS at 23:53

## 2018-09-09 RX ADMIN — LACTULOSE SCH GM: 10 SOLUTION ORAL at 09:07

## 2018-09-09 RX ADMIN — ONDANSETRON PRN MG: 4 TABLET, ORALLY DISINTEGRATING ORAL at 09:06

## 2018-09-09 RX ADMIN — PANTOPRAZOLE SODIUM SCH MG: 40 TABLET, DELAYED RELEASE ORAL at 20:58

## 2018-09-09 RX ADMIN — OXYCODONE HYDROCHLORIDE PRN MG: 5 TABLET ORAL at 04:35

## 2018-09-09 RX ADMIN — OXYCODONE HYDROCHLORIDE PRN MG: 5 TABLET ORAL at 09:06

## 2018-09-09 RX ADMIN — ALLOPURINOL SCH MG: 100 TABLET ORAL at 09:06

## 2018-09-09 RX ADMIN — ONDANSETRON PRN MG: 4 TABLET, ORALLY DISINTEGRATING ORAL at 04:35

## 2018-09-09 RX ADMIN — HEPARIN SODIUM SCH UNITS: 5000 INJECTION, SOLUTION INTRAVENOUS; SUBCUTANEOUS at 09:07

## 2018-09-09 RX ADMIN — MAGNESIUM OXIDE SCH MG: 400 TABLET ORAL at 09:07

## 2018-09-09 RX ADMIN — OXYCODONE HYDROCHLORIDE PRN MG: 5 TABLET ORAL at 16:22

## 2018-09-09 RX ADMIN — LACTULOSE SCH GM: 10 SOLUTION ORAL at 20:59

## 2018-09-09 RX ADMIN — SODIUM CHLORIDE, PRESERVATIVE FREE SCH ML: 5 INJECTION INTRAVENOUS at 09:06

## 2018-09-09 RX ADMIN — CHOLECALCIFEROL TAB 125 MCG (5000 UNIT) SCH UNIT: 125 TAB at 09:06

## 2018-09-09 RX ADMIN — PANTOPRAZOLE SODIUM SCH MG: 40 TABLET, DELAYED RELEASE ORAL at 09:07

## 2018-09-09 RX ADMIN — B-COMPLEX W/ C & FOLIC ACID TAB SCH TAB: TAB at 09:06

## 2018-09-09 RX ADMIN — HEPARIN SODIUM SCH UNITS: 5000 INJECTION, SOLUTION INTRAVENOUS; SUBCUTANEOUS at 20:58

## 2018-09-10 VITALS — DIASTOLIC BLOOD PRESSURE: 90 MMHG | SYSTOLIC BLOOD PRESSURE: 138 MMHG

## 2018-09-10 VITALS — DIASTOLIC BLOOD PRESSURE: 70 MMHG | SYSTOLIC BLOOD PRESSURE: 142 MMHG

## 2018-09-10 VITALS — DIASTOLIC BLOOD PRESSURE: 75 MMHG | SYSTOLIC BLOOD PRESSURE: 130 MMHG

## 2018-09-10 VITALS — DIASTOLIC BLOOD PRESSURE: 84 MMHG | SYSTOLIC BLOOD PRESSURE: 142 MMHG

## 2018-09-10 LAB
ALBUMIN SERPL-MCNC: 2.7 G/DL (ref 3.4–5)
ALP SERPL-CCNC: 114 U/L (ref 45–117)
ALT SERPL-CCNC: 22 U/L (ref 12–78)
ANION GAP SERPL CALC-SCNC: 7 MMOL/L (ref 5–15)
BASOPHILS # BLD AUTO: 0.01 X10^3/UL (ref 0–0.1)
BASOPHILS NFR BLD AUTO: 0 % (ref 0–1)
BILIRUB SERPL-MCNC: 0.6 MG/DL (ref 0.2–1)
CALCIUM SERPL-MCNC: 9 MG/DL (ref 8.5–10.1)
CHLORIDE SERPL-SCNC: 97 MMOL/L (ref 98–107)
CREAT SERPL-MCNC: 1.1 MG/DL (ref 0.55–1.02)
EOSINOPHIL # BLD AUTO: 0.05 X10^3/UL (ref 0–0.4)
EOSINOPHIL NFR BLD AUTO: 1 % (ref 1–7)
ERYTHROCYTE [DISTWIDTH] IN BLOOD BY AUTOMATED COUNT: 15.4 % (ref 9.6–15.2)
LYMPHOCYTES # BLD AUTO: 0.37 X10^3/UL (ref 1–3.4)
LYMPHOCYTES NFR BLD AUTO: 10 % (ref 22–44)
MCH RBC QN AUTO: 33.5 PG (ref 27–34.8)
MCHC RBC AUTO-ENTMCNC: 34.6 G/DL (ref 32.4–35.8)
MCV RBC AUTO: 96.8 FL (ref 80–100)
MD: NO
MONOCYTES # BLD AUTO: 0.17 X10^3/UL (ref 0.2–0.8)
MONOCYTES NFR BLD AUTO: 5 % (ref 2–9)
NEUTROPHILS # BLD AUTO: 3.06 X10^3/UL (ref 1.8–6.8)
NEUTROPHILS NFR BLD AUTO: 84 % (ref 42–75)
PLATELET # BLD AUTO: 209 X10^3/UL (ref 130–400)
PMV BLD AUTO: 7.7 FL (ref 7.4–10.4)
PROT SERPL-MCNC: 6.2 G/DL (ref 6.4–8.2)
RBC # BLD AUTO: 3.83 X10^6/UL (ref 3.82–5.3)

## 2018-09-10 PROCEDURE — 02HV33Z INSERTION OF INFUSION DEVICE INTO SUPERIOR VENA CAVA, PERCUTANEOUS APPROACH: ICD-10-PCS | Performed by: RADIOLOGY

## 2018-09-10 PROCEDURE — B548ZZA ULTRASONOGRAPHY OF SUPERIOR VENA CAVA, GUIDANCE: ICD-10-PCS | Performed by: RADIOLOGY

## 2018-09-10 PROCEDURE — B5181ZA FLUOROSCOPY OF SUPERIOR VENA CAVA USING LOW OSMOLAR CONTRAST, GUIDANCE: ICD-10-PCS | Performed by: RADIOLOGY

## 2018-09-10 RX ADMIN — ONDANSETRON PRN MG: 4 TABLET, ORALLY DISINTEGRATING ORAL at 04:05

## 2018-09-10 RX ADMIN — SODIUM CHLORIDE, PRESERVATIVE FREE SCH ML: 5 INJECTION INTRAVENOUS at 07:41

## 2018-09-10 RX ADMIN — SODIUM CHLORIDE, PRESERVATIVE FREE SCH ML: 5 INJECTION INTRAVENOUS at 20:21

## 2018-09-10 RX ADMIN — OXYCODONE HYDROCHLORIDE PRN MG: 5 TABLET ORAL at 12:17

## 2018-09-10 RX ADMIN — CHOLECALCIFEROL TAB 125 MCG (5000 UNIT) SCH UNIT: 125 TAB at 07:40

## 2018-09-10 RX ADMIN — LACTULOSE SCH GM: 10 SOLUTION ORAL at 20:20

## 2018-09-10 RX ADMIN — ONDANSETRON PRN MG: 4 TABLET, ORALLY DISINTEGRATING ORAL at 21:05

## 2018-09-10 RX ADMIN — HEPARIN SODIUM SCH UNITS: 5000 INJECTION, SOLUTION INTRAVENOUS; SUBCUTANEOUS at 20:20

## 2018-09-10 RX ADMIN — B-COMPLEX W/ C & FOLIC ACID TAB SCH TAB: TAB at 07:40

## 2018-09-10 RX ADMIN — OXYCODONE HYDROCHLORIDE PRN MG: 5 TABLET ORAL at 21:05

## 2018-09-10 RX ADMIN — ONDANSETRON PRN MG: 4 TABLET, ORALLY DISINTEGRATING ORAL at 16:30

## 2018-09-10 RX ADMIN — MAGNESIUM OXIDE SCH MG: 400 TABLET ORAL at 07:41

## 2018-09-10 RX ADMIN — LACTULOSE SCH GM: 10 SOLUTION ORAL at 07:41

## 2018-09-10 RX ADMIN — ONDANSETRON PRN MG: 4 TABLET, ORALLY DISINTEGRATING ORAL at 12:17

## 2018-09-10 RX ADMIN — HEPARIN SODIUM SCH UNITS: 5000 INJECTION, SOLUTION INTRAVENOUS; SUBCUTANEOUS at 07:24

## 2018-09-10 RX ADMIN — OXYCODONE HYDROCHLORIDE PRN MG: 5 TABLET ORAL at 16:30

## 2018-09-10 RX ADMIN — ALLOPURINOL SCH MG: 100 TABLET ORAL at 07:41

## 2018-09-10 RX ADMIN — PANTOPRAZOLE SODIUM SCH MG: 40 TABLET, DELAYED RELEASE ORAL at 20:20

## 2018-09-10 RX ADMIN — ONDANSETRON PRN MG: 4 TABLET, ORALLY DISINTEGRATING ORAL at 08:10

## 2018-09-10 RX ADMIN — PANTOPRAZOLE SODIUM SCH MG: 40 TABLET, DELAYED RELEASE ORAL at 07:41

## 2018-09-10 RX ADMIN — OXYCODONE HYDROCHLORIDE PRN MG: 5 TABLET ORAL at 08:10

## 2018-09-10 RX ADMIN — OXYCODONE HYDROCHLORIDE PRN MG: 5 TABLET ORAL at 04:05

## 2018-09-11 VITALS — DIASTOLIC BLOOD PRESSURE: 85 MMHG | SYSTOLIC BLOOD PRESSURE: 134 MMHG

## 2018-09-11 VITALS — DIASTOLIC BLOOD PRESSURE: 78 MMHG | SYSTOLIC BLOOD PRESSURE: 141 MMHG

## 2018-09-11 VITALS — SYSTOLIC BLOOD PRESSURE: 136 MMHG | DIASTOLIC BLOOD PRESSURE: 80 MMHG

## 2018-09-11 VITALS — SYSTOLIC BLOOD PRESSURE: 143 MMHG | DIASTOLIC BLOOD PRESSURE: 85 MMHG

## 2018-09-11 LAB
ANION GAP SERPL CALC-SCNC: 10 MMOL/L (ref 5–15)
BASOPHILS # BLD AUTO: 0.02 X10^3/UL (ref 0–0.1)
BASOPHILS NFR BLD AUTO: 0 % (ref 0–1)
CALCIUM SERPL-MCNC: 9.2 MG/DL (ref 8.5–10.1)
CHLORIDE SERPL-SCNC: 96 MMOL/L (ref 98–107)
CREAT SERPL-MCNC: 0.97 MG/DL (ref 0.55–1.02)
EOSINOPHIL # BLD AUTO: 0.06 X10^3/UL (ref 0–0.4)
EOSINOPHIL NFR BLD AUTO: 1 % (ref 1–7)
ERYTHROCYTE [DISTWIDTH] IN BLOOD BY AUTOMATED COUNT: 15.3 % (ref 9.6–15.2)
LYMPHOCYTES # BLD AUTO: 0.42 X10^3/UL (ref 1–3.4)
LYMPHOCYTES NFR BLD AUTO: 8 % (ref 22–44)
MCH RBC QN AUTO: 33.4 PG (ref 27–34.8)
MCHC RBC AUTO-ENTMCNC: 34.7 G/DL (ref 32.4–35.8)
MCV RBC AUTO: 96.2 FL (ref 80–100)
MD: NO
MONOCYTES # BLD AUTO: 0.25 X10^3/UL (ref 0.2–0.8)
MONOCYTES NFR BLD AUTO: 5 % (ref 2–9)
NEUTROPHILS # BLD AUTO: 4.55 X10^3/UL (ref 1.8–6.8)
NEUTROPHILS NFR BLD AUTO: 86 % (ref 42–75)
PLATELET # BLD AUTO: 216 X10^3/UL (ref 130–400)
PMV BLD AUTO: 7.6 FL (ref 7.4–10.4)
RBC # BLD AUTO: 3.59 X10^6/UL (ref 3.82–5.3)

## 2018-09-11 RX ADMIN — MAGNESIUM OXIDE SCH MG: 400 TABLET ORAL at 09:43

## 2018-09-11 RX ADMIN — ALLOPURINOL SCH MG: 300 TABLET ORAL at 09:46

## 2018-09-11 RX ADMIN — ONDANSETRON PRN MG: 4 TABLET, ORALLY DISINTEGRATING ORAL at 09:44

## 2018-09-11 RX ADMIN — PROMETHAZINE HYDROCHLORIDE PRN MG: 25 INJECTION INTRAMUSCULAR; INTRAVENOUS at 11:45

## 2018-09-11 RX ADMIN — OXYCODONE HYDROCHLORIDE PRN MG: 5 TABLET ORAL at 09:44

## 2018-09-11 RX ADMIN — LACTULOSE SCH GM: 10 SOLUTION ORAL at 09:42

## 2018-09-11 RX ADMIN — PANTOPRAZOLE SODIUM SCH MG: 40 TABLET, DELAYED RELEASE ORAL at 21:15

## 2018-09-11 RX ADMIN — SODIUM CHLORIDE, PRESERVATIVE FREE SCH ML: 5 INJECTION INTRAVENOUS at 21:16

## 2018-09-11 RX ADMIN — CHOLECALCIFEROL TAB 125 MCG (5000 UNIT) SCH UNIT: 125 TAB at 09:43

## 2018-09-11 RX ADMIN — LACTULOSE SCH GM: 10 SOLUTION ORAL at 21:15

## 2018-09-11 RX ADMIN — HEPARIN SODIUM SCH UNITS: 5000 INJECTION, SOLUTION INTRAVENOUS; SUBCUTANEOUS at 09:44

## 2018-09-11 RX ADMIN — OXYCODONE HYDROCHLORIDE PRN MG: 5 TABLET ORAL at 01:05

## 2018-09-11 RX ADMIN — ONDANSETRON PRN MG: 4 TABLET, ORALLY DISINTEGRATING ORAL at 05:03

## 2018-09-11 RX ADMIN — ONDANSETRON PRN MG: 4 TABLET, ORALLY DISINTEGRATING ORAL at 01:05

## 2018-09-11 RX ADMIN — PANTOPRAZOLE SODIUM SCH MG: 40 TABLET, DELAYED RELEASE ORAL at 09:43

## 2018-09-11 RX ADMIN — OXYCODONE HYDROCHLORIDE PRN MG: 5 TABLET ORAL at 20:12

## 2018-09-11 RX ADMIN — ONDANSETRON PRN MG: 4 TABLET, ORALLY DISINTEGRATING ORAL at 15:28

## 2018-09-11 RX ADMIN — HEPARIN SODIUM SCH UNITS: 5000 INJECTION, SOLUTION INTRAVENOUS; SUBCUTANEOUS at 21:15

## 2018-09-11 RX ADMIN — SODIUM CHLORIDE SCH MLS/HR: 0.9 INJECTION, SOLUTION INTRAVENOUS at 09:42

## 2018-09-11 RX ADMIN — B-COMPLEX W/ C & FOLIC ACID TAB SCH TAB: TAB at 10:01

## 2018-09-11 RX ADMIN — ONDANSETRON PRN MG: 4 TABLET, ORALLY DISINTEGRATING ORAL at 20:12

## 2018-09-11 RX ADMIN — SODIUM CHLORIDE, PRESERVATIVE FREE SCH ML: 5 INJECTION INTRAVENOUS at 09:42

## 2018-09-11 RX ADMIN — OXYCODONE HYDROCHLORIDE PRN MG: 5 TABLET ORAL at 05:03

## 2018-09-11 RX ADMIN — OXYCODONE HYDROCHLORIDE PRN MG: 5 TABLET ORAL at 15:28

## 2018-09-11 RX ADMIN — HYDROMORPHONE HYDROCHLORIDE PRN MG: 2 INJECTION INTRAMUSCULAR; INTRAVENOUS; SUBCUTANEOUS at 13:37

## 2018-09-12 VITALS — SYSTOLIC BLOOD PRESSURE: 142 MMHG | DIASTOLIC BLOOD PRESSURE: 82 MMHG

## 2018-09-12 VITALS — DIASTOLIC BLOOD PRESSURE: 86 MMHG | SYSTOLIC BLOOD PRESSURE: 140 MMHG

## 2018-09-12 VITALS — DIASTOLIC BLOOD PRESSURE: 81 MMHG | SYSTOLIC BLOOD PRESSURE: 130 MMHG

## 2018-09-12 VITALS — DIASTOLIC BLOOD PRESSURE: 85 MMHG | SYSTOLIC BLOOD PRESSURE: 140 MMHG

## 2018-09-12 LAB
ALBUMIN SERPL-MCNC: 2.2 G/DL (ref 3.4–5)
ALP SERPL-CCNC: 100 U/L (ref 45–117)
ALT SERPL-CCNC: 12 U/L (ref 12–78)
ANION GAP SERPL CALC-SCNC: 8 MMOL/L (ref 5–15)
BASOPHILS # BLD AUTO: 0.02 X10^3/UL (ref 0–0.1)
BASOPHILS NFR BLD AUTO: 0 % (ref 0–1)
BILIRUB SERPL-MCNC: 0.4 MG/DL (ref 0.2–1)
CALCIUM SERPL-MCNC: 8.9 MG/DL (ref 8.5–10.1)
CHLORIDE SERPL-SCNC: 94 MMOL/L (ref 98–107)
CREAT SERPL-MCNC: 0.86 MG/DL (ref 0.55–1.02)
EOSINOPHIL # BLD AUTO: 0.11 X10^3/UL (ref 0–0.4)
EOSINOPHIL NFR BLD AUTO: 3 % (ref 1–7)
ERYTHROCYTE [DISTWIDTH] IN BLOOD BY AUTOMATED COUNT: 15.2 % (ref 9.6–15.2)
LYMPHOCYTES # BLD AUTO: 0.35 X10^3/UL (ref 1–3.4)
LYMPHOCYTES NFR BLD AUTO: 9 % (ref 22–44)
MCH RBC QN AUTO: 33.1 PG (ref 27–34.8)
MCHC RBC AUTO-ENTMCNC: 34.6 G/DL (ref 32.4–35.8)
MCV RBC AUTO: 95.6 FL (ref 80–100)
MD: NO
MONOCYTES # BLD AUTO: 0.19 X10^3/UL (ref 0.2–0.8)
MONOCYTES NFR BLD AUTO: 5 % (ref 2–9)
NEUTROPHILS # BLD AUTO: 3.36 X10^3/UL (ref 1.8–6.8)
NEUTROPHILS NFR BLD AUTO: 83 % (ref 42–75)
PLATELET # BLD AUTO: 206 X10^3/UL (ref 130–400)
PMV BLD AUTO: 7.7 FL (ref 7.4–10.4)
PROT SERPL-MCNC: 5.4 G/DL (ref 6.4–8.2)
RBC # BLD AUTO: 3.29 X10^6/UL (ref 3.82–5.3)

## 2018-09-12 RX ADMIN — LACTULOSE SCH GM: 10 SOLUTION ORAL at 08:28

## 2018-09-12 RX ADMIN — B-COMPLEX W/ C & FOLIC ACID TAB SCH TAB: TAB at 08:28

## 2018-09-12 RX ADMIN — SODIUM CHLORIDE, PRESERVATIVE FREE SCH ML: 5 INJECTION INTRAVENOUS at 21:21

## 2018-09-12 RX ADMIN — ONDANSETRON PRN MG: 4 TABLET, ORALLY DISINTEGRATING ORAL at 17:54

## 2018-09-12 RX ADMIN — HEPARIN SODIUM SCH UNITS: 5000 INJECTION, SOLUTION INTRAVENOUS; SUBCUTANEOUS at 21:20

## 2018-09-12 RX ADMIN — OXYCODONE HYDROCHLORIDE PRN MG: 5 TABLET ORAL at 22:44

## 2018-09-12 RX ADMIN — OXYCODONE HYDROCHLORIDE PRN MG: 5 TABLET ORAL at 08:50

## 2018-09-12 RX ADMIN — ONDANSETRON PRN MG: 4 TABLET, ORALLY DISINTEGRATING ORAL at 00:55

## 2018-09-12 RX ADMIN — OXYCODONE HYDROCHLORIDE PRN MG: 5 TABLET ORAL at 04:56

## 2018-09-12 RX ADMIN — OXYCODONE HYDROCHLORIDE PRN MG: 5 TABLET ORAL at 17:54

## 2018-09-12 RX ADMIN — PANTOPRAZOLE SODIUM SCH MG: 40 TABLET, DELAYED RELEASE ORAL at 08:28

## 2018-09-12 RX ADMIN — PROMETHAZINE HYDROCHLORIDE PRN MG: 25 INJECTION INTRAMUSCULAR; INTRAVENOUS at 21:20

## 2018-09-12 RX ADMIN — HYDROMORPHONE HYDROCHLORIDE PRN MG: 2 INJECTION INTRAMUSCULAR; INTRAVENOUS; SUBCUTANEOUS at 14:57

## 2018-09-12 RX ADMIN — ONDANSETRON PRN MG: 4 TABLET, ORALLY DISINTEGRATING ORAL at 04:57

## 2018-09-12 RX ADMIN — PANTOPRAZOLE SODIUM SCH MG: 40 TABLET, DELAYED RELEASE ORAL at 21:21

## 2018-09-12 RX ADMIN — HYDROMORPHONE HYDROCHLORIDE PRN MG: 2 INJECTION INTRAMUSCULAR; INTRAVENOUS; SUBCUTANEOUS at 11:03

## 2018-09-12 RX ADMIN — ERTAPENEM SODIUM SCH MLS/HR: 1 INJECTION, POWDER, LYOPHILIZED, FOR SOLUTION INTRAMUSCULAR; INTRAVENOUS at 13:16

## 2018-09-12 RX ADMIN — OXYCODONE HYDROCHLORIDE PRN MG: 5 TABLET ORAL at 13:16

## 2018-09-12 RX ADMIN — ALLOPURINOL SCH MG: 300 TABLET ORAL at 08:28

## 2018-09-12 RX ADMIN — PROMETHAZINE HYDROCHLORIDE PRN MG: 25 INJECTION INTRAMUSCULAR; INTRAVENOUS at 14:05

## 2018-09-12 RX ADMIN — SODIUM CHLORIDE, PRESERVATIVE FREE SCH ML: 5 INJECTION INTRAVENOUS at 08:28

## 2018-09-12 RX ADMIN — LACTULOSE SCH GM: 10 SOLUTION ORAL at 21:20

## 2018-09-12 RX ADMIN — ONDANSETRON PRN MG: 4 TABLET, ORALLY DISINTEGRATING ORAL at 08:50

## 2018-09-12 RX ADMIN — SODIUM CHLORIDE SCH MLS/HR: 0.9 INJECTION, SOLUTION INTRAVENOUS at 00:55

## 2018-09-12 RX ADMIN — OXYCODONE HYDROCHLORIDE PRN MG: 5 TABLET ORAL at 00:55

## 2018-09-12 RX ADMIN — CHOLECALCIFEROL TAB 125 MCG (5000 UNIT) SCH UNIT: 125 TAB at 08:28

## 2018-09-12 RX ADMIN — ONDANSETRON PRN MG: 4 TABLET, ORALLY DISINTEGRATING ORAL at 13:16

## 2018-09-12 RX ADMIN — MAGNESIUM OXIDE SCH MG: 400 TABLET ORAL at 08:28

## 2018-09-12 RX ADMIN — ONDANSETRON PRN MG: 4 TABLET, ORALLY DISINTEGRATING ORAL at 22:44

## 2018-09-12 RX ADMIN — HEPARIN SODIUM SCH UNITS: 5000 INJECTION, SOLUTION INTRAVENOUS; SUBCUTANEOUS at 08:29

## 2018-09-13 VITALS — SYSTOLIC BLOOD PRESSURE: 139 MMHG | DIASTOLIC BLOOD PRESSURE: 86 MMHG

## 2018-09-13 VITALS — DIASTOLIC BLOOD PRESSURE: 78 MMHG | SYSTOLIC BLOOD PRESSURE: 118 MMHG

## 2018-09-13 VITALS — SYSTOLIC BLOOD PRESSURE: 123 MMHG | DIASTOLIC BLOOD PRESSURE: 79 MMHG

## 2018-09-13 LAB
ALBUMIN SERPL-MCNC: 2.2 G/DL (ref 3.4–5)
ALP SERPL-CCNC: 115 U/L (ref 45–117)
ALT SERPL-CCNC: 12 U/L (ref 12–78)
ANION GAP SERPL CALC-SCNC: 9 MMOL/L (ref 5–15)
BASOPHILS # BLD AUTO: 0.01 X10^3/UL (ref 0–0.1)
BASOPHILS NFR BLD AUTO: 0 % (ref 0–1)
BILIRUB SERPL-MCNC: 0.4 MG/DL (ref 0.2–1)
CALCIUM SERPL-MCNC: 9.6 MG/DL (ref 8.5–10.1)
CHLORIDE SERPL-SCNC: 93 MMOL/L (ref 98–107)
CREAT SERPL-MCNC: 1.02 MG/DL (ref 0.55–1.02)
EOSINOPHIL # BLD AUTO: 0.09 X10^3/UL (ref 0–0.4)
EOSINOPHIL NFR BLD AUTO: 2 % (ref 1–7)
ERYTHROCYTE [DISTWIDTH] IN BLOOD BY AUTOMATED COUNT: 15.3 % (ref 9.6–15.2)
LYMPHOCYTES # BLD AUTO: 0.35 X10^3/UL (ref 1–3.4)
LYMPHOCYTES NFR BLD AUTO: 7 % (ref 22–44)
MCH RBC QN AUTO: 33.7 PG (ref 27–34.8)
MCHC RBC AUTO-ENTMCNC: 35.1 G/DL (ref 32.4–35.8)
MCV RBC AUTO: 96 FL (ref 80–100)
MD: NO
MONOCYTES # BLD AUTO: 0.22 X10^3/UL (ref 0.2–0.8)
MONOCYTES NFR BLD AUTO: 4 % (ref 2–9)
NEUTROPHILS # BLD AUTO: 4.39 X10^3/UL (ref 1.8–6.8)
NEUTROPHILS NFR BLD AUTO: 87 % (ref 42–75)
PLATELET # BLD AUTO: 234 X10^3/UL (ref 130–400)
PMV BLD AUTO: 7.7 FL (ref 7.4–10.4)
PROT SERPL-MCNC: 5.7 G/DL (ref 6.4–8.2)
RBC # BLD AUTO: 3.42 X10^6/UL (ref 3.82–5.3)

## 2018-09-13 RX ADMIN — LACTULOSE SCH GM: 10 SOLUTION ORAL at 19:32

## 2018-09-13 RX ADMIN — OXYCODONE HYDROCHLORIDE PRN MG: 5 TABLET ORAL at 18:08

## 2018-09-13 RX ADMIN — HEPARIN SODIUM SCH UNITS: 5000 INJECTION, SOLUTION INTRAVENOUS; SUBCUTANEOUS at 10:27

## 2018-09-13 RX ADMIN — HYDROMORPHONE HYDROCHLORIDE PRN MG: 2 INJECTION INTRAMUSCULAR; INTRAVENOUS; SUBCUTANEOUS at 04:50

## 2018-09-13 RX ADMIN — OXYCODONE HYDROCHLORIDE PRN MG: 5 TABLET ORAL at 04:17

## 2018-09-13 RX ADMIN — MAGNESIUM OXIDE SCH MG: 400 TABLET ORAL at 10:28

## 2018-09-13 RX ADMIN — ONDANSETRON PRN MG: 4 TABLET, ORALLY DISINTEGRATING ORAL at 21:55

## 2018-09-13 RX ADMIN — PANTOPRAZOLE SODIUM SCH MG: 40 TABLET, DELAYED RELEASE ORAL at 19:32

## 2018-09-13 RX ADMIN — SODIUM CHLORIDE, PRESERVATIVE FREE SCH ML: 5 INJECTION INTRAVENOUS at 19:33

## 2018-09-13 RX ADMIN — CHOLECALCIFEROL TAB 125 MCG (5000 UNIT) SCH UNIT: 125 TAB at 10:28

## 2018-09-13 RX ADMIN — SODIUM CHLORIDE, PRESERVATIVE FREE SCH ML: 5 INJECTION INTRAVENOUS at 10:28

## 2018-09-13 RX ADMIN — LACTULOSE SCH GM: 10 SOLUTION ORAL at 10:27

## 2018-09-13 RX ADMIN — HYDROMORPHONE HYDROCHLORIDE PRN MG: 2 INJECTION INTRAMUSCULAR; INTRAVENOUS; SUBCUTANEOUS at 19:32

## 2018-09-13 RX ADMIN — ALLOPURINOL SCH MG: 300 TABLET ORAL at 10:27

## 2018-09-13 RX ADMIN — ONDANSETRON PRN MG: 4 TABLET, ORALLY DISINTEGRATING ORAL at 18:08

## 2018-09-13 RX ADMIN — ONDANSETRON PRN MG: 4 TABLET, ORALLY DISINTEGRATING ORAL at 13:48

## 2018-09-13 RX ADMIN — OXYCODONE HYDROCHLORIDE PRN MG: 5 TABLET ORAL at 21:55

## 2018-09-13 RX ADMIN — HYDROMORPHONE HYDROCHLORIDE PRN MG: 2 INJECTION INTRAMUSCULAR; INTRAVENOUS; SUBCUTANEOUS at 10:28

## 2018-09-13 RX ADMIN — OXYCODONE HYDROCHLORIDE PRN MG: 5 TABLET ORAL at 13:48

## 2018-09-13 RX ADMIN — ERTAPENEM SODIUM SCH MLS/HR: 1 INJECTION, POWDER, LYOPHILIZED, FOR SOLUTION INTRAMUSCULAR; INTRAVENOUS at 13:44

## 2018-09-13 RX ADMIN — B-COMPLEX W/ C & FOLIC ACID TAB SCH TAB: TAB at 10:27

## 2018-09-13 RX ADMIN — ONDANSETRON PRN MG: 4 TABLET, ORALLY DISINTEGRATING ORAL at 04:17

## 2018-09-13 RX ADMIN — PANTOPRAZOLE SODIUM SCH MG: 40 TABLET, DELAYED RELEASE ORAL at 10:27

## 2018-09-13 RX ADMIN — HEPARIN SODIUM SCH UNITS: 5000 INJECTION, SOLUTION INTRAVENOUS; SUBCUTANEOUS at 21:55

## 2018-09-14 VITALS — SYSTOLIC BLOOD PRESSURE: 130 MMHG | DIASTOLIC BLOOD PRESSURE: 82 MMHG

## 2018-09-14 VITALS — SYSTOLIC BLOOD PRESSURE: 124 MMHG | DIASTOLIC BLOOD PRESSURE: 74 MMHG

## 2018-09-14 VITALS — SYSTOLIC BLOOD PRESSURE: 122 MMHG | DIASTOLIC BLOOD PRESSURE: 67 MMHG

## 2018-09-14 VITALS — DIASTOLIC BLOOD PRESSURE: 80 MMHG | SYSTOLIC BLOOD PRESSURE: 113 MMHG

## 2018-09-14 LAB
ALBUMIN SERPL-MCNC: 2.2 G/DL (ref 3.4–5)
ALP SERPL-CCNC: 127 U/L (ref 45–117)
ALT SERPL-CCNC: 10 U/L (ref 12–78)
ANION GAP SERPL CALC-SCNC: 9 MMOL/L (ref 5–15)
BASOPHILS # BLD AUTO: 0.02 X10^3/UL (ref 0–0.1)
BASOPHILS NFR BLD AUTO: 1 % (ref 0–1)
BILIRUB SERPL-MCNC: 0.4 MG/DL (ref 0.2–1)
CALCIUM SERPL-MCNC: 10.2 MG/DL (ref 8.5–10.1)
CHLORIDE SERPL-SCNC: 91 MMOL/L (ref 98–107)
CREAT SERPL-MCNC: 0.92 MG/DL (ref 0.55–1.02)
EOSINOPHIL # BLD AUTO: 0.09 X10^3/UL (ref 0–0.4)
EOSINOPHIL NFR BLD AUTO: 2 % (ref 1–7)
ERYTHROCYTE [DISTWIDTH] IN BLOOD BY AUTOMATED COUNT: 15.8 % (ref 9.6–15.2)
LYMPHOCYTES # BLD AUTO: 0.32 X10^3/UL (ref 1–3.4)
LYMPHOCYTES NFR BLD AUTO: 7 % (ref 22–44)
MCH RBC QN AUTO: 33.9 PG (ref 27–34.8)
MCHC RBC AUTO-ENTMCNC: 34.9 G/DL (ref 32.4–35.8)
MCV RBC AUTO: 97.1 FL (ref 80–100)
MD: NO
MONOCYTES # BLD AUTO: 0.2 X10^3/UL (ref 0.2–0.8)
MONOCYTES NFR BLD AUTO: 4 % (ref 2–9)
NEUTROPHILS # BLD AUTO: 3.8 X10^3/UL (ref 1.8–6.8)
NEUTROPHILS NFR BLD AUTO: 86 % (ref 42–75)
PLATELET # BLD AUTO: 235 X10^3/UL (ref 130–400)
PMV BLD AUTO: 7.3 FL (ref 7.4–10.4)
PROT SERPL-MCNC: 5.7 G/DL (ref 6.4–8.2)
RBC # BLD AUTO: 3.37 X10^6/UL (ref 3.82–5.3)

## 2018-09-14 RX ADMIN — ALLOPURINOL SCH MG: 300 TABLET ORAL at 09:00

## 2018-09-14 RX ADMIN — ONDANSETRON PRN MG: 4 TABLET, ORALLY DISINTEGRATING ORAL at 15:37

## 2018-09-14 RX ADMIN — PANTOPRAZOLE SODIUM SCH MG: 40 TABLET, DELAYED RELEASE ORAL at 10:08

## 2018-09-14 RX ADMIN — MAGNESIUM OXIDE SCH MG: 400 TABLET ORAL at 09:00

## 2018-09-14 RX ADMIN — B-COMPLEX W/ C & FOLIC ACID TAB SCH TAB: TAB at 09:00

## 2018-09-14 RX ADMIN — OXYCODONE HYDROCHLORIDE PRN MG: 5 TABLET ORAL at 09:17

## 2018-09-14 RX ADMIN — HYDROMORPHONE HYDROCHLORIDE PRN MG: 2 INJECTION INTRAMUSCULAR; INTRAVENOUS; SUBCUTANEOUS at 18:26

## 2018-09-14 RX ADMIN — DAPTOMYCIN SCH MLS/HR: 500 INJECTION, POWDER, LYOPHILIZED, FOR SOLUTION INTRAVENOUS at 17:03

## 2018-09-14 RX ADMIN — LACTULOSE SCH GM: 10 SOLUTION ORAL at 09:18

## 2018-09-14 RX ADMIN — HYDROMORPHONE HYDROCHLORIDE PRN MG: 2 INJECTION INTRAMUSCULAR; INTRAVENOUS; SUBCUTANEOUS at 05:08

## 2018-09-14 RX ADMIN — PANTOPRAZOLE SODIUM SCH MG: 40 TABLET, DELAYED RELEASE ORAL at 20:13

## 2018-09-14 RX ADMIN — OXYCODONE HYDROCHLORIDE PRN MG: 5 TABLET ORAL at 20:12

## 2018-09-14 RX ADMIN — OXYCODONE HYDROCHLORIDE PRN MG: 5 TABLET ORAL at 03:57

## 2018-09-14 RX ADMIN — SODIUM CHLORIDE, PRESERVATIVE FREE SCH ML: 5 INJECTION INTRAVENOUS at 20:14

## 2018-09-14 RX ADMIN — SODIUM CHLORIDE SCH MLS/HR: 0.9 INJECTION, SOLUTION INTRAVENOUS at 18:26

## 2018-09-14 RX ADMIN — SODIUM CHLORIDE, PRESERVATIVE FREE SCH ML: 5 INJECTION INTRAVENOUS at 09:00

## 2018-09-14 RX ADMIN — ONDANSETRON PRN MG: 4 TABLET, ORALLY DISINTEGRATING ORAL at 03:57

## 2018-09-14 RX ADMIN — ONDANSETRON PRN MG: 4 TABLET, ORALLY DISINTEGRATING ORAL at 09:17

## 2018-09-14 RX ADMIN — OXYCODONE HYDROCHLORIDE PRN MG: 5 TABLET ORAL at 15:37

## 2018-09-14 RX ADMIN — HYDROMORPHONE HYDROCHLORIDE PRN MG: 2 INJECTION INTRAMUSCULAR; INTRAVENOUS; SUBCUTANEOUS at 22:32

## 2018-09-14 RX ADMIN — HEPARIN SODIUM SCH UNITS: 5000 INJECTION, SOLUTION INTRAVENOUS; SUBCUTANEOUS at 09:17

## 2018-09-14 RX ADMIN — POLYETHYLENE GLYCOL 3350 PRN GM: 17 POWDER, FOR SOLUTION ORAL at 03:57

## 2018-09-14 RX ADMIN — ONDANSETRON PRN MG: 4 TABLET, ORALLY DISINTEGRATING ORAL at 20:15

## 2018-09-14 RX ADMIN — CHOLECALCIFEROL TAB 125 MCG (5000 UNIT) SCH UNIT: 125 TAB at 09:00

## 2018-09-14 RX ADMIN — LACTULOSE SCH GM: 10 SOLUTION ORAL at 20:14

## 2018-09-14 RX ADMIN — ERTAPENEM SODIUM SCH MLS/HR: 1 INJECTION, POWDER, LYOPHILIZED, FOR SOLUTION INTRAMUSCULAR; INTRAVENOUS at 14:08

## 2018-09-14 RX ADMIN — HEPARIN SODIUM SCH UNITS: 5000 INJECTION, SOLUTION INTRAVENOUS; SUBCUTANEOUS at 20:13

## 2018-09-15 VITALS — SYSTOLIC BLOOD PRESSURE: 116 MMHG | DIASTOLIC BLOOD PRESSURE: 79 MMHG

## 2018-09-15 VITALS — SYSTOLIC BLOOD PRESSURE: 119 MMHG | DIASTOLIC BLOOD PRESSURE: 76 MMHG

## 2018-09-15 VITALS — DIASTOLIC BLOOD PRESSURE: 81 MMHG | SYSTOLIC BLOOD PRESSURE: 145 MMHG

## 2018-09-15 VITALS — DIASTOLIC BLOOD PRESSURE: 79 MMHG | SYSTOLIC BLOOD PRESSURE: 116 MMHG

## 2018-09-15 LAB
ALBUMIN SERPL-MCNC: 2 G/DL (ref 3.4–5)
ALP SERPL-CCNC: 122 U/L (ref 45–117)
ALT SERPL-CCNC: 9 U/L (ref 12–78)
ANION GAP SERPL CALC-SCNC: 9 MMOL/L (ref 5–15)
BASOPHILS # BLD AUTO: 0.03 X10^3/UL (ref 0–0.1)
BASOPHILS NFR BLD AUTO: 1 % (ref 0–1)
BILIRUB SERPL-MCNC: 0.5 MG/DL (ref 0.2–1)
CALCIUM SERPL-MCNC: 10.3 MG/DL (ref 8.5–10.1)
CHLORIDE SERPL-SCNC: 91 MMOL/L (ref 98–107)
CREAT SERPL-MCNC: 0.83 MG/DL (ref 0.55–1.02)
EOSINOPHIL # BLD AUTO: 0.1 X10^3/UL (ref 0–0.4)
EOSINOPHIL NFR BLD AUTO: 3 % (ref 1–7)
ERYTHROCYTE [DISTWIDTH] IN BLOOD BY AUTOMATED COUNT: 15.2 % (ref 9.6–15.2)
LYMPHOCYTES # BLD AUTO: 0.33 X10^3/UL (ref 1–3.4)
LYMPHOCYTES NFR BLD AUTO: 9 % (ref 22–44)
MCH RBC QN AUTO: 33.9 PG (ref 27–34.8)
MCHC RBC AUTO-ENTMCNC: 35.5 G/DL (ref 32.4–35.8)
MCV RBC AUTO: 95.6 FL (ref 80–100)
MD: NO
MONOCYTES # BLD AUTO: 0.13 X10^3/UL (ref 0.2–0.8)
MONOCYTES NFR BLD AUTO: 4 % (ref 2–9)
NEUTROPHILS # BLD AUTO: 3.07 X10^3/UL (ref 1.8–6.8)
NEUTROPHILS NFR BLD AUTO: 84 % (ref 42–75)
PLATELET # BLD AUTO: 237 X10^3/UL (ref 130–400)
PMV BLD AUTO: 7.5 FL (ref 7.4–10.4)
PROT SERPL-MCNC: 5.3 G/DL (ref 6.4–8.2)
RBC # BLD AUTO: 3.17 X10^6/UL (ref 3.82–5.3)

## 2018-09-15 RX ADMIN — OXYCODONE HYDROCHLORIDE PRN MG: 5 TABLET ORAL at 05:34

## 2018-09-15 RX ADMIN — OXYCODONE HYDROCHLORIDE PRN MG: 5 TABLET ORAL at 19:17

## 2018-09-15 RX ADMIN — SODIUM CHLORIDE SCH MLS/HR: 0.9 INJECTION, SOLUTION INTRAVENOUS at 17:30

## 2018-09-15 RX ADMIN — B-COMPLEX W/ C & FOLIC ACID TAB SCH TAB: TAB at 09:13

## 2018-09-15 RX ADMIN — ONDANSETRON PRN MG: 4 TABLET, ORALLY DISINTEGRATING ORAL at 05:34

## 2018-09-15 RX ADMIN — PANTOPRAZOLE SODIUM SCH MG: 40 TABLET, DELAYED RELEASE ORAL at 20:00

## 2018-09-15 RX ADMIN — HEPARIN SODIUM SCH UNITS: 5000 INJECTION, SOLUTION INTRAVENOUS; SUBCUTANEOUS at 09:15

## 2018-09-15 RX ADMIN — OXYCODONE HYDROCHLORIDE PRN MG: 5 TABLET ORAL at 11:39

## 2018-09-15 RX ADMIN — SODIUM CHLORIDE SCH MLS/HR: 0.9 INJECTION, SOLUTION INTRAVENOUS at 09:15

## 2018-09-15 RX ADMIN — MAGNESIUM OXIDE SCH MG: 400 TABLET ORAL at 09:13

## 2018-09-15 RX ADMIN — CHOLECALCIFEROL TAB 125 MCG (5000 UNIT) SCH UNIT: 125 TAB at 11:42

## 2018-09-15 RX ADMIN — ONDANSETRON PRN MG: 4 TABLET, ORALLY DISINTEGRATING ORAL at 11:39

## 2018-09-15 RX ADMIN — ERTAPENEM SODIUM SCH MLS/HR: 1 INJECTION, POWDER, LYOPHILIZED, FOR SOLUTION INTRAMUSCULAR; INTRAVENOUS at 13:42

## 2018-09-15 RX ADMIN — LACTULOSE SCH GM: 10 SOLUTION ORAL at 09:14

## 2018-09-15 RX ADMIN — HYDROMORPHONE HYDROCHLORIDE PRN MG: 2 INJECTION INTRAMUSCULAR; INTRAVENOUS; SUBCUTANEOUS at 09:15

## 2018-09-15 RX ADMIN — DAPTOMYCIN SCH MLS/HR: 500 INJECTION, POWDER, LYOPHILIZED, FOR SOLUTION INTRAVENOUS at 15:18

## 2018-09-15 RX ADMIN — HYDROMORPHONE HYDROCHLORIDE PRN MG: 2 INJECTION INTRAMUSCULAR; INTRAVENOUS; SUBCUTANEOUS at 15:18

## 2018-09-15 RX ADMIN — ALLOPURINOL SCH MG: 300 TABLET ORAL at 09:13

## 2018-09-15 RX ADMIN — PANTOPRAZOLE SODIUM SCH MG: 40 TABLET, DELAYED RELEASE ORAL at 09:13

## 2018-09-15 RX ADMIN — ONDANSETRON PRN MG: 4 TABLET, ORALLY DISINTEGRATING ORAL at 19:17

## 2018-09-15 RX ADMIN — SODIUM CHLORIDE, PRESERVATIVE FREE SCH ML: 5 INJECTION INTRAVENOUS at 11:39

## 2018-09-15 RX ADMIN — SODIUM CHLORIDE, PRESERVATIVE FREE SCH ML: 5 INJECTION INTRAVENOUS at 20:01

## 2018-09-15 RX ADMIN — LACTULOSE SCH GM: 10 SOLUTION ORAL at 20:00

## 2018-09-15 RX ADMIN — HEPARIN SODIUM SCH UNITS: 5000 INJECTION, SOLUTION INTRAVENOUS; SUBCUTANEOUS at 20:00

## 2018-09-15 RX ADMIN — SODIUM CHLORIDE SCH MLS/HR: 0.9 INJECTION, SOLUTION INTRAVENOUS at 20:00

## 2018-09-16 VITALS — SYSTOLIC BLOOD PRESSURE: 119 MMHG | DIASTOLIC BLOOD PRESSURE: 68 MMHG

## 2018-09-16 VITALS — DIASTOLIC BLOOD PRESSURE: 83 MMHG | SYSTOLIC BLOOD PRESSURE: 127 MMHG

## 2018-09-16 VITALS — DIASTOLIC BLOOD PRESSURE: 72 MMHG | SYSTOLIC BLOOD PRESSURE: 114 MMHG

## 2018-09-16 VITALS — SYSTOLIC BLOOD PRESSURE: 124 MMHG | DIASTOLIC BLOOD PRESSURE: 82 MMHG

## 2018-09-16 LAB
ALBUMIN SERPL-MCNC: 2 G/DL (ref 3.4–5)
ALP SERPL-CCNC: 117 U/L (ref 45–117)
ALT SERPL-CCNC: 9 U/L (ref 12–78)
ANION GAP SERPL CALC-SCNC: 10 MMOL/L (ref 5–15)
BASOPHILS # BLD AUTO: 0.02 X10^3/UL (ref 0–0.1)
BASOPHILS NFR BLD AUTO: 1 % (ref 0–1)
BILIRUB SERPL-MCNC: 0.4 MG/DL (ref 0.2–1)
CALCIUM SERPL-MCNC: 10.6 MG/DL (ref 8.5–10.1)
CHLORIDE SERPL-SCNC: 94 MMOL/L (ref 98–107)
CREAT SERPL-MCNC: 0.76 MG/DL (ref 0.55–1.02)
EOSINOPHIL # BLD AUTO: 0.11 X10^3/UL (ref 0–0.4)
EOSINOPHIL NFR BLD AUTO: 4 % (ref 1–7)
ERYTHROCYTE [DISTWIDTH] IN BLOOD BY AUTOMATED COUNT: 15.3 % (ref 9.6–15.2)
LYMPHOCYTES # BLD AUTO: 0.26 X10^3/UL (ref 1–3.4)
LYMPHOCYTES NFR BLD AUTO: 8 % (ref 22–44)
MCH RBC QN AUTO: 33.1 PG (ref 27–34.8)
MCHC RBC AUTO-ENTMCNC: 34.8 G/DL (ref 32.4–35.8)
MCV RBC AUTO: 95.3 FL (ref 80–100)
MD: NO
MONOCYTES # BLD AUTO: 0.15 X10^3/UL (ref 0.2–0.8)
MONOCYTES NFR BLD AUTO: 5 % (ref 2–9)
NEUTROPHILS # BLD AUTO: 2.58 X10^3/UL (ref 1.8–6.8)
NEUTROPHILS NFR BLD AUTO: 83 % (ref 42–75)
PLATELET # BLD AUTO: 227 X10^3/UL (ref 130–400)
PMV BLD AUTO: 7.1 FL (ref 7.4–10.4)
PROT SERPL-MCNC: 4.9 G/DL (ref 6.4–8.2)
RBC # BLD AUTO: 3.04 X10^6/UL (ref 3.82–5.3)

## 2018-09-16 RX ADMIN — ONDANSETRON PRN MG: 4 TABLET, ORALLY DISINTEGRATING ORAL at 01:11

## 2018-09-16 RX ADMIN — HEPARIN SODIUM SCH UNITS: 5000 INJECTION, SOLUTION INTRAVENOUS; SUBCUTANEOUS at 09:08

## 2018-09-16 RX ADMIN — PROMETHAZINE HYDROCHLORIDE PRN MG: 25 INJECTION INTRAMUSCULAR; INTRAVENOUS at 13:02

## 2018-09-16 RX ADMIN — HYDROMORPHONE HYDROCHLORIDE PRN MG: 2 INJECTION INTRAMUSCULAR; INTRAVENOUS; SUBCUTANEOUS at 09:08

## 2018-09-16 RX ADMIN — DAPTOMYCIN SCH MLS/HR: 500 INJECTION, POWDER, LYOPHILIZED, FOR SOLUTION INTRAVENOUS at 18:00

## 2018-09-16 RX ADMIN — OXYCODONE HYDROCHLORIDE PRN MG: 5 TABLET ORAL at 01:11

## 2018-09-16 RX ADMIN — ONDANSETRON PRN MG: 4 TABLET, ORALLY DISINTEGRATING ORAL at 11:35

## 2018-09-16 RX ADMIN — LACTULOSE SCH GM: 10 SOLUTION ORAL at 19:50

## 2018-09-16 RX ADMIN — TEMAZEPAM PRN MG: 15 CAPSULE ORAL at 21:40

## 2018-09-16 RX ADMIN — SODIUM CHLORIDE SCH MLS/HR: 0.9 INJECTION, SOLUTION INTRAVENOUS at 03:41

## 2018-09-16 RX ADMIN — HEPARIN SODIUM SCH UNITS: 5000 INJECTION, SOLUTION INTRAVENOUS; SUBCUTANEOUS at 19:50

## 2018-09-16 RX ADMIN — ONDANSETRON PRN MG: 4 TABLET, ORALLY DISINTEGRATING ORAL at 06:26

## 2018-09-16 RX ADMIN — MAGNESIUM OXIDE SCH MG: 400 TABLET ORAL at 09:07

## 2018-09-16 RX ADMIN — SODIUM CHLORIDE, PRESERVATIVE FREE SCH ML: 5 INJECTION INTRAVENOUS at 09:07

## 2018-09-16 RX ADMIN — PANTOPRAZOLE SODIUM SCH MG: 40 TABLET, DELAYED RELEASE ORAL at 09:07

## 2018-09-16 RX ADMIN — ONDANSETRON PRN MG: 4 TABLET, ORALLY DISINTEGRATING ORAL at 18:00

## 2018-09-16 RX ADMIN — OXYCODONE HYDROCHLORIDE PRN MG: 5 TABLET ORAL at 06:26

## 2018-09-16 RX ADMIN — HYDROMORPHONE HYDROCHLORIDE PRN MG: 2 INJECTION INTRAMUSCULAR; INTRAVENOUS; SUBCUTANEOUS at 05:37

## 2018-09-16 RX ADMIN — PANTOPRAZOLE SODIUM SCH MG: 40 TABLET, DELAYED RELEASE ORAL at 19:50

## 2018-09-16 RX ADMIN — LACTULOSE SCH GM: 10 SOLUTION ORAL at 09:07

## 2018-09-16 RX ADMIN — ALLOPURINOL SCH MG: 300 TABLET ORAL at 09:07

## 2018-09-16 RX ADMIN — HYDROMORPHONE HYDROCHLORIDE PRN MG: 2 INJECTION INTRAMUSCULAR; INTRAVENOUS; SUBCUTANEOUS at 19:51

## 2018-09-16 RX ADMIN — CHOLECALCIFEROL TAB 125 MCG (5000 UNIT) SCH UNIT: 125 TAB at 09:07

## 2018-09-16 RX ADMIN — OXYCODONE HYDROCHLORIDE PRN MG: 5 TABLET ORAL at 11:35

## 2018-09-16 RX ADMIN — OXYCODONE HYDROCHLORIDE PRN MG: 5 TABLET ORAL at 18:01

## 2018-09-16 RX ADMIN — B-COMPLEX W/ C & FOLIC ACID TAB SCH TAB: TAB at 09:07

## 2018-09-16 RX ADMIN — ERTAPENEM SODIUM SCH MLS/HR: 1 INJECTION, POWDER, LYOPHILIZED, FOR SOLUTION INTRAMUSCULAR; INTRAVENOUS at 13:02

## 2018-09-16 RX ADMIN — SODIUM CHLORIDE, PRESERVATIVE FREE SCH ML: 5 INJECTION INTRAVENOUS at 19:50

## 2018-09-17 VITALS — SYSTOLIC BLOOD PRESSURE: 115 MMHG | DIASTOLIC BLOOD PRESSURE: 71 MMHG

## 2018-09-17 VITALS — SYSTOLIC BLOOD PRESSURE: 110 MMHG | DIASTOLIC BLOOD PRESSURE: 73 MMHG

## 2018-09-17 VITALS — DIASTOLIC BLOOD PRESSURE: 74 MMHG | SYSTOLIC BLOOD PRESSURE: 111 MMHG

## 2018-09-17 VITALS — SYSTOLIC BLOOD PRESSURE: 138 MMHG | DIASTOLIC BLOOD PRESSURE: 75 MMHG

## 2018-09-17 LAB
ALBUMIN SERPL-MCNC: 2.1 G/DL (ref 3.4–5)
ALP SERPL-CCNC: 131 U/L (ref 45–117)
ALT SERPL-CCNC: 11 U/L (ref 12–78)
ANION GAP SERPL CALC-SCNC: 9 MMOL/L (ref 5–15)
BASOPHILS # BLD AUTO: 0.01 X10^3/UL (ref 0–0.1)
BASOPHILS NFR BLD AUTO: 0 % (ref 0–1)
BILIRUB SERPL-MCNC: 0.4 MG/DL (ref 0.2–1)
CALCIUM SERPL-MCNC: 11.6 MG/DL (ref 8.5–10.1)
CHLORIDE SERPL-SCNC: 95 MMOL/L (ref 98–107)
CREAT SERPL-MCNC: 0.8 MG/DL (ref 0.55–1.02)
EOSINOPHIL # BLD AUTO: 0.09 X10^3/UL (ref 0–0.4)
EOSINOPHIL NFR BLD AUTO: 2 % (ref 1–7)
ERYTHROCYTE [DISTWIDTH] IN BLOOD BY AUTOMATED COUNT: 15.4 % (ref 9.6–15.2)
LYMPHOCYTES # BLD AUTO: 0.3 X10^3/UL (ref 1–3.4)
LYMPHOCYTES NFR BLD AUTO: 8 % (ref 22–44)
MCH RBC QN AUTO: 33.9 PG (ref 27–34.8)
MCHC RBC AUTO-ENTMCNC: 35.2 G/DL (ref 32.4–35.8)
MCV RBC AUTO: 96.5 FL (ref 80–100)
MD: NO
MONOCYTES # BLD AUTO: 0.17 X10^3/UL (ref 0.2–0.8)
MONOCYTES NFR BLD AUTO: 4 % (ref 2–9)
NEUTROPHILS # BLD AUTO: 3.16 X10^3/UL (ref 1.8–6.8)
NEUTROPHILS NFR BLD AUTO: 85 % (ref 42–75)
PLATELET # BLD AUTO: 237 X10^3/UL (ref 130–400)
PMV BLD AUTO: 7.4 FL (ref 7.4–10.4)
PROT SERPL-MCNC: 5.3 G/DL (ref 6.4–8.2)
RBC # BLD AUTO: 3.21 X10^6/UL (ref 3.82–5.3)

## 2018-09-17 RX ADMIN — DAPTOMYCIN SCH MLS/HR: 500 INJECTION, POWDER, LYOPHILIZED, FOR SOLUTION INTRAVENOUS at 15:33

## 2018-09-17 RX ADMIN — METHOCARBAMOL PRN MG: 750 TABLET ORAL at 22:16

## 2018-09-17 RX ADMIN — B-COMPLEX W/ C & FOLIC ACID TAB SCH TAB: TAB at 09:06

## 2018-09-17 RX ADMIN — CHOLECALCIFEROL TAB 125 MCG (5000 UNIT) SCH UNIT: 125 TAB at 08:26

## 2018-09-17 RX ADMIN — OXYCODONE HYDROCHLORIDE PRN MG: 5 TABLET ORAL at 08:29

## 2018-09-17 RX ADMIN — OXYCODONE HYDROCHLORIDE PRN MG: 5 TABLET ORAL at 02:39

## 2018-09-17 RX ADMIN — MAGNESIUM OXIDE SCH MG: 400 TABLET ORAL at 08:26

## 2018-09-17 RX ADMIN — HYDROMORPHONE HYDROCHLORIDE PRN MG: 2 INJECTION INTRAMUSCULAR; INTRAVENOUS; SUBCUTANEOUS at 06:14

## 2018-09-17 RX ADMIN — ONDANSETRON PRN MG: 4 TABLET, ORALLY DISINTEGRATING ORAL at 02:39

## 2018-09-17 RX ADMIN — ERTAPENEM SODIUM SCH MLS/HR: 1 INJECTION, POWDER, LYOPHILIZED, FOR SOLUTION INTRAMUSCULAR; INTRAVENOUS at 14:47

## 2018-09-17 RX ADMIN — OXYCODONE HYDROCHLORIDE PRN MG: 5 TABLET ORAL at 17:38

## 2018-09-17 RX ADMIN — SODIUM CHLORIDE, PRESERVATIVE FREE SCH ML: 5 INJECTION INTRAVENOUS at 21:05

## 2018-09-17 RX ADMIN — ONDANSETRON PRN MG: 4 TABLET, ORALLY DISINTEGRATING ORAL at 12:43

## 2018-09-17 RX ADMIN — HEPARIN SODIUM SCH UNITS: 5000 INJECTION, SOLUTION INTRAVENOUS; SUBCUTANEOUS at 08:27

## 2018-09-17 RX ADMIN — SODIUM CHLORIDE, PRESERVATIVE FREE SCH ML: 5 INJECTION INTRAVENOUS at 08:32

## 2018-09-17 RX ADMIN — ONDANSETRON PRN MG: 4 TABLET, ORALLY DISINTEGRATING ORAL at 17:38

## 2018-09-17 RX ADMIN — SODIUM CHLORIDE SCH MLS/HR: 0.9 INJECTION, SOLUTION INTRAVENOUS at 21:06

## 2018-09-17 RX ADMIN — OXYCODONE HYDROCHLORIDE PRN MG: 5 TABLET ORAL at 12:43

## 2018-09-17 RX ADMIN — HEPARIN SODIUM SCH UNITS: 5000 INJECTION, SOLUTION INTRAVENOUS; SUBCUTANEOUS at 21:04

## 2018-09-17 RX ADMIN — HYDROMORPHONE HYDROCHLORIDE PRN MG: 2 INJECTION INTRAMUSCULAR; INTRAVENOUS; SUBCUTANEOUS at 21:05

## 2018-09-17 RX ADMIN — LACTULOSE SCH GM: 10 SOLUTION ORAL at 08:28

## 2018-09-17 RX ADMIN — METHOCARBAMOL PRN MG: 750 TABLET ORAL at 14:47

## 2018-09-17 RX ADMIN — ALLOPURINOL SCH MG: 300 TABLET ORAL at 08:27

## 2018-09-17 RX ADMIN — LACTULOSE SCH GM: 10 SOLUTION ORAL at 21:04

## 2018-09-17 RX ADMIN — PANTOPRAZOLE SODIUM SCH MG: 40 TABLET, DELAYED RELEASE ORAL at 08:26

## 2018-09-17 RX ADMIN — ONDANSETRON PRN MG: 4 TABLET, ORALLY DISINTEGRATING ORAL at 08:28

## 2018-09-18 VITALS — SYSTOLIC BLOOD PRESSURE: 110 MMHG | DIASTOLIC BLOOD PRESSURE: 72 MMHG

## 2018-09-18 VITALS — DIASTOLIC BLOOD PRESSURE: 68 MMHG | SYSTOLIC BLOOD PRESSURE: 104 MMHG

## 2018-09-18 VITALS — SYSTOLIC BLOOD PRESSURE: 103 MMHG | DIASTOLIC BLOOD PRESSURE: 70 MMHG

## 2018-09-18 VITALS — DIASTOLIC BLOOD PRESSURE: 70 MMHG | SYSTOLIC BLOOD PRESSURE: 106 MMHG

## 2018-09-18 LAB
ALBUMIN SERPL-MCNC: 1.9 G/DL (ref 3.4–5)
ALP SERPL-CCNC: 112 U/L (ref 45–117)
ALT SERPL-CCNC: 10 U/L (ref 12–78)
ANION GAP SERPL CALC-SCNC: 8 MMOL/L (ref 5–15)
BASOPHILS # BLD AUTO: 0.01 X10^3/UL (ref 0–0.1)
BASOPHILS NFR BLD AUTO: 0 % (ref 0–1)
BILIRUB SERPL-MCNC: 0.4 MG/DL (ref 0.2–1)
CALCIUM SERPL-MCNC: 11.2 MG/DL (ref 8.5–10.1)
CHLORIDE SERPL-SCNC: 98 MMOL/L (ref 98–107)
CREAT SERPL-MCNC: 0.74 MG/DL (ref 0.55–1.02)
EOSINOPHIL # BLD AUTO: 0.1 X10^3/UL (ref 0–0.4)
EOSINOPHIL NFR BLD AUTO: 4 % (ref 1–7)
ERYTHROCYTE [DISTWIDTH] IN BLOOD BY AUTOMATED COUNT: 15.6 % (ref 9.6–15.2)
LYMPHOCYTES # BLD AUTO: 0.24 X10^3/UL (ref 1–3.4)
LYMPHOCYTES NFR BLD AUTO: 9 % (ref 22–44)
MCH RBC QN AUTO: 32.9 PG (ref 27–34.8)
MCHC RBC AUTO-ENTMCNC: 34.3 G/DL (ref 32.4–35.8)
MCV RBC AUTO: 95.9 FL (ref 80–100)
MD: (no result)
MONOCYTES # BLD AUTO: 0.09 X10^3/UL (ref 0.2–0.8)
MONOCYTES NFR BLD AUTO: 3 % (ref 2–9)
NEUTROPHILS # BLD AUTO: 2.3 X10^3/UL (ref 1.8–6.8)
NEUTROPHILS NFR BLD AUTO: 84 % (ref 42–75)
PLATELET # BLD AUTO: 219 X10^3/UL (ref 130–400)
PMV BLD AUTO: 7.3 FL (ref 7.4–10.4)
PROT SERPL-MCNC: 4.7 G/DL (ref 6.4–8.2)
RBC # BLD AUTO: 2.92 X10^6/UL (ref 3.82–5.3)

## 2018-09-18 RX ADMIN — ONDANSETRON PRN MG: 4 TABLET, ORALLY DISINTEGRATING ORAL at 00:52

## 2018-09-18 RX ADMIN — OXYCODONE HYDROCHLORIDE PRN MG: 5 TABLET ORAL at 05:12

## 2018-09-18 RX ADMIN — ALLOPURINOL SCH MG: 300 TABLET ORAL at 10:41

## 2018-09-18 RX ADMIN — METHOCARBAMOL PRN MG: 750 TABLET ORAL at 08:32

## 2018-09-18 RX ADMIN — PANTOPRAZOLE SODIUM SCH MG: 40 TABLET, DELAYED RELEASE ORAL at 08:30

## 2018-09-18 RX ADMIN — SODIUM CHLORIDE, PRESERVATIVE FREE SCH ML: 5 INJECTION INTRAVENOUS at 08:32

## 2018-09-18 RX ADMIN — LACTULOSE SCH GM: 10 SOLUTION ORAL at 08:32

## 2018-09-18 RX ADMIN — DAPTOMYCIN SCH MLS/HR: 500 INJECTION, POWDER, LYOPHILIZED, FOR SOLUTION INTRAVENOUS at 15:56

## 2018-09-18 RX ADMIN — OXYCODONE HYDROCHLORIDE PRN MG: 5 TABLET ORAL at 05:00

## 2018-09-18 RX ADMIN — OXYCODONE HYDROCHLORIDE PRN MG: 5 TABLET ORAL at 21:46

## 2018-09-18 RX ADMIN — SODIUM CHLORIDE SCH MLS/HR: 0.9 INJECTION, SOLUTION INTRAVENOUS at 04:05

## 2018-09-18 RX ADMIN — PANTOPRAZOLE SODIUM SCH MG: 40 TABLET, DELAYED RELEASE ORAL at 09:00

## 2018-09-18 RX ADMIN — OXYCODONE HYDROCHLORIDE PRN MG: 5 TABLET ORAL at 09:58

## 2018-09-18 RX ADMIN — MAGNESIUM OXIDE SCH MG: 400 TABLET ORAL at 08:32

## 2018-09-18 RX ADMIN — SODIUM CHLORIDE, PRESERVATIVE FREE SCH ML: 5 INJECTION INTRAVENOUS at 20:40

## 2018-09-18 RX ADMIN — OXYCODONE HYDROCHLORIDE PRN MG: 5 TABLET ORAL at 00:52

## 2018-09-18 RX ADMIN — PANTOPRAZOLE SODIUM SCH MG: 40 TABLET, DELAYED RELEASE ORAL at 20:40

## 2018-09-18 RX ADMIN — OXYCODONE HYDROCHLORIDE PRN MG: 5 TABLET ORAL at 15:56

## 2018-09-18 RX ADMIN — HEPARIN SODIUM SCH UNITS: 5000 INJECTION, SOLUTION INTRAVENOUS; SUBCUTANEOUS at 08:33

## 2018-09-18 RX ADMIN — ERTAPENEM SODIUM SCH MLS/HR: 1 INJECTION, POWDER, LYOPHILIZED, FOR SOLUTION INTRAMUSCULAR; INTRAVENOUS at 12:57

## 2018-09-18 RX ADMIN — LACTULOSE SCH GM: 10 SOLUTION ORAL at 20:39

## 2018-09-18 RX ADMIN — B-COMPLEX W/ C & FOLIC ACID TAB SCH TAB: TAB at 08:32

## 2018-09-18 RX ADMIN — CHOLECALCIFEROL TAB 125 MCG (5000 UNIT) SCH UNIT: 125 TAB at 08:32

## 2018-09-18 RX ADMIN — HEPARIN SODIUM SCH UNITS: 5000 INJECTION, SOLUTION INTRAVENOUS; SUBCUTANEOUS at 20:39

## 2018-09-18 RX ADMIN — ALLOPURINOL SCH MG: 300 TABLET ORAL at 08:31

## 2018-09-18 RX ADMIN — SODIUM CHLORIDE SCH MLS/HR: 0.9 INJECTION, SOLUTION INTRAVENOUS at 10:28

## 2018-09-18 RX ADMIN — ONDANSETRON PRN MG: 4 TABLET, ORALLY DISINTEGRATING ORAL at 05:00

## 2018-09-19 VITALS — DIASTOLIC BLOOD PRESSURE: 72 MMHG | SYSTOLIC BLOOD PRESSURE: 110 MMHG

## 2018-09-19 VITALS — SYSTOLIC BLOOD PRESSURE: 118 MMHG | DIASTOLIC BLOOD PRESSURE: 67 MMHG

## 2018-09-19 VITALS — SYSTOLIC BLOOD PRESSURE: 115 MMHG | DIASTOLIC BLOOD PRESSURE: 74 MMHG

## 2018-09-19 VITALS — DIASTOLIC BLOOD PRESSURE: 68 MMHG | SYSTOLIC BLOOD PRESSURE: 118 MMHG

## 2018-09-19 LAB
ALBUMIN SERPL-MCNC: 1.9 G/DL (ref 3.4–5)
ALP SERPL-CCNC: 106 U/L (ref 45–117)
ALT SERPL-CCNC: 10 U/L (ref 12–78)
ANION GAP SERPL CALC-SCNC: 8 MMOL/L (ref 5–15)
BASOPHILS # BLD AUTO: 0 X10^3/UL (ref 0–0.1)
BASOPHILS NFR BLD AUTO: 0 % (ref 0–1)
BILIRUB SERPL-MCNC: 0.3 MG/DL (ref 0.2–1)
CALCIUM SERPL-MCNC: 11.2 MG/DL (ref 8.5–10.1)
CHLORIDE SERPL-SCNC: 103 MMOL/L (ref 98–107)
CREAT SERPL-MCNC: 0.65 MG/DL (ref 0.55–1.02)
EOSINOPHIL # BLD AUTO: 0 X10^3/UL (ref 0–0.4)
EOSINOPHIL NFR BLD AUTO: 0 % (ref 1–7)
ERYTHROCYTE [DISTWIDTH] IN BLOOD BY AUTOMATED COUNT: 16.2 % (ref 9.6–15.2)
LYMPHOCYTES # BLD AUTO: 0.12 X10^3/UL (ref 1–3.4)
LYMPHOCYTES NFR BLD AUTO: 4 % (ref 22–44)
MCH RBC QN AUTO: 33.6 PG (ref 27–34.8)
MCHC RBC AUTO-ENTMCNC: 34.6 G/DL (ref 32.4–35.8)
MCV RBC AUTO: 97.1 FL (ref 80–100)
MD: (no result)
MONOCYTES # BLD AUTO: 0.02 X10^3/UL (ref 0.2–0.8)
MONOCYTES NFR BLD AUTO: 1 % (ref 2–9)
NEUTROPHILS # BLD AUTO: 2.95 X10^3/UL (ref 1.8–6.8)
NEUTROPHILS NFR BLD AUTO: 95 % (ref 42–75)
PLATELET # BLD AUTO: 194 X10^3/UL (ref 130–400)
PMV BLD AUTO: 7.6 FL (ref 7.4–10.4)
PROT SERPL-MCNC: 4.7 G/DL (ref 6.4–8.2)
RBC # BLD AUTO: 2.78 X10^6/UL (ref 3.82–5.3)

## 2018-09-19 PROCEDURE — 3E0R305 INTRODUCTION OF OTHER ANTINEOPLASTIC INTO SPINAL CANAL, PERCUTANEOUS APPROACH: ICD-10-PCS | Performed by: RADIOLOGY

## 2018-09-19 PROCEDURE — 009U3ZZ DRAINAGE OF SPINAL CANAL, PERCUTANEOUS APPROACH: ICD-10-PCS | Performed by: RADIOLOGY

## 2018-09-19 PROCEDURE — B01B1ZZ FLUOROSCOPY OF SPINAL CORD USING LOW OSMOLAR CONTRAST: ICD-10-PCS | Performed by: RADIOLOGY

## 2018-09-19 RX ADMIN — PROMETHAZINE HYDROCHLORIDE PRN MG: 25 INJECTION INTRAMUSCULAR; INTRAVENOUS at 22:32

## 2018-09-19 RX ADMIN — PANTOPRAZOLE SODIUM SCH MG: 40 TABLET, DELAYED RELEASE ORAL at 20:07

## 2018-09-19 RX ADMIN — ERTAPENEM SODIUM SCH MLS/HR: 1 INJECTION, POWDER, LYOPHILIZED, FOR SOLUTION INTRAMUSCULAR; INTRAVENOUS at 14:11

## 2018-09-19 RX ADMIN — OXYCODONE HYDROCHLORIDE PRN MG: 5 TABLET ORAL at 09:47

## 2018-09-19 RX ADMIN — SODIUM CHLORIDE, PRESERVATIVE FREE SCH ML: 5 INJECTION INTRAVENOUS at 09:41

## 2018-09-19 RX ADMIN — CHOLECALCIFEROL TAB 125 MCG (5000 UNIT) SCH UNIT: 125 TAB at 09:42

## 2018-09-19 RX ADMIN — OXYCODONE HYDROCHLORIDE PRN MG: 5 TABLET ORAL at 14:09

## 2018-09-19 RX ADMIN — MAGNESIUM OXIDE SCH MG: 400 TABLET ORAL at 09:47

## 2018-09-19 RX ADMIN — SODIUM CHLORIDE, PRESERVATIVE FREE SCH ML: 5 INJECTION INTRAVENOUS at 20:07

## 2018-09-19 RX ADMIN — ALLOPURINOL SCH MG: 300 TABLET ORAL at 09:46

## 2018-09-19 RX ADMIN — HYDROMORPHONE HYDROCHLORIDE PRN MG: 2 INJECTION INTRAMUSCULAR; INTRAVENOUS; SUBCUTANEOUS at 17:06

## 2018-09-19 RX ADMIN — LACTULOSE SCH GM: 10 SOLUTION ORAL at 20:06

## 2018-09-19 RX ADMIN — B-COMPLEX W/ C & FOLIC ACID TAB SCH TAB: TAB at 09:42

## 2018-09-19 RX ADMIN — SODIUM CHLORIDE SCH MLS/HR: 0.9 INJECTION, SOLUTION INTRAVENOUS at 23:57

## 2018-09-19 RX ADMIN — LACTULOSE SCH GM: 10 SOLUTION ORAL at 09:41

## 2018-09-19 RX ADMIN — PANTOPRAZOLE SODIUM SCH MG: 40 TABLET, DELAYED RELEASE ORAL at 09:47

## 2018-09-19 RX ADMIN — DAPTOMYCIN SCH MLS/HR: 500 INJECTION, POWDER, LYOPHILIZED, FOR SOLUTION INTRAVENOUS at 16:21

## 2018-09-19 RX ADMIN — ONDANSETRON PRN MG: 2 INJECTION, SOLUTION INTRAMUSCULAR; INTRAVENOUS at 20:06

## 2018-09-19 RX ADMIN — OXYCODONE HYDROCHLORIDE PRN MG: 5 TABLET ORAL at 04:09

## 2018-09-19 RX ADMIN — HYDROMORPHONE HYDROCHLORIDE PRN MG: 2 INJECTION INTRAMUSCULAR; INTRAVENOUS; SUBCUTANEOUS at 12:38

## 2018-09-19 RX ADMIN — HEPARIN SODIUM SCH UNITS: 5000 INJECTION, SOLUTION INTRAVENOUS; SUBCUTANEOUS at 09:00

## 2018-09-19 RX ADMIN — ONDANSETRON PRN MG: 4 TABLET, ORALLY DISINTEGRATING ORAL at 12:38

## 2018-09-19 RX ADMIN — HEPARIN SODIUM SCH UNITS: 5000 INJECTION, SOLUTION INTRAVENOUS; SUBCUTANEOUS at 20:07

## 2018-09-19 RX ADMIN — SODIUM CHLORIDE SCH MLS/HR: 0.9 INJECTION, SOLUTION INTRAVENOUS at 04:08

## 2018-09-20 VITALS — SYSTOLIC BLOOD PRESSURE: 107 MMHG | DIASTOLIC BLOOD PRESSURE: 56 MMHG

## 2018-09-20 VITALS — DIASTOLIC BLOOD PRESSURE: 68 MMHG | SYSTOLIC BLOOD PRESSURE: 118 MMHG

## 2018-09-20 VITALS — SYSTOLIC BLOOD PRESSURE: 107 MMHG | DIASTOLIC BLOOD PRESSURE: 67 MMHG

## 2018-09-20 VITALS — SYSTOLIC BLOOD PRESSURE: 100 MMHG | DIASTOLIC BLOOD PRESSURE: 63 MMHG

## 2018-09-20 LAB
<PLATELET ESTIMATE>: ADEQUATE
<PLT MORPHOLOGY>: (no result)
ALBUMIN SERPL-MCNC: 2 G/DL (ref 3.4–5)
ALP SERPL-CCNC: 106 U/L (ref 45–117)
ALT SERPL-CCNC: 13 U/L (ref 12–78)
ANION GAP SERPL CALC-SCNC: 8 MMOL/L (ref 5–15)
ANISOCYTOSIS BLD QL SMEAR: (no result)
BAND#(MANUAL): 0.05 X10^3/UL
BILIRUB SERPL-MCNC: 0.3 MG/DL (ref 0.2–1)
CALCIUM SERPL-MCNC: 11 MG/DL (ref 8.5–10.1)
CHLORIDE SERPL-SCNC: 106 MMOL/L (ref 98–107)
CREAT SERPL-MCNC: 0.67 MG/DL (ref 0.55–1.02)
ERYTHROCYTE [DISTWIDTH] IN BLOOD BY AUTOMATED COUNT: 15.6 % (ref 9.6–15.2)
LYMPH#(MANUAL): 0.05 X10^3/UL (ref 1–3.4)
LYMPHS% (MANUAL): 1 % (ref 22–44)
MCH RBC QN AUTO: 33.5 PG (ref 27–34.8)
MCHC RBC AUTO-ENTMCNC: 35.1 G/DL (ref 32.4–35.8)
MCV RBC AUTO: 95.5 FL (ref 80–100)
MD: YES
MONOS#(MANUAL): 0.05 X10^3/UL (ref 0.3–2.7)
MONOS% (MANUAL): 1 % (ref 2–9)
NEUTS BAND NFR BLD: 1 % (ref 0–7)
OVALOCYTES BLD QL SMEAR: (no result)
PLATELET # BLD AUTO: 220 X10^3/UL (ref 130–400)
PMV BLD AUTO: 7.4 FL (ref 7.4–10.4)
PROT SERPL-MCNC: 4.8 G/DL (ref 6.4–8.2)
RBC # BLD AUTO: 2.91 X10^6/UL (ref 3.82–5.3)
SEG#(MANUAL): 4.56 X10^3/UL (ref 1.8–6.8)
SEGS% (MANUAL): 97 % (ref 42–75)

## 2018-09-20 RX ADMIN — HEPARIN SODIUM SCH UNITS: 5000 INJECTION, SOLUTION INTRAVENOUS; SUBCUTANEOUS at 22:23

## 2018-09-20 RX ADMIN — SODIUM CHLORIDE, PRESERVATIVE FREE SCH ML: 5 INJECTION INTRAVENOUS at 08:25

## 2018-09-20 RX ADMIN — OXYCODONE HYDROCHLORIDE PRN MG: 5 TABLET ORAL at 12:45

## 2018-09-20 RX ADMIN — HEPARIN SODIUM SCH UNITS: 5000 INJECTION, SOLUTION INTRAVENOUS; SUBCUTANEOUS at 08:26

## 2018-09-20 RX ADMIN — ACYCLOVIR SCH MG: 800 TABLET ORAL at 22:17

## 2018-09-20 RX ADMIN — OXYCODONE HYDROCHLORIDE PRN MG: 5 TABLET ORAL at 04:31

## 2018-09-20 RX ADMIN — ACYCLOVIR SCH MG: 800 TABLET ORAL at 12:30

## 2018-09-20 RX ADMIN — PANTOPRAZOLE SODIUM SCH MG: 40 TABLET, DELAYED RELEASE ORAL at 08:27

## 2018-09-20 RX ADMIN — LACTULOSE SCH GM: 10 SOLUTION ORAL at 08:25

## 2018-09-20 RX ADMIN — LACTULOSE SCH GM: 10 SOLUTION ORAL at 22:17

## 2018-09-20 RX ADMIN — MAGNESIUM OXIDE SCH MG: 400 TABLET ORAL at 08:26

## 2018-09-20 RX ADMIN — B-COMPLEX W/ C & FOLIC ACID TAB SCH TAB: TAB at 08:26

## 2018-09-20 RX ADMIN — ACYCLOVIR SCH MG: 800 TABLET ORAL at 18:05

## 2018-09-20 RX ADMIN — ONDANSETRON PRN MG: 4 TABLET, ORALLY DISINTEGRATING ORAL at 20:27

## 2018-09-20 RX ADMIN — DAPTOMYCIN SCH MLS/HR: 500 INJECTION, POWDER, LYOPHILIZED, FOR SOLUTION INTRAVENOUS at 14:23

## 2018-09-20 RX ADMIN — ONDANSETRON PRN MG: 4 TABLET, ORALLY DISINTEGRATING ORAL at 12:45

## 2018-09-20 RX ADMIN — PANTOPRAZOLE SODIUM SCH MG: 40 TABLET, DELAYED RELEASE ORAL at 22:17

## 2018-09-20 RX ADMIN — OXYCODONE HYDROCHLORIDE PRN MG: 5 TABLET ORAL at 20:27

## 2018-09-20 RX ADMIN — CHOLECALCIFEROL TAB 125 MCG (5000 UNIT) SCH UNIT: 125 TAB at 08:26

## 2018-09-20 RX ADMIN — ONDANSETRON PRN MG: 2 INJECTION, SOLUTION INTRAMUSCULAR; INTRAVENOUS at 04:31

## 2018-09-20 RX ADMIN — ONDANSETRON PRN MG: 4 TABLET, ORALLY DISINTEGRATING ORAL at 08:27

## 2018-09-20 RX ADMIN — SODIUM CHLORIDE SCH MLS/HR: 0.9 INJECTION, SOLUTION INTRAVENOUS at 14:26

## 2018-09-20 RX ADMIN — OXYCODONE HYDROCHLORIDE PRN MG: 5 TABLET ORAL at 08:26

## 2018-09-20 RX ADMIN — ERTAPENEM SODIUM SCH MLS/HR: 1 INJECTION, POWDER, LYOPHILIZED, FOR SOLUTION INTRAMUSCULAR; INTRAVENOUS at 12:45

## 2018-09-20 RX ADMIN — ALLOPURINOL SCH MG: 300 TABLET ORAL at 08:26

## 2018-09-20 RX ADMIN — SODIUM CHLORIDE, PRESERVATIVE FREE SCH ML: 5 INJECTION INTRAVENOUS at 22:19

## 2018-09-21 VITALS — DIASTOLIC BLOOD PRESSURE: 66 MMHG | SYSTOLIC BLOOD PRESSURE: 118 MMHG

## 2018-09-21 VITALS — SYSTOLIC BLOOD PRESSURE: 111 MMHG | DIASTOLIC BLOOD PRESSURE: 69 MMHG

## 2018-09-21 VITALS — DIASTOLIC BLOOD PRESSURE: 65 MMHG | SYSTOLIC BLOOD PRESSURE: 96 MMHG

## 2018-09-21 VITALS — DIASTOLIC BLOOD PRESSURE: 72 MMHG | SYSTOLIC BLOOD PRESSURE: 111 MMHG

## 2018-09-21 LAB
<PLATELET ESTIMATE>: ADEQUATE
<PLT MORPHOLOGY>: (no result)
ALBUMIN SERPL-MCNC: 1.9 G/DL (ref 3.4–5)
ALP SERPL-CCNC: 92 U/L (ref 45–117)
ALT SERPL-CCNC: 17 U/L (ref 12–78)
ANION GAP SERPL CALC-SCNC: 8 MMOL/L (ref 5–15)
ANISOCYTOSIS BLD QL SMEAR: (no result)
BASOPHILS # BLD AUTO: 0 X10^3/UL (ref 0–0.1)
BASOPHILS NFR BLD AUTO: 0 % (ref 0–1)
BILIRUB SERPL-MCNC: 0.2 MG/DL (ref 0.2–1)
CALCIUM SERPL-MCNC: 10.8 MG/DL (ref 8.5–10.1)
CHLORIDE SERPL-SCNC: 104 MMOL/L (ref 98–107)
CREAT SERPL-MCNC: 0.65 MG/DL (ref 0.55–1.02)
EOSINOPHIL # BLD AUTO: 0 X10^3/UL (ref 0–0.4)
EOSINOPHIL NFR BLD AUTO: 0 % (ref 1–7)
ERYTHROCYTE [DISTWIDTH] IN BLOOD BY AUTOMATED COUNT: 16.3 % (ref 9.6–15.2)
LYMPH#(MANUAL): 0.07 X10^3/UL (ref 1–3.4)
LYMPHOCYTES # BLD AUTO: 0.11 X10^3/UL (ref 1–3.4)
LYMPHOCYTES NFR BLD AUTO: 4 % (ref 22–44)
LYMPHS% (MANUAL): 2 % (ref 22–44)
MCH RBC QN AUTO: 34.3 PG (ref 27–34.8)
MCHC RBC AUTO-ENTMCNC: 35.2 G/DL (ref 32.4–35.8)
MCV RBC AUTO: 97.3 FL (ref 80–100)
MD: YES
MONOCYTES # BLD AUTO: 0.02 X10^3/UL (ref 0.2–0.8)
MONOCYTES NFR BLD AUTO: 1 % (ref 2–9)
NEUTROPHILS # BLD AUTO: 3.16 X10^3/UL (ref 1.8–6.8)
NEUTROPHILS NFR BLD AUTO: 96 % (ref 42–75)
OVALOCYTES BLD QL SMEAR: (no result)
PLATELET # BLD AUTO: 181 X10^3/UL (ref 130–400)
PMV BLD AUTO: 7.4 FL (ref 7.4–10.4)
PROT SERPL-MCNC: 4.6 G/DL (ref 6.4–8.2)
RBC # BLD AUTO: 2.53 X10^6/UL (ref 3.82–5.3)
SEG#(MANUAL): 3.23 X10^3/UL (ref 1.8–6.8)
SEGS% (MANUAL): 98 % (ref 42–75)

## 2018-09-21 RX ADMIN — POLYETHYLENE GLYCOL 3350 PRN GM: 17 POWDER, FOR SOLUTION ORAL at 13:33

## 2018-09-21 RX ADMIN — OXYCODONE HYDROCHLORIDE PRN MG: 5 TABLET ORAL at 01:21

## 2018-09-21 RX ADMIN — LACTULOSE SCH GM: 10 SOLUTION ORAL at 08:54

## 2018-09-21 RX ADMIN — SODIUM CHLORIDE SCH MLS/HR: 0.9 INJECTION, SOLUTION INTRAVENOUS at 17:05

## 2018-09-21 RX ADMIN — ALLOPURINOL SCH MG: 300 TABLET ORAL at 08:55

## 2018-09-21 RX ADMIN — DAPTOMYCIN SCH MLS/HR: 500 INJECTION, POWDER, LYOPHILIZED, FOR SOLUTION INTRAVENOUS at 14:15

## 2018-09-21 RX ADMIN — OXYCODONE HYDROCHLORIDE PRN MG: 5 TABLET ORAL at 22:49

## 2018-09-21 RX ADMIN — OXYCODONE HYDROCHLORIDE PRN MG: 5 TABLET ORAL at 05:16

## 2018-09-21 RX ADMIN — ACYCLOVIR SCH MG: 800 TABLET ORAL at 08:55

## 2018-09-21 RX ADMIN — CHOLECALCIFEROL TAB 125 MCG (5000 UNIT) SCH UNIT: 125 TAB at 08:55

## 2018-09-21 RX ADMIN — PANTOPRAZOLE SODIUM SCH MG: 40 TABLET, DELAYED RELEASE ORAL at 20:32

## 2018-09-21 RX ADMIN — SODIUM CHLORIDE, PRESERVATIVE FREE SCH ML: 5 INJECTION INTRAVENOUS at 09:02

## 2018-09-21 RX ADMIN — OXYCODONE HYDROCHLORIDE PRN MG: 5 TABLET ORAL at 09:29

## 2018-09-21 RX ADMIN — PROMETHAZINE HYDROCHLORIDE PRN MG: 25 INJECTION INTRAMUSCULAR; INTRAVENOUS at 03:07

## 2018-09-21 RX ADMIN — SODIUM CHLORIDE SCH MLS/HR: 0.9 INJECTION, SOLUTION INTRAVENOUS at 02:54

## 2018-09-21 RX ADMIN — ONDANSETRON PRN MG: 2 INJECTION, SOLUTION INTRAMUSCULAR; INTRAVENOUS at 09:02

## 2018-09-21 RX ADMIN — HEPARIN SODIUM SCH UNITS: 5000 INJECTION, SOLUTION INTRAVENOUS; SUBCUTANEOUS at 08:56

## 2018-09-21 RX ADMIN — SODIUM CHLORIDE, PRESERVATIVE FREE SCH ML: 5 INJECTION INTRAVENOUS at 20:31

## 2018-09-21 RX ADMIN — ONDANSETRON PRN MG: 4 TABLET, ORALLY DISINTEGRATING ORAL at 05:16

## 2018-09-21 RX ADMIN — LACTULOSE SCH GM: 10 SOLUTION ORAL at 20:33

## 2018-09-21 RX ADMIN — B-COMPLEX W/ C & FOLIC ACID TAB SCH TAB: TAB at 09:02

## 2018-09-21 RX ADMIN — MAGNESIUM OXIDE SCH MG: 400 TABLET ORAL at 08:55

## 2018-09-21 RX ADMIN — ACYCLOVIR SCH MG: 800 TABLET ORAL at 17:05

## 2018-09-21 RX ADMIN — ACYCLOVIR SCH MG: 800 TABLET ORAL at 20:32

## 2018-09-21 RX ADMIN — ERTAPENEM SODIUM SCH MLS/HR: 1 INJECTION, POWDER, LYOPHILIZED, FOR SOLUTION INTRAMUSCULAR; INTRAVENOUS at 13:33

## 2018-09-21 RX ADMIN — PANTOPRAZOLE SODIUM SCH MG: 40 TABLET, DELAYED RELEASE ORAL at 08:55

## 2018-09-21 RX ADMIN — ONDANSETRON PRN MG: 4 TABLET, ORALLY DISINTEGRATING ORAL at 01:21

## 2018-09-21 RX ADMIN — HEPARIN SODIUM SCH UNITS: 5000 INJECTION, SOLUTION INTRAVENOUS; SUBCUTANEOUS at 20:32

## 2018-09-22 VITALS — SYSTOLIC BLOOD PRESSURE: 109 MMHG | DIASTOLIC BLOOD PRESSURE: 59 MMHG

## 2018-09-22 VITALS — SYSTOLIC BLOOD PRESSURE: 103 MMHG | DIASTOLIC BLOOD PRESSURE: 63 MMHG

## 2018-09-22 VITALS — DIASTOLIC BLOOD PRESSURE: 66 MMHG | SYSTOLIC BLOOD PRESSURE: 102 MMHG

## 2018-09-22 VITALS — SYSTOLIC BLOOD PRESSURE: 112 MMHG | DIASTOLIC BLOOD PRESSURE: 72 MMHG

## 2018-09-22 LAB
ALBUMIN SERPL-MCNC: 2 G/DL (ref 3.4–5)
ALP SERPL-CCNC: 86 U/L (ref 45–117)
ALT SERPL-CCNC: 21 U/L (ref 12–78)
ANION GAP SERPL CALC-SCNC: 7 MMOL/L (ref 5–15)
BASOPHILS # BLD AUTO: 0 X10^3/UL (ref 0–0.1)
BASOPHILS NFR BLD AUTO: 0 % (ref 0–1)
BILIRUB SERPL-MCNC: 0.4 MG/DL (ref 0.2–1)
CALCIUM SERPL-MCNC: 10.3 MG/DL (ref 8.5–10.1)
CHLORIDE SERPL-SCNC: 107 MMOL/L (ref 98–107)
CREAT SERPL-MCNC: 0.59 MG/DL (ref 0.55–1.02)
EOSINOPHIL # BLD AUTO: 0 X10^3/UL (ref 0–0.4)
EOSINOPHIL NFR BLD AUTO: 0 % (ref 1–7)
ERYTHROCYTE [DISTWIDTH] IN BLOOD BY AUTOMATED COUNT: 15.6 % (ref 9.6–15.2)
LYMPHOCYTES # BLD AUTO: 0.11 X10^3/UL (ref 1–3.4)
LYMPHOCYTES NFR BLD AUTO: 4 % (ref 22–44)
MCH RBC QN AUTO: 33.2 PG (ref 27–34.8)
MCHC RBC AUTO-ENTMCNC: 34.2 G/DL (ref 32.4–35.8)
MCV RBC AUTO: 97.1 FL (ref 80–100)
MD: (no result)
MONOCYTES # BLD AUTO: 0 X10^3/UL (ref 0.2–0.8)
MONOCYTES NFR BLD AUTO: 0 % (ref 2–9)
NEUTROPHILS # BLD AUTO: 2.55 X10^3/UL (ref 1.8–6.8)
NEUTROPHILS NFR BLD AUTO: 96 % (ref 42–75)
PLATELET # BLD AUTO: 157 X10^3/UL (ref 130–400)
PMV BLD AUTO: 7.9 FL (ref 7.4–10.4)
PROT SERPL-MCNC: 4.6 G/DL (ref 6.4–8.2)
RBC # BLD AUTO: 2.43 X10^6/UL (ref 3.82–5.3)

## 2018-09-22 RX ADMIN — SODIUM CHLORIDE SCH MLS/HR: 0.9 INJECTION, SOLUTION INTRAVENOUS at 05:15

## 2018-09-22 RX ADMIN — PANTOPRAZOLE SODIUM SCH MG: 40 TABLET, DELAYED RELEASE ORAL at 08:25

## 2018-09-22 RX ADMIN — SODIUM CHLORIDE, PRESERVATIVE FREE SCH ML: 5 INJECTION INTRAVENOUS at 09:00

## 2018-09-22 RX ADMIN — ERTAPENEM SODIUM SCH MLS/HR: 1 INJECTION, POWDER, LYOPHILIZED, FOR SOLUTION INTRAMUSCULAR; INTRAVENOUS at 13:36

## 2018-09-22 RX ADMIN — SODIUM CHLORIDE, PRESERVATIVE FREE SCH ML: 5 INJECTION INTRAVENOUS at 21:20

## 2018-09-22 RX ADMIN — B-COMPLEX W/ C & FOLIC ACID TAB SCH TAB: TAB at 08:24

## 2018-09-22 RX ADMIN — ACYCLOVIR SCH MG: 800 TABLET ORAL at 08:24

## 2018-09-22 RX ADMIN — HEPARIN SODIUM SCH UNITS: 5000 INJECTION, SOLUTION INTRAVENOUS; SUBCUTANEOUS at 08:24

## 2018-09-22 RX ADMIN — ALLOPURINOL SCH MG: 300 TABLET ORAL at 08:24

## 2018-09-22 RX ADMIN — LACTULOSE SCH GM: 10 SOLUTION ORAL at 08:25

## 2018-09-22 RX ADMIN — CHOLECALCIFEROL TAB 125 MCG (5000 UNIT) SCH UNIT: 125 TAB at 08:24

## 2018-09-22 RX ADMIN — LACTULOSE SCH GM: 10 SOLUTION ORAL at 21:20

## 2018-09-22 RX ADMIN — HEPARIN SODIUM SCH UNITS: 5000 INJECTION, SOLUTION INTRAVENOUS; SUBCUTANEOUS at 21:21

## 2018-09-22 RX ADMIN — OXYCODONE HYDROCHLORIDE PRN MG: 5 TABLET ORAL at 08:53

## 2018-09-22 RX ADMIN — ACYCLOVIR SCH MG: 800 TABLET ORAL at 21:21

## 2018-09-22 RX ADMIN — OXYCODONE HYDROCHLORIDE PRN MG: 5 TABLET ORAL at 19:52

## 2018-09-22 RX ADMIN — PANTOPRAZOLE SODIUM SCH MG: 40 TABLET, DELAYED RELEASE ORAL at 21:21

## 2018-09-22 RX ADMIN — SODIUM CHLORIDE SCH MLS/HR: 0.9 INJECTION, SOLUTION INTRAVENOUS at 18:10

## 2018-09-22 RX ADMIN — DAPTOMYCIN SCH MLS/HR: 500 INJECTION, POWDER, LYOPHILIZED, FOR SOLUTION INTRAVENOUS at 14:18

## 2018-09-22 RX ADMIN — METHOCARBAMOL PRN MG: 750 TABLET ORAL at 21:21

## 2018-09-22 RX ADMIN — MAGNESIUM OXIDE SCH MG: 400 TABLET ORAL at 08:24

## 2018-09-22 RX ADMIN — ACYCLOVIR SCH MG: 800 TABLET ORAL at 15:55

## 2018-09-22 RX ADMIN — ONDANSETRON PRN MG: 4 TABLET, ORALLY DISINTEGRATING ORAL at 19:52

## 2018-09-23 VITALS — DIASTOLIC BLOOD PRESSURE: 77 MMHG | SYSTOLIC BLOOD PRESSURE: 125 MMHG

## 2018-09-23 VITALS — SYSTOLIC BLOOD PRESSURE: 116 MMHG | DIASTOLIC BLOOD PRESSURE: 73 MMHG

## 2018-09-23 VITALS — DIASTOLIC BLOOD PRESSURE: 81 MMHG | SYSTOLIC BLOOD PRESSURE: 128 MMHG

## 2018-09-23 VITALS — DIASTOLIC BLOOD PRESSURE: 67 MMHG | SYSTOLIC BLOOD PRESSURE: 112 MMHG

## 2018-09-23 LAB
ALBUMIN SERPL-MCNC: 2 G/DL (ref 3.4–5)
ALP SERPL-CCNC: 81 U/L (ref 45–117)
ALT SERPL-CCNC: 24 U/L (ref 12–78)
ANION GAP SERPL CALC-SCNC: 7 MMOL/L (ref 5–15)
BASOPHILS # BLD AUTO: 0.01 X10^3/UL (ref 0–0.1)
BASOPHILS NFR BLD AUTO: 0 % (ref 0–1)
BILIRUB SERPL-MCNC: 0.4 MG/DL (ref 0.2–1)
CALCIUM SERPL-MCNC: 10 MG/DL (ref 8.5–10.1)
CHLORIDE SERPL-SCNC: 107 MMOL/L (ref 98–107)
CREAT SERPL-MCNC: 0.61 MG/DL (ref 0.55–1.02)
EOSINOPHIL # BLD AUTO: 0 X10^3/UL (ref 0–0.4)
EOSINOPHIL NFR BLD AUTO: 0 % (ref 1–7)
ERYTHROCYTE [DISTWIDTH] IN BLOOD BY AUTOMATED COUNT: 15.4 % (ref 9.6–15.2)
LYMPHOCYTES # BLD AUTO: 0.11 X10^3/UL (ref 1–3.4)
LYMPHOCYTES NFR BLD AUTO: 5 % (ref 22–44)
MCH RBC QN AUTO: 34.2 PG (ref 27–34.8)
MCHC RBC AUTO-ENTMCNC: 35.3 G/DL (ref 32.4–35.8)
MCV RBC AUTO: 96.9 FL (ref 80–100)
MD: (no result)
MONOCYTES # BLD AUTO: 0 X10^3/UL (ref 0.2–0.8)
MONOCYTES NFR BLD AUTO: 0 % (ref 2–9)
NEUTROPHILS # BLD AUTO: 2.25 X10^3/UL (ref 1.8–6.8)
NEUTROPHILS NFR BLD AUTO: 95 % (ref 42–75)
PLATELET # BLD AUTO: 139 X10^3/UL (ref 130–400)
PMV BLD AUTO: 7.5 FL (ref 7.4–10.4)
PROT SERPL-MCNC: 4.6 G/DL (ref 6.4–8.2)
RBC # BLD AUTO: 2.34 X10^6/UL (ref 3.82–5.3)

## 2018-09-23 RX ADMIN — PANTOPRAZOLE SODIUM SCH MG: 40 TABLET, DELAYED RELEASE ORAL at 08:35

## 2018-09-23 RX ADMIN — SODIUM CHLORIDE, PRESERVATIVE FREE SCH ML: 5 INJECTION INTRAVENOUS at 08:37

## 2018-09-23 RX ADMIN — CHOLECALCIFEROL TAB 125 MCG (5000 UNIT) SCH UNIT: 125 TAB at 08:34

## 2018-09-23 RX ADMIN — OXYCODONE HYDROCHLORIDE PRN MG: 5 TABLET ORAL at 12:44

## 2018-09-23 RX ADMIN — DAPTOMYCIN SCH MLS/HR: 500 INJECTION, POWDER, LYOPHILIZED, FOR SOLUTION INTRAVENOUS at 15:29

## 2018-09-23 RX ADMIN — MAGNESIUM OXIDE SCH MG: 400 TABLET ORAL at 08:36

## 2018-09-23 RX ADMIN — LACTULOSE SCH GM: 10 SOLUTION ORAL at 20:27

## 2018-09-23 RX ADMIN — LACTULOSE SCH GM: 10 SOLUTION ORAL at 08:37

## 2018-09-23 RX ADMIN — ACYCLOVIR SCH MG: 800 TABLET ORAL at 08:34

## 2018-09-23 RX ADMIN — SODIUM CHLORIDE SCH MLS/HR: 0.9 INJECTION, SOLUTION INTRAVENOUS at 20:27

## 2018-09-23 RX ADMIN — ACYCLOVIR SCH MG: 800 TABLET ORAL at 20:27

## 2018-09-23 RX ADMIN — OXYCODONE HYDROCHLORIDE PRN MG: 5 TABLET ORAL at 08:36

## 2018-09-23 RX ADMIN — ACYCLOVIR SCH MG: 800 TABLET ORAL at 17:02

## 2018-09-23 RX ADMIN — MICONAZOLE NITRATE SCH APPLIC: 20 CREAM VAGINAL at 20:28

## 2018-09-23 RX ADMIN — METHOCARBAMOL PRN MG: 750 TABLET ORAL at 08:36

## 2018-09-23 RX ADMIN — ONDANSETRON PRN MG: 4 TABLET, ORALLY DISINTEGRATING ORAL at 12:43

## 2018-09-23 RX ADMIN — HEPARIN SODIUM SCH UNITS: 5000 INJECTION, SOLUTION INTRAVENOUS; SUBCUTANEOUS at 08:34

## 2018-09-23 RX ADMIN — ERTAPENEM SODIUM SCH MLS/HR: 1 INJECTION, POWDER, LYOPHILIZED, FOR SOLUTION INTRAMUSCULAR; INTRAVENOUS at 13:39

## 2018-09-23 RX ADMIN — METHOCARBAMOL PRN MG: 750 TABLET ORAL at 17:02

## 2018-09-23 RX ADMIN — B-COMPLEX W/ C & FOLIC ACID TAB SCH TAB: TAB at 08:34

## 2018-09-23 RX ADMIN — PANTOPRAZOLE SODIUM SCH MG: 40 TABLET, DELAYED RELEASE ORAL at 20:28

## 2018-09-23 RX ADMIN — SODIUM CHLORIDE SCH MLS/HR: 0.9 INJECTION, SOLUTION INTRAVENOUS at 06:41

## 2018-09-23 RX ADMIN — SODIUM CHLORIDE, PRESERVATIVE FREE SCH ML: 5 INJECTION INTRAVENOUS at 20:29

## 2018-09-23 RX ADMIN — HEPARIN SODIUM SCH UNITS: 5000 INJECTION, SOLUTION INTRAVENOUS; SUBCUTANEOUS at 20:28

## 2018-09-24 VITALS — SYSTOLIC BLOOD PRESSURE: 97 MMHG | DIASTOLIC BLOOD PRESSURE: 62 MMHG

## 2018-09-24 VITALS — DIASTOLIC BLOOD PRESSURE: 66 MMHG | SYSTOLIC BLOOD PRESSURE: 114 MMHG

## 2018-09-24 VITALS — DIASTOLIC BLOOD PRESSURE: 66 MMHG | SYSTOLIC BLOOD PRESSURE: 110 MMHG

## 2018-09-24 VITALS — SYSTOLIC BLOOD PRESSURE: 102 MMHG | DIASTOLIC BLOOD PRESSURE: 66 MMHG

## 2018-09-24 LAB
<PLATELET ESTIMATE>: (no result)
<PLT MORPHOLOGY>: (no result)
ALBUMIN SERPL-MCNC: 1.9 G/DL (ref 3.4–5)
ALP SERPL-CCNC: 74 U/L (ref 45–117)
ALT SERPL-CCNC: 22 U/L (ref 12–78)
ANION GAP SERPL CALC-SCNC: 7 MMOL/L (ref 5–15)
ANISOCYTOSIS BLD QL SMEAR: (no result)
BAND#(MANUAL): 0.03 X10^3/UL
BILIRUB SERPL-MCNC: 0.3 MG/DL (ref 0.2–1)
CALCIUM SERPL-MCNC: 9.4 MG/DL (ref 8.5–10.1)
CHLORIDE SERPL-SCNC: 109 MMOL/L (ref 98–107)
CREAT SERPL-MCNC: 0.55 MG/DL (ref 0.55–1.02)
CRP SERPL-MCNC: 1.7 MG/DL (ref 0.02–0.49)
EOS#(MANUAL): 0.01 X10^3/UL (ref 0–0.4)
EOS% (MANUAL): 1 % (ref 1–7)
ERYTHROCYTE [DISTWIDTH] IN BLOOD BY AUTOMATED COUNT: 15.5 % (ref 9.6–15.2)
ERYTHROCYTE [SEDIMENTATION RATE] IN BLOOD BY WESTERGREN METHOD: 49 MM/HR (ref 0–20)
HCT (SEDRATE): 20.9 % (ref 34.6–47.8)
LYMPH#(MANUAL): 0.11 X10^3/UL (ref 1–3.4)
LYMPHS% (MANUAL): 10 % (ref 22–44)
MCH RBC QN AUTO: 33.3 PG (ref 27–34.8)
MCHC RBC AUTO-ENTMCNC: 34.5 G/DL (ref 32.4–35.8)
MCV RBC AUTO: 96.4 FL (ref 80–100)
MD: YES
NEUTS BAND NFR BLD: 3 % (ref 0–7)
PLATELET # BLD AUTO: 114 X10^3/UL (ref 130–400)
PMV BLD AUTO: 7.2 FL (ref 7.4–10.4)
PROT SERPL-MCNC: 4.2 G/DL (ref 6.4–8.2)
RBC # BLD AUTO: 2.17 X10^6/UL (ref 3.82–5.3)
SEG#(MANUAL): 0.95 X10^3/UL (ref 1.8–6.8)
SEGS% (MANUAL): 86 % (ref 42–75)

## 2018-09-24 RX ADMIN — LACTULOSE SCH GM: 10 SOLUTION ORAL at 09:00

## 2018-09-24 RX ADMIN — CHOLECALCIFEROL TAB 125 MCG (5000 UNIT) SCH UNIT: 125 TAB at 09:23

## 2018-09-24 RX ADMIN — DOCUSATE SODIUM PRN MG: 100 CAPSULE, LIQUID FILLED ORAL at 09:22

## 2018-09-24 RX ADMIN — OXYCODONE HYDROCHLORIDE PRN MG: 5 TABLET ORAL at 16:37

## 2018-09-24 RX ADMIN — METHOCARBAMOL PRN MG: 750 TABLET ORAL at 16:37

## 2018-09-24 RX ADMIN — ACYCLOVIR SCH MG: 800 TABLET ORAL at 09:23

## 2018-09-24 RX ADMIN — MICONAZOLE NITRATE SCH APPLIC: 20 CREAM VAGINAL at 21:20

## 2018-09-24 RX ADMIN — SODIUM CHLORIDE, PRESERVATIVE FREE SCH ML: 5 INJECTION INTRAVENOUS at 09:25

## 2018-09-24 RX ADMIN — PANTOPRAZOLE SODIUM SCH MG: 40 TABLET, DELAYED RELEASE ORAL at 21:18

## 2018-09-24 RX ADMIN — HEPARIN SODIUM SCH UNITS: 5000 INJECTION, SOLUTION INTRAVENOUS; SUBCUTANEOUS at 09:25

## 2018-09-24 RX ADMIN — FLUCONAZOLE SCH MG: 200 TABLET ORAL at 09:23

## 2018-09-24 RX ADMIN — SODIUM CHLORIDE SCH MLS/HR: 0.9 INJECTION, SOLUTION INTRAVENOUS at 15:28

## 2018-09-24 RX ADMIN — DAPTOMYCIN SCH MLS/HR: 500 INJECTION, POWDER, LYOPHILIZED, FOR SOLUTION INTRAVENOUS at 16:37

## 2018-09-24 RX ADMIN — OXYCODONE HYDROCHLORIDE PRN MG: 5 TABLET ORAL at 10:27

## 2018-09-24 RX ADMIN — ACYCLOVIR SCH MG: 800 TABLET ORAL at 21:19

## 2018-09-24 RX ADMIN — OXYCODONE HYDROCHLORIDE PRN MG: 5 TABLET ORAL at 06:24

## 2018-09-24 RX ADMIN — HEPARIN SODIUM SCH UNITS: 5000 INJECTION, SOLUTION INTRAVENOUS; SUBCUTANEOUS at 21:18

## 2018-09-24 RX ADMIN — PANTOPRAZOLE SODIUM SCH MG: 40 TABLET, DELAYED RELEASE ORAL at 09:23

## 2018-09-24 RX ADMIN — METHOCARBAMOL PRN MG: 750 TABLET ORAL at 09:23

## 2018-09-24 RX ADMIN — MAGNESIUM OXIDE SCH MG: 400 TABLET ORAL at 09:24

## 2018-09-24 RX ADMIN — ERTAPENEM SODIUM SCH MLS/HR: 1 INJECTION, POWDER, LYOPHILIZED, FOR SOLUTION INTRAMUSCULAR; INTRAVENOUS at 15:27

## 2018-09-24 RX ADMIN — TBO-FILGRASTIM SCH MCG: 480 INJECTION, SOLUTION SUBCUTANEOUS at 10:28

## 2018-09-24 RX ADMIN — ACYCLOVIR SCH MG: 800 TABLET ORAL at 15:27

## 2018-09-24 RX ADMIN — LACTULOSE SCH GM: 10 SOLUTION ORAL at 21:00

## 2018-09-24 RX ADMIN — SODIUM CHLORIDE, PRESERVATIVE FREE SCH ML: 5 INJECTION INTRAVENOUS at 21:18

## 2018-09-24 RX ADMIN — B-COMPLEX W/ C & FOLIC ACID TAB SCH TAB: TAB at 09:24

## 2018-09-25 VITALS — SYSTOLIC BLOOD PRESSURE: 103 MMHG | DIASTOLIC BLOOD PRESSURE: 66 MMHG

## 2018-09-25 VITALS — DIASTOLIC BLOOD PRESSURE: 62 MMHG | SYSTOLIC BLOOD PRESSURE: 99 MMHG

## 2018-09-25 VITALS — DIASTOLIC BLOOD PRESSURE: 67 MMHG | SYSTOLIC BLOOD PRESSURE: 106 MMHG

## 2018-09-25 VITALS — DIASTOLIC BLOOD PRESSURE: 58 MMHG | SYSTOLIC BLOOD PRESSURE: 92 MMHG

## 2018-09-25 LAB
<PLATELET ESTIMATE>: (no result)
<PLT MORPHOLOGY>: (no result)
ALBUMIN SERPL-MCNC: 1.8 G/DL (ref 3.4–5)
ALP SERPL-CCNC: 71 U/L (ref 45–117)
ALT SERPL-CCNC: 19 U/L (ref 12–78)
ANION GAP SERPL CALC-SCNC: 7 MMOL/L (ref 5–15)
ANISOCYTOSIS BLD QL SMEAR: (no result)
BASOPHILS NFR BLD MANUAL: 2 % (ref 0–1)
BASOS#(MANUAL): 0.01 X10^3/UL (ref 0–0.1)
BILIRUB SERPL-MCNC: 0.3 MG/DL (ref 0.2–1)
CALCIUM SERPL-MCNC: 8.9 MG/DL (ref 8.5–10.1)
CHLORIDE SERPL-SCNC: 108 MMOL/L (ref 98–107)
CREAT SERPL-MCNC: 0.54 MG/DL (ref 0.55–1.02)
ERYTHROCYTE [DISTWIDTH] IN BLOOD BY AUTOMATED COUNT: 15.6 % (ref 9.6–15.2)
LYMPH#(MANUAL): 0.05 X10^3/UL (ref 1–3.4)
LYMPHS% (MANUAL): 12 % (ref 22–44)
MCH RBC QN AUTO: 33.3 PG (ref 27–34.8)
MCHC RBC AUTO-ENTMCNC: 34.5 G/DL (ref 32.4–35.8)
MCV RBC AUTO: 96.5 FL (ref 80–100)
MD: YES
OVALOCYTES BLD QL SMEAR: (no result)
PLATELET # BLD AUTO: 84 X10^3/UL (ref 130–400)
PMV BLD AUTO: 7.7 FL (ref 7.4–10.4)
PROT SERPL-MCNC: 4.4 G/DL (ref 6.4–8.2)
RBC # BLD AUTO: 2.21 X10^6/UL (ref 3.82–5.3)
SEG#(MANUAL): 0.34 X10^3/UL (ref 1.8–6.8)
SEGS% (MANUAL): 86 % (ref 42–75)

## 2018-09-25 RX ADMIN — FLUCONAZOLE SCH MG: 200 TABLET ORAL at 09:00

## 2018-09-25 RX ADMIN — LACTULOSE SCH GM: 10 SOLUTION ORAL at 19:49

## 2018-09-25 RX ADMIN — CHOLECALCIFEROL TAB 125 MCG (5000 UNIT) SCH UNIT: 125 TAB at 09:34

## 2018-09-25 RX ADMIN — MICONAZOLE NITRATE SCH APPLIC: 20 CREAM VAGINAL at 19:50

## 2018-09-25 RX ADMIN — TBO-FILGRASTIM SCH MCG: 480 INJECTION, SOLUTION SUBCUTANEOUS at 09:45

## 2018-09-25 RX ADMIN — MAGNESIUM OXIDE SCH MG: 400 TABLET ORAL at 09:34

## 2018-09-25 RX ADMIN — ACYCLOVIR SCH MG: 800 TABLET ORAL at 19:50

## 2018-09-25 RX ADMIN — OXYCODONE HYDROCHLORIDE PRN MG: 5 TABLET ORAL at 05:57

## 2018-09-25 RX ADMIN — PANTOPRAZOLE SODIUM SCH MG: 40 TABLET, DELAYED RELEASE ORAL at 19:50

## 2018-09-25 RX ADMIN — DOCUSATE SODIUM PRN MG: 100 CAPSULE, LIQUID FILLED ORAL at 09:34

## 2018-09-25 RX ADMIN — SODIUM CHLORIDE SCH MLS/HR: 0.9 INJECTION, SOLUTION INTRAVENOUS at 02:19

## 2018-09-25 RX ADMIN — ACYCLOVIR SCH MG: 800 TABLET ORAL at 09:45

## 2018-09-25 RX ADMIN — SODIUM CHLORIDE SCH MLS/HR: 0.9 INJECTION, SOLUTION INTRAVENOUS at 15:37

## 2018-09-25 RX ADMIN — DAPTOMYCIN SCH MLS/HR: 500 INJECTION, POWDER, LYOPHILIZED, FOR SOLUTION INTRAVENOUS at 15:35

## 2018-09-25 RX ADMIN — METHOCARBAMOL PRN MG: 750 TABLET ORAL at 09:33

## 2018-09-25 RX ADMIN — SODIUM CHLORIDE, PRESERVATIVE FREE SCH ML: 5 INJECTION INTRAVENOUS at 09:00

## 2018-09-25 RX ADMIN — PANTOPRAZOLE SODIUM SCH MG: 40 TABLET, DELAYED RELEASE ORAL at 09:34

## 2018-09-25 RX ADMIN — LACTULOSE SCH GM: 10 SOLUTION ORAL at 09:00

## 2018-09-25 RX ADMIN — SODIUM CHLORIDE, PRESERVATIVE FREE SCH ML: 5 INJECTION INTRAVENOUS at 21:17

## 2018-09-25 RX ADMIN — ERTAPENEM SODIUM SCH MLS/HR: 1 INJECTION, POWDER, LYOPHILIZED, FOR SOLUTION INTRAMUSCULAR; INTRAVENOUS at 13:41

## 2018-09-25 RX ADMIN — FLUCONAZOLE, SODIUM CHLORIDE SCH MLS/HR: 2 INJECTION INTRAVENOUS at 22:30

## 2018-09-25 RX ADMIN — HEPARIN SODIUM SCH UNITS: 5000 INJECTION, SOLUTION INTRAVENOUS; SUBCUTANEOUS at 09:35

## 2018-09-25 RX ADMIN — B-COMPLEX W/ C & FOLIC ACID TAB SCH TAB: TAB at 09:33

## 2018-09-25 RX ADMIN — HEPARIN SODIUM SCH UNITS: 5000 INJECTION, SOLUTION INTRAVENOUS; SUBCUTANEOUS at 19:50

## 2018-09-25 RX ADMIN — ACYCLOVIR SCH MG: 800 TABLET ORAL at 17:20

## 2018-09-26 VITALS — DIASTOLIC BLOOD PRESSURE: 67 MMHG | SYSTOLIC BLOOD PRESSURE: 103 MMHG

## 2018-09-26 VITALS — DIASTOLIC BLOOD PRESSURE: 61 MMHG | SYSTOLIC BLOOD PRESSURE: 105 MMHG

## 2018-09-26 VITALS — DIASTOLIC BLOOD PRESSURE: 73 MMHG | SYSTOLIC BLOOD PRESSURE: 110 MMHG

## 2018-09-26 VITALS — SYSTOLIC BLOOD PRESSURE: 101 MMHG | DIASTOLIC BLOOD PRESSURE: 65 MMHG

## 2018-09-26 VITALS — SYSTOLIC BLOOD PRESSURE: 102 MMHG | DIASTOLIC BLOOD PRESSURE: 62 MMHG

## 2018-09-26 VITALS — DIASTOLIC BLOOD PRESSURE: 80 MMHG | SYSTOLIC BLOOD PRESSURE: 120 MMHG

## 2018-09-26 VITALS — DIASTOLIC BLOOD PRESSURE: 63 MMHG | SYSTOLIC BLOOD PRESSURE: 97 MMHG

## 2018-09-26 VITALS — DIASTOLIC BLOOD PRESSURE: 64 MMHG | SYSTOLIC BLOOD PRESSURE: 108 MMHG

## 2018-09-26 VITALS — DIASTOLIC BLOOD PRESSURE: 55 MMHG | SYSTOLIC BLOOD PRESSURE: 101 MMHG

## 2018-09-26 VITALS — SYSTOLIC BLOOD PRESSURE: 103 MMHG | DIASTOLIC BLOOD PRESSURE: 67 MMHG

## 2018-09-26 VITALS — SYSTOLIC BLOOD PRESSURE: 112 MMHG | DIASTOLIC BLOOD PRESSURE: 68 MMHG

## 2018-09-26 VITALS — SYSTOLIC BLOOD PRESSURE: 96 MMHG | DIASTOLIC BLOOD PRESSURE: 54 MMHG

## 2018-09-26 LAB
<PLATELET ESTIMATE>: (no result)
<PLT MORPHOLOGY>: (no result)
ALBUMIN SERPL-MCNC: 1.9 G/DL (ref 3.4–5)
ALP SERPL-CCNC: 68 U/L (ref 45–117)
ALT SERPL-CCNC: 18 U/L (ref 12–78)
ANION GAP SERPL CALC-SCNC: 6 MMOL/L (ref 5–15)
ANISOCYTOSIS BLD QL SMEAR: (no result)
BILIRUB SERPL-MCNC: 0.3 MG/DL (ref 0.2–1)
CALCIUM SERPL-MCNC: 8.4 MG/DL (ref 8.5–10.1)
CHLORIDE SERPL-SCNC: 111 MMOL/L (ref 98–107)
CREAT SERPL-MCNC: 0.48 MG/DL (ref 0.55–1.02)
ERYTHROCYTE [DISTWIDTH] IN BLOOD BY AUTOMATED COUNT: 15.1 % (ref 9.6–15.2)
LYMPH#(MANUAL): 0.18 X10^3/UL (ref 1–3.4)
LYMPHS% (MANUAL): 92 % (ref 22–44)
MCH RBC QN AUTO: 33.7 PG (ref 27–34.8)
MCHC RBC AUTO-ENTMCNC: 35.2 G/DL (ref 32.4–35.8)
MCV RBC AUTO: 95.8 FL (ref 80–100)
MD: YES
MONOS#(MANUAL): 0.01 X10^3/UL (ref 0.3–2.7)
MONOS% (MANUAL): 4 % (ref 2–9)
OVALOCYTES BLD QL SMEAR: (no result)
PLATELET # BLD AUTO: 64 X10^3/UL (ref 130–400)
PMV BLD AUTO: 8 FL (ref 7.4–10.4)
PROT SERPL-MCNC: 4.4 G/DL (ref 6.4–8.2)
RBC # BLD AUTO: 2.01 X10^6/UL (ref 3.82–5.3)
SEG#(MANUAL): 0.01 X10^3/UL (ref 1.8–6.8)
SEGS% (MANUAL): 4 % (ref 42–75)

## 2018-09-26 RX ADMIN — CHOLECALCIFEROL TAB 125 MCG (5000 UNIT) SCH UNIT: 125 TAB at 09:28

## 2018-09-26 RX ADMIN — ACYCLOVIR SCH MG: 800 TABLET ORAL at 09:30

## 2018-09-26 RX ADMIN — LACTULOSE SCH GM: 10 SOLUTION ORAL at 09:00

## 2018-09-26 RX ADMIN — ACYCLOVIR SCH MG: 800 TABLET ORAL at 16:43

## 2018-09-26 RX ADMIN — MAGNESIUM OXIDE SCH MG: 400 TABLET ORAL at 09:27

## 2018-09-26 RX ADMIN — B-COMPLEX W/ C & FOLIC ACID TAB SCH TAB: TAB at 09:29

## 2018-09-26 RX ADMIN — HEPARIN SODIUM SCH UNITS: 5000 INJECTION, SOLUTION INTRAVENOUS; SUBCUTANEOUS at 20:13

## 2018-09-26 RX ADMIN — SODIUM CHLORIDE SCH MLS/HR: 0.9 INJECTION, SOLUTION INTRAVENOUS at 04:33

## 2018-09-26 RX ADMIN — MICONAZOLE NITRATE SCH APPLIC: 20 CREAM VAGINAL at 20:14

## 2018-09-26 RX ADMIN — ERTAPENEM SODIUM SCH MLS/HR: 1 INJECTION, POWDER, LYOPHILIZED, FOR SOLUTION INTRAMUSCULAR; INTRAVENOUS at 13:47

## 2018-09-26 RX ADMIN — PANTOPRAZOLE SODIUM SCH MG: 40 TABLET, DELAYED RELEASE ORAL at 20:13

## 2018-09-26 RX ADMIN — HEPARIN SODIUM SCH UNITS: 5000 INJECTION, SOLUTION INTRAVENOUS; SUBCUTANEOUS at 08:41

## 2018-09-26 RX ADMIN — SODIUM CHLORIDE, PRESERVATIVE FREE SCH ML: 5 INJECTION INTRAVENOUS at 21:00

## 2018-09-26 RX ADMIN — PANTOPRAZOLE SODIUM SCH MG: 40 TABLET, DELAYED RELEASE ORAL at 09:29

## 2018-09-26 RX ADMIN — DAPTOMYCIN SCH MLS/HR: 500 INJECTION, POWDER, LYOPHILIZED, FOR SOLUTION INTRAVENOUS at 16:43

## 2018-09-26 RX ADMIN — ACYCLOVIR SCH MG: 800 TABLET ORAL at 20:13

## 2018-09-26 RX ADMIN — FLUCONAZOLE, SODIUM CHLORIDE SCH MLS/HR: 2 INJECTION INTRAVENOUS at 22:30

## 2018-09-26 RX ADMIN — ONDANSETRON PRN MG: 4 TABLET, ORALLY DISINTEGRATING ORAL at 22:07

## 2018-09-26 RX ADMIN — SODIUM CHLORIDE, PRESERVATIVE FREE SCH ML: 5 INJECTION INTRAVENOUS at 09:32

## 2018-09-26 RX ADMIN — TBO-FILGRASTIM SCH MCG: 480 INJECTION, SOLUTION SUBCUTANEOUS at 11:57

## 2018-09-26 RX ADMIN — LACTULOSE SCH GM: 10 SOLUTION ORAL at 20:31

## 2018-09-27 VITALS — DIASTOLIC BLOOD PRESSURE: 69 MMHG | SYSTOLIC BLOOD PRESSURE: 113 MMHG

## 2018-09-27 VITALS — DIASTOLIC BLOOD PRESSURE: 65 MMHG | SYSTOLIC BLOOD PRESSURE: 109 MMHG

## 2018-09-27 VITALS — DIASTOLIC BLOOD PRESSURE: 62 MMHG | SYSTOLIC BLOOD PRESSURE: 105 MMHG

## 2018-09-27 VITALS — DIASTOLIC BLOOD PRESSURE: 64 MMHG | SYSTOLIC BLOOD PRESSURE: 107 MMHG

## 2018-09-27 LAB
<PLATELET ESTIMATE>: (no result)
<PLT MORPHOLOGY>: (no result)
ALBUMIN SERPL-MCNC: 1.8 G/DL (ref 3.4–5)
ALP SERPL-CCNC: 66 U/L (ref 45–117)
ALT SERPL-CCNC: 18 U/L (ref 12–78)
ANION GAP SERPL CALC-SCNC: 7 MMOL/L (ref 5–15)
ANISOCYTOSIS BLD QL SMEAR: (no result)
BILIRUB SERPL-MCNC: 0.6 MG/DL (ref 0.2–1)
CALCIUM SERPL-MCNC: 8.2 MG/DL (ref 8.5–10.1)
CHLORIDE SERPL-SCNC: 112 MMOL/L (ref 98–107)
CREAT SERPL-MCNC: 0.56 MG/DL (ref 0.55–1.02)
EOS#(MANUAL): 0.01 X10^3/UL (ref 0–0.4)
EOS% (MANUAL): 4 % (ref 1–7)
ERYTHROCYTE [DISTWIDTH] IN BLOOD BY AUTOMATED COUNT: 15.6 % (ref 9.6–15.2)
LYMPH#(MANUAL): 0.25 X10^3/UL (ref 1–3.4)
LYMPHS% (MANUAL): 84 % (ref 22–44)
MCH RBC QN AUTO: 32.8 PG (ref 27–34.8)
MCHC RBC AUTO-ENTMCNC: 35.4 G/DL (ref 32.4–35.8)
MCV RBC AUTO: 92.8 FL (ref 80–100)
MD: YES
OVALOCYTES BLD QL SMEAR: (no result)
PLATELET # BLD AUTO: 51 X10^3/UL (ref 130–400)
PMV BLD AUTO: 7.8 FL (ref 7.4–10.4)
PROT SERPL-MCNC: 4.4 G/DL (ref 6.4–8.2)
RBC # BLD AUTO: 2.81 X10^6/UL (ref 3.82–5.3)
SEG#(MANUAL): 0.04 X10^3/UL (ref 1.8–6.8)
SEGS% (MANUAL): 12 % (ref 42–75)

## 2018-09-27 RX ADMIN — SODIUM CHLORIDE SCH MLS/HR: 0.9 INJECTION, SOLUTION INTRAVENOUS at 00:27

## 2018-09-27 RX ADMIN — TBO-FILGRASTIM SCH MCG: 480 INJECTION, SOLUTION SUBCUTANEOUS at 11:01

## 2018-09-27 RX ADMIN — ACYCLOVIR SCH MG: 800 TABLET ORAL at 15:12

## 2018-09-27 RX ADMIN — ERTAPENEM SODIUM SCH MLS/HR: 1 INJECTION, POWDER, LYOPHILIZED, FOR SOLUTION INTRAMUSCULAR; INTRAVENOUS at 13:23

## 2018-09-27 RX ADMIN — SODIUM CHLORIDE SCH MLS/HR: 0.9 INJECTION, SOLUTION INTRAVENOUS at 09:52

## 2018-09-27 RX ADMIN — DAPTOMYCIN SCH MLS/HR: 500 INJECTION, POWDER, LYOPHILIZED, FOR SOLUTION INTRAVENOUS at 15:12

## 2018-09-27 RX ADMIN — B-COMPLEX W/ C & FOLIC ACID TAB SCH TAB: TAB at 09:53

## 2018-09-27 RX ADMIN — OXYCODONE HYDROCHLORIDE PRN MG: 5 TABLET ORAL at 16:40

## 2018-09-27 RX ADMIN — ACYCLOVIR SCH MG: 800 TABLET ORAL at 21:49

## 2018-09-27 RX ADMIN — MICONAZOLE NITRATE SCH APPLIC: 20 CREAM VAGINAL at 21:00

## 2018-09-27 RX ADMIN — HEPARIN SODIUM SCH UNITS: 5000 INJECTION, SOLUTION INTRAVENOUS; SUBCUTANEOUS at 21:50

## 2018-09-27 RX ADMIN — PANTOPRAZOLE SODIUM SCH MG: 40 TABLET, DELAYED RELEASE ORAL at 09:53

## 2018-09-27 RX ADMIN — CHOLECALCIFEROL TAB 125 MCG (5000 UNIT) SCH UNIT: 125 TAB at 09:53

## 2018-09-27 RX ADMIN — LACTULOSE SCH GM: 10 SOLUTION ORAL at 21:00

## 2018-09-27 RX ADMIN — OXYCODONE HYDROCHLORIDE PRN MG: 5 TABLET ORAL at 00:27

## 2018-09-27 RX ADMIN — HEPARIN SODIUM SCH UNITS: 5000 INJECTION, SOLUTION INTRAVENOUS; SUBCUTANEOUS at 09:53

## 2018-09-27 RX ADMIN — OXYCODONE HYDROCHLORIDE PRN MG: 5 TABLET ORAL at 22:08

## 2018-09-27 RX ADMIN — SODIUM CHLORIDE, PRESERVATIVE FREE SCH ML: 5 INJECTION INTRAVENOUS at 09:54

## 2018-09-27 RX ADMIN — ACYCLOVIR SCH MG: 800 TABLET ORAL at 09:53

## 2018-09-27 RX ADMIN — TEMAZEPAM PRN MG: 15 CAPSULE ORAL at 22:08

## 2018-09-27 RX ADMIN — PANTOPRAZOLE SODIUM SCH MG: 40 TABLET, DELAYED RELEASE ORAL at 21:50

## 2018-09-27 RX ADMIN — LACTULOSE SCH GM: 10 SOLUTION ORAL at 09:00

## 2018-09-27 RX ADMIN — FLUCONAZOLE, SODIUM CHLORIDE SCH MLS/HR: 2 INJECTION INTRAVENOUS at 22:10

## 2018-09-27 RX ADMIN — MAGNESIUM OXIDE SCH MG: 400 TABLET ORAL at 09:53

## 2018-09-27 RX ADMIN — SODIUM CHLORIDE SCH MLS/HR: 0.9 INJECTION, SOLUTION INTRAVENOUS at 20:45

## 2018-09-27 RX ADMIN — SODIUM CHLORIDE, PRESERVATIVE FREE SCH ML: 5 INJECTION INTRAVENOUS at 21:00

## 2018-09-28 VITALS — DIASTOLIC BLOOD PRESSURE: 71 MMHG | SYSTOLIC BLOOD PRESSURE: 115 MMHG

## 2018-09-28 VITALS — SYSTOLIC BLOOD PRESSURE: 125 MMHG | DIASTOLIC BLOOD PRESSURE: 74 MMHG

## 2018-09-28 VITALS — SYSTOLIC BLOOD PRESSURE: 138 MMHG | DIASTOLIC BLOOD PRESSURE: 76 MMHG

## 2018-09-28 VITALS — SYSTOLIC BLOOD PRESSURE: 126 MMHG | DIASTOLIC BLOOD PRESSURE: 76 MMHG

## 2018-09-28 LAB
<PLATELET ESTIMATE>: (no result)
<PLT MORPHOLOGY>: (no result)
ALBUMIN SERPL-MCNC: 1.9 G/DL (ref 3.4–5)
ALP SERPL-CCNC: 76 U/L (ref 45–117)
ALT SERPL-CCNC: 14 U/L (ref 12–78)
ANION GAP SERPL CALC-SCNC: 9 MMOL/L (ref 5–15)
ANISOCYTOSIS BLD QL SMEAR: (no result)
BILIRUB SERPL-MCNC: 0.3 MG/DL (ref 0.2–1)
CALCIUM SERPL-MCNC: 8.4 MG/DL (ref 8.5–10.1)
CHLORIDE SERPL-SCNC: 113 MMOL/L (ref 98–107)
CREAT SERPL-MCNC: 0.49 MG/DL (ref 0.55–1.02)
ERYTHROCYTE [DISTWIDTH] IN BLOOD BY AUTOMATED COUNT: 15.7 % (ref 9.6–15.2)
LYMPH#(MANUAL): 0.28 X10^3/UL (ref 1–3.4)
LYMPHS% (MANUAL): 92 % (ref 22–44)
MCH RBC QN AUTO: 32.7 PG (ref 27–34.8)
MCHC RBC AUTO-ENTMCNC: 35 G/DL (ref 32.4–35.8)
MCV RBC AUTO: 93.7 FL (ref 80–100)
MD: YES
MONOS#(MANUAL): 0.01 X10^3/UL (ref 0.3–2.7)
MONOS% (MANUAL): 4 % (ref 2–9)
PLATELET # BLD AUTO: 45 X10^3/UL (ref 130–400)
PMV BLD AUTO: 8.4 FL (ref 7.4–10.4)
PROT SERPL-MCNC: 4.4 G/DL (ref 6.4–8.2)
RBC # BLD AUTO: 2.71 X10^6/UL (ref 3.82–5.3)
SEG#(MANUAL): 0.01 X10^3/UL (ref 1.8–6.8)
SEGS% (MANUAL): 4 % (ref 42–75)

## 2018-09-28 RX ADMIN — HEPARIN SODIUM SCH UNITS: 5000 INJECTION, SOLUTION INTRAVENOUS; SUBCUTANEOUS at 19:35

## 2018-09-28 RX ADMIN — LACTULOSE SCH GM: 10 SOLUTION ORAL at 19:35

## 2018-09-28 RX ADMIN — CHOLECALCIFEROL TAB 125 MCG (5000 UNIT) SCH UNIT: 125 TAB at 09:09

## 2018-09-28 RX ADMIN — ONDANSETRON PRN MG: 4 TABLET, ORALLY DISINTEGRATING ORAL at 17:27

## 2018-09-28 RX ADMIN — DAPTOMYCIN SCH MLS/HR: 500 INJECTION, POWDER, LYOPHILIZED, FOR SOLUTION INTRAVENOUS at 16:43

## 2018-09-28 RX ADMIN — OXYCODONE HYDROCHLORIDE PRN MG: 5 TABLET ORAL at 12:10

## 2018-09-28 RX ADMIN — ACYCLOVIR SCH MG: 800 TABLET ORAL at 19:33

## 2018-09-28 RX ADMIN — ERTAPENEM SODIUM SCH MLS/HR: 1 INJECTION, POWDER, LYOPHILIZED, FOR SOLUTION INTRAMUSCULAR; INTRAVENOUS at 13:16

## 2018-09-28 RX ADMIN — SODIUM CHLORIDE SCH MLS/HR: 0.9 INJECTION, SOLUTION INTRAVENOUS at 12:10

## 2018-09-28 RX ADMIN — SODIUM CHLORIDE, PRESERVATIVE FREE SCH ML: 5 INJECTION INTRAVENOUS at 19:35

## 2018-09-28 RX ADMIN — TBO-FILGRASTIM SCH MCG: 480 INJECTION, SOLUTION SUBCUTANEOUS at 12:11

## 2018-09-28 RX ADMIN — MAGNESIUM OXIDE SCH MG: 400 TABLET ORAL at 09:08

## 2018-09-28 RX ADMIN — PANTOPRAZOLE SODIUM SCH MG: 40 TABLET, DELAYED RELEASE ORAL at 09:09

## 2018-09-28 RX ADMIN — ONDANSETRON PRN MG: 4 TABLET, ORALLY DISINTEGRATING ORAL at 12:10

## 2018-09-28 RX ADMIN — LACTULOSE SCH GM: 10 SOLUTION ORAL at 09:00

## 2018-09-28 RX ADMIN — MAGNESIUM OXIDE SCH MG: 400 TABLET ORAL at 09:00

## 2018-09-28 RX ADMIN — HEPARIN SODIUM SCH UNITS: 5000 INJECTION, SOLUTION INTRAVENOUS; SUBCUTANEOUS at 09:09

## 2018-09-28 RX ADMIN — ACYCLOVIR SCH MG: 800 TABLET ORAL at 09:09

## 2018-09-28 RX ADMIN — SODIUM CHLORIDE, PRESERVATIVE FREE SCH ML: 5 INJECTION INTRAVENOUS at 09:10

## 2018-09-28 RX ADMIN — PANTOPRAZOLE SODIUM SCH MG: 40 TABLET, DELAYED RELEASE ORAL at 19:34

## 2018-09-28 RX ADMIN — SODIUM CHLORIDE SCH MLS/HR: 0.9 INJECTION, SOLUTION INTRAVENOUS at 22:18

## 2018-09-28 RX ADMIN — ACYCLOVIR SCH MG: 800 TABLET ORAL at 16:46

## 2018-09-28 RX ADMIN — B-COMPLEX W/ C & FOLIC ACID TAB SCH TAB: TAB at 09:08

## 2018-09-28 RX ADMIN — MICONAZOLE NITRATE SCH APPLIC: 20 CREAM VAGINAL at 19:34

## 2018-09-29 VITALS — DIASTOLIC BLOOD PRESSURE: 63 MMHG | SYSTOLIC BLOOD PRESSURE: 102 MMHG

## 2018-09-29 VITALS — DIASTOLIC BLOOD PRESSURE: 69 MMHG | SYSTOLIC BLOOD PRESSURE: 108 MMHG

## 2018-09-29 VITALS — SYSTOLIC BLOOD PRESSURE: 115 MMHG | DIASTOLIC BLOOD PRESSURE: 53 MMHG

## 2018-09-29 VITALS — SYSTOLIC BLOOD PRESSURE: 117 MMHG | DIASTOLIC BLOOD PRESSURE: 85 MMHG

## 2018-09-29 LAB
<PLATELET ESTIMATE>: (no result)
<PLT MORPHOLOGY>: (no result)
ALBUMIN SERPL-MCNC: 1.9 G/DL (ref 3.4–5)
ALP SERPL-CCNC: 77 U/L (ref 45–117)
ALT SERPL-CCNC: 12 U/L (ref 12–78)
ANION GAP SERPL CALC-SCNC: 7 MMOL/L (ref 5–15)
ANISOCYTOSIS BLD QL SMEAR: (no result)
BILIRUB SERPL-MCNC: 0.3 MG/DL (ref 0.2–1)
CALCIUM SERPL-MCNC: 8.3 MG/DL (ref 8.5–10.1)
CHLORIDE SERPL-SCNC: 115 MMOL/L (ref 98–107)
CREAT SERPL-MCNC: 0.49 MG/DL (ref 0.55–1.02)
ERYTHROCYTE [DISTWIDTH] IN BLOOD BY AUTOMATED COUNT: 15.6 % (ref 9.6–15.2)
LYMPH#(MANUAL): 0.24 X10^3/UL (ref 1–3.4)
LYMPHS% (MANUAL): 80 % (ref 22–44)
MCH RBC QN AUTO: 32.7 PG (ref 27–34.8)
MCHC RBC AUTO-ENTMCNC: 35.1 G/DL (ref 32.4–35.8)
MCV RBC AUTO: 93.3 FL (ref 80–100)
MD: YES
MONOS#(MANUAL): 0.01 X10^3/UL (ref 0.3–2.7)
MONOS% (MANUAL): 4 % (ref 2–9)
OVALOCYTES BLD QL SMEAR: (no result)
PLATELET # BLD AUTO: 47 X10^3/UL (ref 130–400)
PMV BLD AUTO: 8.4 FL (ref 7.4–10.4)
PROT SERPL-MCNC: 4.5 G/DL (ref 6.4–8.2)
RBC # BLD AUTO: 2.7 X10^6/UL (ref 3.82–5.3)
SEG#(MANUAL): 0.05 X10^3/UL (ref 1.8–6.8)
SEGS% (MANUAL): 16 % (ref 42–75)

## 2018-09-29 RX ADMIN — ACYCLOVIR SCH MG: 800 TABLET ORAL at 09:00

## 2018-09-29 RX ADMIN — ACYCLOVIR SCH MG: 800 TABLET ORAL at 15:59

## 2018-09-29 RX ADMIN — TBO-FILGRASTIM SCH MCG: 480 INJECTION, SOLUTION SUBCUTANEOUS at 08:59

## 2018-09-29 RX ADMIN — MICONAZOLE NITRATE SCH APPLIC: 20 CREAM VAGINAL at 21:44

## 2018-09-29 RX ADMIN — ONDANSETRON PRN MG: 2 INJECTION, SOLUTION INTRAMUSCULAR; INTRAVENOUS at 11:44

## 2018-09-29 RX ADMIN — ERTAPENEM SODIUM SCH MLS/HR: 1 INJECTION, POWDER, LYOPHILIZED, FOR SOLUTION INTRAMUSCULAR; INTRAVENOUS at 12:38

## 2018-09-29 RX ADMIN — CHOLECALCIFEROL TAB 125 MCG (5000 UNIT) SCH UNIT: 125 TAB at 09:01

## 2018-09-29 RX ADMIN — MAGNESIUM OXIDE SCH MG: 400 TABLET ORAL at 09:01

## 2018-09-29 RX ADMIN — LACTULOSE SCH GM: 10 SOLUTION ORAL at 21:48

## 2018-09-29 RX ADMIN — PANTOPRAZOLE SODIUM SCH MG: 40 TABLET, DELAYED RELEASE ORAL at 21:45

## 2018-09-29 RX ADMIN — HEPARIN SODIUM SCH UNITS: 5000 INJECTION, SOLUTION INTRAVENOUS; SUBCUTANEOUS at 21:44

## 2018-09-29 RX ADMIN — PANTOPRAZOLE SODIUM SCH MG: 40 TABLET, DELAYED RELEASE ORAL at 09:00

## 2018-09-29 RX ADMIN — ACYCLOVIR SCH MG: 800 TABLET ORAL at 21:44

## 2018-09-29 RX ADMIN — OXYCODONE HYDROCHLORIDE PRN MG: 5 TABLET ORAL at 18:15

## 2018-09-29 RX ADMIN — SODIUM CHLORIDE, PRESERVATIVE FREE SCH ML: 5 INJECTION INTRAVENOUS at 09:02

## 2018-09-29 RX ADMIN — SODIUM CHLORIDE, PRESERVATIVE FREE SCH ML: 5 INJECTION INTRAVENOUS at 21:46

## 2018-09-29 RX ADMIN — SODIUM CHLORIDE SCH MLS/HR: 0.9 INJECTION, SOLUTION INTRAVENOUS at 12:38

## 2018-09-29 RX ADMIN — B-COMPLEX W/ C & FOLIC ACID TAB SCH TAB: TAB at 09:00

## 2018-09-29 RX ADMIN — DAPTOMYCIN SCH MLS/HR: 500 INJECTION, POWDER, LYOPHILIZED, FOR SOLUTION INTRAVENOUS at 15:58

## 2018-09-29 RX ADMIN — PROMETHAZINE HYDROCHLORIDE PRN MG: 25 INJECTION INTRAMUSCULAR; INTRAVENOUS at 18:17

## 2018-09-29 RX ADMIN — LACTULOSE SCH GM: 10 SOLUTION ORAL at 09:00

## 2018-09-29 RX ADMIN — HEPARIN SODIUM SCH UNITS: 5000 INJECTION, SOLUTION INTRAVENOUS; SUBCUTANEOUS at 08:59

## 2018-09-30 VITALS — DIASTOLIC BLOOD PRESSURE: 67 MMHG | SYSTOLIC BLOOD PRESSURE: 94 MMHG

## 2018-09-30 VITALS — SYSTOLIC BLOOD PRESSURE: 106 MMHG | DIASTOLIC BLOOD PRESSURE: 66 MMHG

## 2018-09-30 VITALS — DIASTOLIC BLOOD PRESSURE: 65 MMHG | SYSTOLIC BLOOD PRESSURE: 105 MMHG

## 2018-09-30 VITALS — SYSTOLIC BLOOD PRESSURE: 114 MMHG | DIASTOLIC BLOOD PRESSURE: 75 MMHG

## 2018-09-30 LAB
<PLATELET ESTIMATE>: (no result)
<PLT MORPHOLOGY>: (no result)
ALBUMIN SERPL-MCNC: 1.9 G/DL (ref 3.4–5)
ALP SERPL-CCNC: 78 U/L (ref 45–117)
ALT SERPL-CCNC: 13 U/L (ref 12–78)
ANION GAP SERPL CALC-SCNC: 8 MMOL/L (ref 5–15)
ANISOCYTOSIS BLD QL SMEAR: (no result)
BILIRUB SERPL-MCNC: 0.2 MG/DL (ref 0.2–1)
CALCIUM SERPL-MCNC: 8.3 MG/DL (ref 8.5–10.1)
CHLORIDE SERPL-SCNC: 115 MMOL/L (ref 98–107)
CREAT SERPL-MCNC: 0.54 MG/DL (ref 0.55–1.02)
ERYTHROCYTE [DISTWIDTH] IN BLOOD BY AUTOMATED COUNT: 15.2 % (ref 9.6–15.2)
LYMPH#(MANUAL): 0.18 X10^3/UL (ref 1–3.4)
LYMPHS% (MANUAL): 25 % (ref 22–44)
MCH RBC QN AUTO: 32.4 PG (ref 27–34.8)
MCHC RBC AUTO-ENTMCNC: 34.4 G/DL (ref 32.4–35.8)
MCV RBC AUTO: 94.1 FL (ref 80–100)
MD: YES
MONOS#(MANUAL): 0.04 X10^3/UL (ref 0.3–2.7)
MONOS% (MANUAL): 5 % (ref 2–9)
PLATELET # BLD AUTO: 68 X10^3/UL (ref 130–400)
PMV BLD AUTO: 8.9 FL (ref 7.4–10.4)
POLYCHROMASIA BLD QL SMEAR: (no result)
PROT SERPL-MCNC: 4.5 G/DL (ref 6.4–8.2)
RBC # BLD AUTO: 2.69 X10^6/UL (ref 3.82–5.3)
SEG#(MANUAL): 0.49 X10^3/UL (ref 1.8–6.8)
SEGS% (MANUAL): 70 % (ref 42–75)

## 2018-09-30 RX ADMIN — SODIUM CHLORIDE, PRESERVATIVE FREE SCH ML: 5 INJECTION INTRAVENOUS at 20:19

## 2018-09-30 RX ADMIN — CHOLECALCIFEROL TAB 125 MCG (5000 UNIT) SCH UNIT: 125 TAB at 09:27

## 2018-09-30 RX ADMIN — OXYCODONE HYDROCHLORIDE PRN MG: 5 TABLET ORAL at 05:15

## 2018-09-30 RX ADMIN — MAGNESIUM OXIDE SCH MG: 400 TABLET ORAL at 09:26

## 2018-09-30 RX ADMIN — PANTOPRAZOLE SODIUM SCH MG: 40 TABLET, DELAYED RELEASE ORAL at 09:28

## 2018-09-30 RX ADMIN — HEPARIN SODIUM SCH UNITS: 5000 INJECTION, SOLUTION INTRAVENOUS; SUBCUTANEOUS at 20:18

## 2018-09-30 RX ADMIN — SODIUM CHLORIDE, PRESERVATIVE FREE SCH ML: 5 INJECTION INTRAVENOUS at 09:00

## 2018-09-30 RX ADMIN — ACYCLOVIR SCH MG: 800 TABLET ORAL at 16:51

## 2018-09-30 RX ADMIN — ERTAPENEM SODIUM SCH MLS/HR: 1 INJECTION, POWDER, LYOPHILIZED, FOR SOLUTION INTRAMUSCULAR; INTRAVENOUS at 12:54

## 2018-09-30 RX ADMIN — HEPARIN SODIUM SCH UNITS: 5000 INJECTION, SOLUTION INTRAVENOUS; SUBCUTANEOUS at 09:29

## 2018-09-30 RX ADMIN — LACTULOSE SCH GM: 10 SOLUTION ORAL at 09:26

## 2018-09-30 RX ADMIN — ONDANSETRON PRN MG: 4 TABLET, ORALLY DISINTEGRATING ORAL at 05:15

## 2018-09-30 RX ADMIN — ACYCLOVIR SCH MG: 800 TABLET ORAL at 09:26

## 2018-09-30 RX ADMIN — SODIUM CHLORIDE SCH MLS/HR: 0.9 INJECTION, SOLUTION INTRAVENOUS at 05:07

## 2018-09-30 RX ADMIN — PANTOPRAZOLE SODIUM SCH MG: 40 TABLET, DELAYED RELEASE ORAL at 20:19

## 2018-09-30 RX ADMIN — ACYCLOVIR SCH MG: 800 TABLET ORAL at 20:19

## 2018-09-30 RX ADMIN — DAPTOMYCIN SCH MLS/HR: 500 INJECTION, POWDER, LYOPHILIZED, FOR SOLUTION INTRAVENOUS at 16:51

## 2018-09-30 RX ADMIN — LACTULOSE SCH GM: 10 SOLUTION ORAL at 20:19

## 2018-09-30 RX ADMIN — OXYCODONE HYDROCHLORIDE PRN MG: 5 TABLET ORAL at 16:51

## 2018-09-30 RX ADMIN — METHOCARBAMOL PRN MG: 750 TABLET ORAL at 21:26

## 2018-09-30 RX ADMIN — B-COMPLEX W/ C & FOLIC ACID TAB SCH TAB: TAB at 09:26

## 2018-09-30 RX ADMIN — ONDANSETRON PRN MG: 4 TABLET, ORALLY DISINTEGRATING ORAL at 16:59

## 2018-09-30 RX ADMIN — TBO-FILGRASTIM SCH MCG: 480 INJECTION, SOLUTION SUBCUTANEOUS at 11:03

## 2018-10-01 VITALS — SYSTOLIC BLOOD PRESSURE: 107 MMHG | DIASTOLIC BLOOD PRESSURE: 72 MMHG

## 2018-10-01 VITALS — SYSTOLIC BLOOD PRESSURE: 97 MMHG | DIASTOLIC BLOOD PRESSURE: 65 MMHG

## 2018-10-01 VITALS — SYSTOLIC BLOOD PRESSURE: 104 MMHG | DIASTOLIC BLOOD PRESSURE: 65 MMHG

## 2018-10-01 VITALS — SYSTOLIC BLOOD PRESSURE: 100 MMHG | DIASTOLIC BLOOD PRESSURE: 55 MMHG

## 2018-10-01 LAB
<PLATELET ESTIMATE>: (no result)
<PLT MORPHOLOGY>: (no result)
ALBUMIN SERPL-MCNC: 2 G/DL (ref 3.4–5)
ALP SERPL-CCNC: 89 U/L (ref 45–117)
ALT SERPL-CCNC: 11 U/L (ref 12–78)
ANION GAP SERPL CALC-SCNC: 8 MMOL/L (ref 5–15)
ANISOCYTOSIS BLD QL SMEAR: (no result)
BAND#(MANUAL): 0.14 X10^3/UL
BILIRUB SERPL-MCNC: 0.2 MG/DL (ref 0.2–1)
CALCIUM SERPL-MCNC: 7.9 MG/DL (ref 8.5–10.1)
CHLORIDE SERPL-SCNC: 113 MMOL/L (ref 98–107)
CREAT SERPL-MCNC: 0.51 MG/DL (ref 0.55–1.02)
CRP SERPL-MCNC: 1.5 MG/DL (ref 0.02–0.49)
EOS#(MANUAL): 0.02 X10^3/UL (ref 0–0.4)
EOS% (MANUAL): 1 % (ref 1–7)
ERYTHROCYTE [DISTWIDTH] IN BLOOD BY AUTOMATED COUNT: 15.4 % (ref 9.6–15.2)
ERYTHROCYTE [SEDIMENTATION RATE] IN BLOOD BY WESTERGREN METHOD: 49 MM/HR (ref 0–20)
HCT (SEDRATE): 25.5 % (ref 34.6–47.8)
LYMPH#(MANUAL): 0.27 X10^3/UL (ref 1–3.4)
LYMPHS% (MANUAL): 17 % (ref 22–44)
MCH RBC QN AUTO: 32.7 PG (ref 27–34.8)
MCHC RBC AUTO-ENTMCNC: 34.6 G/DL (ref 32.4–35.8)
MCV RBC AUTO: 94.6 FL (ref 80–100)
MD: YES
MONOS#(MANUAL): 0.06 X10^3/UL (ref 0.3–2.7)
MONOS% (MANUAL): 4 % (ref 2–9)
NEUTS BAND NFR BLD: 9 % (ref 0–7)
PLATELET # BLD AUTO: 99 X10^3/UL (ref 130–400)
PMV BLD AUTO: 8.6 FL (ref 7.4–10.4)
POLYCHROMASIA BLD QL SMEAR: (no result)
PROT SERPL-MCNC: 4.5 G/DL (ref 6.4–8.2)
RBC # BLD AUTO: 2.72 X10^6/UL (ref 3.82–5.3)
SEG#(MANUAL): 1.1 X10^3/UL (ref 1.8–6.8)
SEGS% (MANUAL): 69 % (ref 42–75)

## 2018-10-01 RX ADMIN — LACTULOSE SCH GM: 10 SOLUTION ORAL at 09:00

## 2018-10-01 RX ADMIN — OXYCODONE HYDROCHLORIDE PRN MG: 5 TABLET ORAL at 16:50

## 2018-10-01 RX ADMIN — CHOLECALCIFEROL TAB 125 MCG (5000 UNIT) SCH UNIT: 125 TAB at 09:45

## 2018-10-01 RX ADMIN — ONDANSETRON PRN MG: 4 TABLET, ORALLY DISINTEGRATING ORAL at 16:49

## 2018-10-01 RX ADMIN — ERTAPENEM SODIUM SCH MLS/HR: 1 INJECTION, POWDER, LYOPHILIZED, FOR SOLUTION INTRAMUSCULAR; INTRAVENOUS at 15:26

## 2018-10-01 RX ADMIN — B-COMPLEX W/ C & FOLIC ACID TAB SCH TAB: TAB at 09:45

## 2018-10-01 RX ADMIN — OXYCODONE HYDROCHLORIDE PRN MG: 5 TABLET ORAL at 09:45

## 2018-10-01 RX ADMIN — TBO-FILGRASTIM SCH MCG: 480 INJECTION, SOLUTION SUBCUTANEOUS at 12:01

## 2018-10-01 RX ADMIN — HEPARIN SODIUM SCH UNITS: 5000 INJECTION, SOLUTION INTRAVENOUS; SUBCUTANEOUS at 21:45

## 2018-10-01 RX ADMIN — DAPTOMYCIN SCH MLS/HR: 500 INJECTION, POWDER, LYOPHILIZED, FOR SOLUTION INTRAVENOUS at 16:13

## 2018-10-01 RX ADMIN — LACTULOSE SCH GM: 10 SOLUTION ORAL at 22:06

## 2018-10-01 RX ADMIN — METHOCARBAMOL PRN MG: 750 TABLET ORAL at 21:44

## 2018-10-01 RX ADMIN — ACYCLOVIR SCH MG: 800 TABLET ORAL at 21:45

## 2018-10-01 RX ADMIN — SODIUM CHLORIDE, PRESERVATIVE FREE SCH ML: 5 INJECTION INTRAVENOUS at 21:45

## 2018-10-01 RX ADMIN — ACYCLOVIR SCH MG: 800 TABLET ORAL at 16:49

## 2018-10-01 RX ADMIN — HEPARIN SODIUM SCH UNITS: 5000 INJECTION, SOLUTION INTRAVENOUS; SUBCUTANEOUS at 09:43

## 2018-10-01 RX ADMIN — PANTOPRAZOLE SODIUM SCH MG: 40 TABLET, DELAYED RELEASE ORAL at 21:45

## 2018-10-01 RX ADMIN — ACYCLOVIR SCH MG: 800 TABLET ORAL at 09:45

## 2018-10-01 RX ADMIN — MAGNESIUM OXIDE SCH MG: 400 TABLET ORAL at 09:58

## 2018-10-01 RX ADMIN — LACTULOSE SCH GM: 10 SOLUTION ORAL at 21:44

## 2018-10-01 RX ADMIN — SODIUM CHLORIDE, PRESERVATIVE FREE SCH ML: 5 INJECTION INTRAVENOUS at 09:47

## 2018-10-01 RX ADMIN — PANTOPRAZOLE SODIUM SCH MG: 40 TABLET, DELAYED RELEASE ORAL at 09:46

## 2018-10-02 VITALS — DIASTOLIC BLOOD PRESSURE: 53 MMHG | SYSTOLIC BLOOD PRESSURE: 96 MMHG

## 2018-10-02 VITALS — SYSTOLIC BLOOD PRESSURE: 109 MMHG | DIASTOLIC BLOOD PRESSURE: 62 MMHG

## 2018-10-02 VITALS — SYSTOLIC BLOOD PRESSURE: 96 MMHG | DIASTOLIC BLOOD PRESSURE: 53 MMHG

## 2018-10-02 VITALS — SYSTOLIC BLOOD PRESSURE: 114 MMHG | DIASTOLIC BLOOD PRESSURE: 66 MMHG

## 2018-10-02 LAB
<PLATELET ESTIMATE>: ADEQUATE
<PLT MORPHOLOGY>: (no result)
ALBUMIN SERPL-MCNC: 2.1 G/DL (ref 3.4–5)
ALP SERPL-CCNC: 99 U/L (ref 45–117)
ALT SERPL-CCNC: 12 U/L (ref 12–78)
ANION GAP SERPL CALC-SCNC: 8 MMOL/L (ref 5–15)
ANISOCYTOSIS BLD QL SMEAR: (no result)
BAND#(MANUAL): 0.48 X10^3/UL
BILIRUB SERPL-MCNC: 0.2 MG/DL (ref 0.2–1)
CALCIUM SERPL-MCNC: 8 MG/DL (ref 8.5–10.1)
CHLORIDE SERPL-SCNC: 113 MMOL/L (ref 98–107)
CREAT SERPL-MCNC: 0.62 MG/DL (ref 0.55–1.02)
ERYTHROCYTE [DISTWIDTH] IN BLOOD BY AUTOMATED COUNT: 15.8 % (ref 9.6–15.2)
LYMPH#(MANUAL): 0.81 X10^3/UL (ref 1–3.4)
LYMPHS% (MANUAL): 22 % (ref 22–44)
MCH RBC QN AUTO: 33 PG (ref 27–34.8)
MCHC RBC AUTO-ENTMCNC: 34.8 G/DL (ref 32.4–35.8)
MCV RBC AUTO: 94.9 FL (ref 80–100)
MD: YES
MONOS#(MANUAL): 0.07 X10^3/UL (ref 0.3–2.7)
MONOS% (MANUAL): 2 % (ref 2–9)
NEUTS BAND NFR BLD: 13 % (ref 0–7)
PLATELET # BLD AUTO: 142 X10^3/UL (ref 130–400)
PMV BLD AUTO: 7.9 FL (ref 7.4–10.4)
POLYCHROMASIA BLD QL SMEAR: (no result)
PROT SERPL-MCNC: 4.6 G/DL (ref 6.4–8.2)
RBC # BLD AUTO: 2.68 X10^6/UL (ref 3.82–5.3)
SEG#(MANUAL): 2.33 X10^3/UL (ref 1.8–6.8)
SEGS% (MANUAL): 63 % (ref 42–75)

## 2018-10-02 RX ADMIN — PROMETHAZINE HYDROCHLORIDE PRN MG: 25 INJECTION INTRAMUSCULAR; INTRAVENOUS at 19:55

## 2018-10-02 RX ADMIN — HEPARIN SODIUM SCH UNITS: 5000 INJECTION, SOLUTION INTRAVENOUS; SUBCUTANEOUS at 08:31

## 2018-10-02 RX ADMIN — B-COMPLEX W/ C & FOLIC ACID TAB SCH TAB: TAB at 08:29

## 2018-10-02 RX ADMIN — ERTAPENEM SODIUM SCH MLS/HR: 1 INJECTION, POWDER, LYOPHILIZED, FOR SOLUTION INTRAMUSCULAR; INTRAVENOUS at 15:46

## 2018-10-02 RX ADMIN — DAPTOMYCIN SCH MLS/HR: 500 INJECTION, POWDER, LYOPHILIZED, FOR SOLUTION INTRAVENOUS at 16:52

## 2018-10-02 RX ADMIN — ACYCLOVIR SCH MG: 800 TABLET ORAL at 20:15

## 2018-10-02 RX ADMIN — LACTULOSE SCH GM: 10 SOLUTION ORAL at 19:54

## 2018-10-02 RX ADMIN — METHOCARBAMOL PRN MG: 750 TABLET ORAL at 08:30

## 2018-10-02 RX ADMIN — PANTOPRAZOLE SODIUM SCH MG: 40 TABLET, DELAYED RELEASE ORAL at 20:15

## 2018-10-02 RX ADMIN — ACYCLOVIR SCH MG: 800 TABLET ORAL at 08:30

## 2018-10-02 RX ADMIN — PANTOPRAZOLE SODIUM SCH MG: 40 TABLET, DELAYED RELEASE ORAL at 08:30

## 2018-10-02 RX ADMIN — CHOLECALCIFEROL TAB 125 MCG (5000 UNIT) SCH UNIT: 125 TAB at 08:30

## 2018-10-02 RX ADMIN — OXYCODONE HYDROCHLORIDE PRN MG: 5 TABLET ORAL at 23:09

## 2018-10-02 RX ADMIN — SODIUM CHLORIDE, PRESERVATIVE FREE SCH ML: 5 INJECTION INTRAVENOUS at 08:30

## 2018-10-02 RX ADMIN — OXYCODONE HYDROCHLORIDE PRN MG: 5 TABLET ORAL at 15:53

## 2018-10-02 RX ADMIN — ONDANSETRON PRN MG: 2 INJECTION, SOLUTION INTRAMUSCULAR; INTRAVENOUS at 15:53

## 2018-10-02 RX ADMIN — ACYCLOVIR SCH MG: 800 TABLET ORAL at 16:52

## 2018-10-02 RX ADMIN — SODIUM CHLORIDE, PRESERVATIVE FREE SCH ML: 5 INJECTION INTRAVENOUS at 19:54

## 2018-10-02 RX ADMIN — MAGNESIUM OXIDE SCH MG: 400 TABLET ORAL at 08:30

## 2018-10-02 RX ADMIN — HEPARIN SODIUM SCH UNITS: 5000 INJECTION, SOLUTION INTRAVENOUS; SUBCUTANEOUS at 20:15

## 2018-10-02 RX ADMIN — LACTULOSE SCH GM: 10 SOLUTION ORAL at 08:15

## 2018-10-03 VITALS — SYSTOLIC BLOOD PRESSURE: 106 MMHG | DIASTOLIC BLOOD PRESSURE: 64 MMHG

## 2018-10-03 VITALS — SYSTOLIC BLOOD PRESSURE: 95 MMHG | DIASTOLIC BLOOD PRESSURE: 54 MMHG

## 2018-10-03 VITALS — SYSTOLIC BLOOD PRESSURE: 111 MMHG | DIASTOLIC BLOOD PRESSURE: 52 MMHG

## 2018-10-03 VITALS — DIASTOLIC BLOOD PRESSURE: 57 MMHG | SYSTOLIC BLOOD PRESSURE: 102 MMHG

## 2018-10-03 LAB
<PLATELET ESTIMATE>: ADEQUATE
<PLT MORPHOLOGY>: (no result)
ALBUMIN SERPL-MCNC: 2.2 G/DL (ref 3.4–5)
ALP SERPL-CCNC: 104 U/L (ref 45–117)
ALT SERPL-CCNC: 13 U/L (ref 12–78)
ANION GAP SERPL CALC-SCNC: 7 MMOL/L (ref 5–15)
ANISOCYTOSIS BLD QL SMEAR: (no result)
BAND#(MANUAL): 0.26 X10^3/UL
BILIRUB SERPL-MCNC: 0.2 MG/DL (ref 0.2–1)
CALCIUM SERPL-MCNC: 8.2 MG/DL (ref 8.5–10.1)
CHLORIDE SERPL-SCNC: 113 MMOL/L (ref 98–107)
CREAT SERPL-MCNC: 0.64 MG/DL (ref 0.55–1.02)
ERYTHROCYTE [DISTWIDTH] IN BLOOD BY AUTOMATED COUNT: 15.9 % (ref 9.6–15.2)
LYMPH#(MANUAL): 0.48 X10^3/UL (ref 1–3.4)
LYMPHS% (MANUAL): 13 % (ref 22–44)
MCH RBC QN AUTO: 32.2 PG (ref 27–34.8)
MCHC RBC AUTO-ENTMCNC: 33.7 G/DL (ref 32.4–35.8)
MCV RBC AUTO: 95.7 FL (ref 80–100)
MD: YES
METAMYELOCYTES# (MANUAL): 0.07 X10^3/UL (ref 0–0)
METAMYELOCYTES% (MANUAL): 2 % (ref 0–1)
MONOS#(MANUAL): 0.11 X10^3/UL (ref 0.3–2.7)
MONOS% (MANUAL): 3 % (ref 2–9)
NEUTS BAND NFR BLD: 7 % (ref 0–7)
PLATELET # BLD AUTO: 189 X10^3/UL (ref 130–400)
PMV BLD AUTO: 7.9 FL (ref 7.4–10.4)
POLYCHROMASIA BLD QL SMEAR: (no result)
PROT SERPL-MCNC: 4.7 G/DL (ref 6.4–8.2)
RBC # BLD AUTO: 2.74 X10^6/UL (ref 3.82–5.3)
SEG#(MANUAL): 2.78 X10^3/UL (ref 1.8–6.8)
SEGS% (MANUAL): 75 % (ref 42–75)

## 2018-10-03 RX ADMIN — MAGNESIUM OXIDE SCH MG: 400 TABLET ORAL at 09:33

## 2018-10-03 RX ADMIN — ERTAPENEM SODIUM SCH MLS/HR: 1 INJECTION, POWDER, LYOPHILIZED, FOR SOLUTION INTRAMUSCULAR; INTRAVENOUS at 15:22

## 2018-10-03 RX ADMIN — ACYCLOVIR SCH MG: 800 TABLET ORAL at 20:53

## 2018-10-03 RX ADMIN — DAPTOMYCIN SCH MLS/HR: 500 INJECTION, POWDER, LYOPHILIZED, FOR SOLUTION INTRAVENOUS at 17:41

## 2018-10-03 RX ADMIN — OXYCODONE HYDROCHLORIDE PRN MG: 5 TABLET ORAL at 11:40

## 2018-10-03 RX ADMIN — HEPARIN SODIUM SCH UNITS: 5000 INJECTION, SOLUTION INTRAVENOUS; SUBCUTANEOUS at 09:34

## 2018-10-03 RX ADMIN — LACTULOSE SCH GM: 10 SOLUTION ORAL at 09:34

## 2018-10-03 RX ADMIN — ACYCLOVIR SCH MG: 800 TABLET ORAL at 17:08

## 2018-10-03 RX ADMIN — ONDANSETRON PRN MG: 4 TABLET, ORALLY DISINTEGRATING ORAL at 21:22

## 2018-10-03 RX ADMIN — B-COMPLEX W/ C & FOLIC ACID TAB SCH TAB: TAB at 09:34

## 2018-10-03 RX ADMIN — ACYCLOVIR SCH MG: 800 TABLET ORAL at 09:34

## 2018-10-03 RX ADMIN — PANTOPRAZOLE SODIUM SCH MG: 40 TABLET, DELAYED RELEASE ORAL at 09:34

## 2018-10-03 RX ADMIN — SODIUM CHLORIDE, PRESERVATIVE FREE SCH ML: 5 INJECTION INTRAVENOUS at 09:35

## 2018-10-03 RX ADMIN — ONDANSETRON PRN MG: 2 INJECTION, SOLUTION INTRAMUSCULAR; INTRAVENOUS at 14:18

## 2018-10-03 RX ADMIN — SODIUM CHLORIDE, PRESERVATIVE FREE SCH ML: 5 INJECTION INTRAVENOUS at 20:53

## 2018-10-03 RX ADMIN — CHOLECALCIFEROL TAB 125 MCG (5000 UNIT) SCH UNIT: 125 TAB at 09:34

## 2018-10-03 RX ADMIN — PANTOPRAZOLE SODIUM SCH MG: 40 TABLET, DELAYED RELEASE ORAL at 20:53

## 2018-10-03 RX ADMIN — LACTULOSE SCH GM: 10 SOLUTION ORAL at 20:53

## 2018-10-03 RX ADMIN — HEPARIN SODIUM SCH UNITS: 5000 INJECTION, SOLUTION INTRAVENOUS; SUBCUTANEOUS at 20:53

## 2018-10-03 RX ADMIN — OXYCODONE HYDROCHLORIDE PRN MG: 5 TABLET ORAL at 21:22

## 2018-10-04 VITALS — DIASTOLIC BLOOD PRESSURE: 70 MMHG | SYSTOLIC BLOOD PRESSURE: 123 MMHG

## 2018-10-04 VITALS — SYSTOLIC BLOOD PRESSURE: 109 MMHG | DIASTOLIC BLOOD PRESSURE: 69 MMHG

## 2018-10-04 VITALS — DIASTOLIC BLOOD PRESSURE: 61 MMHG | SYSTOLIC BLOOD PRESSURE: 110 MMHG

## 2018-10-04 VITALS — DIASTOLIC BLOOD PRESSURE: 77 MMHG | SYSTOLIC BLOOD PRESSURE: 134 MMHG

## 2018-10-04 LAB
<PLATELET ESTIMATE>: ADEQUATE
<PLT MORPHOLOGY>: (no result)
ALBUMIN SERPL-MCNC: 2.2 G/DL (ref 3.4–5)
ALP SERPL-CCNC: 110 U/L (ref 45–117)
ALT SERPL-CCNC: 15 U/L (ref 12–78)
ANION GAP SERPL CALC-SCNC: 7 MMOL/L (ref 5–15)
ANISOCYTOSIS BLD QL SMEAR: (no result)
BAND#(MANUAL): 0.15 X10^3/UL
BILIRUB SERPL-MCNC: 0.1 MG/DL (ref 0.2–1)
CALCIUM SERPL-MCNC: 8.2 MG/DL (ref 8.5–10.1)
CHLORIDE SERPL-SCNC: 114 MMOL/L (ref 98–107)
CREAT SERPL-MCNC: 0.67 MG/DL (ref 0.55–1.02)
ERYTHROCYTE [DISTWIDTH] IN BLOOD BY AUTOMATED COUNT: 16 % (ref 9.6–15.2)
LYMPH#(MANUAL): 0.48 X10^3/UL (ref 1–3.4)
LYMPHS% (MANUAL): 16 % (ref 22–44)
MCH RBC QN AUTO: 32.8 PG (ref 27–34.8)
MCHC RBC AUTO-ENTMCNC: 34.3 G/DL (ref 32.4–35.8)
MCV RBC AUTO: 95.7 FL (ref 80–100)
MD: YES
METAMYELOCYTES# (MANUAL): 0.09 X10^3/UL (ref 0–0)
METAMYELOCYTES% (MANUAL): 3 % (ref 0–1)
MONOS#(MANUAL): 0.21 X10^3/UL (ref 0.3–2.7)
MONOS% (MANUAL): 7 % (ref 2–9)
MYELOCYTES# (MANUAL): 0.03 X10^3/UL (ref 0–0)
MYELOCYTES% (MANUAL): 1 % (ref 0–0)
NEUTS BAND NFR BLD: 5 % (ref 0–7)
PLATELET # BLD AUTO: 254 X10^3/UL (ref 130–400)
PMV BLD AUTO: 7.6 FL (ref 7.4–10.4)
POLYCHROMASIA BLD QL SMEAR: (no result)
PROT SERPL-MCNC: 4.8 G/DL (ref 6.4–8.2)
RBC # BLD AUTO: 2.76 X10^6/UL (ref 3.82–5.3)
SEG#(MANUAL): 2.04 X10^3/UL (ref 1.8–6.8)
SEGS% (MANUAL): 68 % (ref 42–75)

## 2018-10-04 RX ADMIN — DAPTOMYCIN SCH MLS/HR: 500 INJECTION, POWDER, LYOPHILIZED, FOR SOLUTION INTRAVENOUS at 17:35

## 2018-10-04 RX ADMIN — OXYCODONE HYDROCHLORIDE PRN MG: 5 TABLET ORAL at 20:52

## 2018-10-04 RX ADMIN — OXYCODONE HYDROCHLORIDE PRN MG: 5 TABLET ORAL at 08:53

## 2018-10-04 RX ADMIN — ALLOPURINOL SCH MG: 300 TABLET ORAL at 17:37

## 2018-10-04 RX ADMIN — PANTOPRAZOLE SODIUM SCH MG: 40 TABLET, DELAYED RELEASE ORAL at 20:42

## 2018-10-04 RX ADMIN — ONDANSETRON PRN MG: 4 TABLET, ORALLY DISINTEGRATING ORAL at 20:52

## 2018-10-04 RX ADMIN — LACTULOSE SCH GM: 10 SOLUTION ORAL at 08:54

## 2018-10-04 RX ADMIN — ONDANSETRON PRN MG: 4 TABLET, ORALLY DISINTEGRATING ORAL at 14:08

## 2018-10-04 RX ADMIN — B-COMPLEX W/ C & FOLIC ACID TAB SCH TAB: TAB at 08:53

## 2018-10-04 RX ADMIN — MAGNESIUM OXIDE SCH MG: 400 TABLET ORAL at 08:53

## 2018-10-04 RX ADMIN — ERTAPENEM SODIUM SCH MLS/HR: 1 INJECTION, POWDER, LYOPHILIZED, FOR SOLUTION INTRAMUSCULAR; INTRAVENOUS at 14:39

## 2018-10-04 RX ADMIN — SODIUM CHLORIDE, PRESERVATIVE FREE SCH ML: 5 INJECTION INTRAVENOUS at 20:44

## 2018-10-04 RX ADMIN — LACTULOSE SCH GM: 10 SOLUTION ORAL at 20:42

## 2018-10-04 RX ADMIN — CHOLECALCIFEROL TAB 125 MCG (5000 UNIT) SCH UNIT: 125 TAB at 08:53

## 2018-10-04 RX ADMIN — HEPARIN SODIUM SCH UNITS: 5000 INJECTION, SOLUTION INTRAVENOUS; SUBCUTANEOUS at 08:53

## 2018-10-04 RX ADMIN — ACYCLOVIR SCH MG: 800 TABLET ORAL at 17:37

## 2018-10-04 RX ADMIN — SODIUM CHLORIDE, PRESERVATIVE FREE SCH ML: 5 INJECTION INTRAVENOUS at 08:54

## 2018-10-04 RX ADMIN — ACYCLOVIR SCH MG: 800 TABLET ORAL at 20:42

## 2018-10-04 RX ADMIN — HEPARIN SODIUM SCH UNITS: 5000 INJECTION, SOLUTION INTRAVENOUS; SUBCUTANEOUS at 20:45

## 2018-10-04 RX ADMIN — OXYCODONE HYDROCHLORIDE PRN MG: 5 TABLET ORAL at 14:48

## 2018-10-04 RX ADMIN — PANTOPRAZOLE SODIUM SCH MG: 40 TABLET, DELAYED RELEASE ORAL at 08:53

## 2018-10-04 RX ADMIN — ACYCLOVIR SCH MG: 800 TABLET ORAL at 08:53

## 2018-10-05 VITALS — DIASTOLIC BLOOD PRESSURE: 73 MMHG | SYSTOLIC BLOOD PRESSURE: 114 MMHG

## 2018-10-05 VITALS — DIASTOLIC BLOOD PRESSURE: 66 MMHG | SYSTOLIC BLOOD PRESSURE: 104 MMHG

## 2018-10-05 VITALS — SYSTOLIC BLOOD PRESSURE: 115 MMHG | DIASTOLIC BLOOD PRESSURE: 73 MMHG

## 2018-10-05 VITALS — DIASTOLIC BLOOD PRESSURE: 66 MMHG | SYSTOLIC BLOOD PRESSURE: 116 MMHG

## 2018-10-05 LAB
<PLATELET ESTIMATE>: ADEQUATE
<PLT MORPHOLOGY>: (no result)
ALBUMIN SERPL-MCNC: 2.5 G/DL (ref 3.4–5)
ALP SERPL-CCNC: 127 U/L (ref 45–117)
ALT SERPL-CCNC: 18 U/L (ref 12–78)
ANION GAP SERPL CALC-SCNC: 9 MMOL/L (ref 5–15)
ANISOCYTOSIS BLD QL SMEAR: (no result)
BAND#(MANUAL): 0.24 X10^3/UL
BILIRUB SERPL-MCNC: 0.1 MG/DL (ref 0.2–1)
CALCIUM SERPL-MCNC: 8.7 MG/DL (ref 8.5–10.1)
CHLORIDE SERPL-SCNC: 109 MMOL/L (ref 98–107)
CREAT SERPL-MCNC: 0.71 MG/DL (ref 0.55–1.02)
EOS#(MANUAL): 0.03 X10^3/UL (ref 0–0.4)
EOS% (MANUAL): 1 % (ref 1–7)
ERYTHROCYTE [DISTWIDTH] IN BLOOD BY AUTOMATED COUNT: 16.2 % (ref 9.6–15.2)
LYMPH#(MANUAL): 0.36 X10^3/UL (ref 1–3.4)
LYMPHS% (MANUAL): 12 % (ref 22–44)
MCH RBC QN AUTO: 33.5 PG (ref 27–34.8)
MCHC RBC AUTO-ENTMCNC: 34.7 G/DL (ref 32.4–35.8)
MCV RBC AUTO: 96.5 FL (ref 80–100)
MD: YES
METAMYELOCYTES# (MANUAL): 0.15 X10^3/UL (ref 0–0)
METAMYELOCYTES% (MANUAL): 5 % (ref 0–1)
MONOS#(MANUAL): 0.15 X10^3/UL (ref 0.3–2.7)
MONOS% (MANUAL): 5 % (ref 2–9)
MYELOCYTES# (MANUAL): 0.09 X10^3/UL (ref 0–0)
MYELOCYTES% (MANUAL): 3 % (ref 0–0)
NEUTS BAND NFR BLD: 8 % (ref 0–7)
NRBC % (MANUAL): 1 % (ref 0–1)
PLATELET # BLD AUTO: 272 X10^3/UL (ref 130–400)
PMV BLD AUTO: 7.5 FL (ref 7.4–10.4)
POLYCHROMASIA BLD QL SMEAR: (no result)
PROT SERPL-MCNC: 5.4 G/DL (ref 6.4–8.2)
RBC # BLD AUTO: 2.94 X10^6/UL (ref 3.82–5.3)
SEG#(MANUAL): 1.98 X10^3/UL (ref 1.8–6.8)
SEGS% (MANUAL): 66 % (ref 42–75)

## 2018-10-05 RX ADMIN — SODIUM CHLORIDE, PRESERVATIVE FREE SCH ML: 5 INJECTION INTRAVENOUS at 20:03

## 2018-10-05 RX ADMIN — B-COMPLEX W/ C & FOLIC ACID TAB SCH TAB: TAB at 08:32

## 2018-10-05 RX ADMIN — ONDANSETRON PRN MG: 4 TABLET, ORALLY DISINTEGRATING ORAL at 15:40

## 2018-10-05 RX ADMIN — PANTOPRAZOLE SODIUM SCH MG: 40 TABLET, DELAYED RELEASE ORAL at 08:33

## 2018-10-05 RX ADMIN — ERTAPENEM SODIUM SCH MLS/HR: 1 INJECTION, POWDER, LYOPHILIZED, FOR SOLUTION INTRAMUSCULAR; INTRAVENOUS at 18:11

## 2018-10-05 RX ADMIN — OXYCODONE HYDROCHLORIDE PRN MG: 5 TABLET ORAL at 15:40

## 2018-10-05 RX ADMIN — ONDANSETRON PRN MG: 4 TABLET, ORALLY DISINTEGRATING ORAL at 18:11

## 2018-10-05 RX ADMIN — OXYCODONE HYDROCHLORIDE PRN MG: 5 TABLET ORAL at 10:00

## 2018-10-05 RX ADMIN — MAGNESIUM OXIDE SCH MG: 400 TABLET ORAL at 08:33

## 2018-10-05 RX ADMIN — ACYCLOVIR SCH MG: 800 TABLET ORAL at 15:40

## 2018-10-05 RX ADMIN — ALLOPURINOL SCH MG: 300 TABLET ORAL at 08:32

## 2018-10-05 RX ADMIN — PROMETHAZINE HYDROCHLORIDE PRN MG: 25 INJECTION INTRAMUSCULAR; INTRAVENOUS at 20:08

## 2018-10-05 RX ADMIN — SODIUM CHLORIDE, PRESERVATIVE FREE SCH ML: 5 INJECTION INTRAVENOUS at 08:34

## 2018-10-05 RX ADMIN — CHOLECALCIFEROL TAB 125 MCG (5000 UNIT) SCH UNIT: 125 TAB at 08:32

## 2018-10-05 RX ADMIN — OXYCODONE HYDROCHLORIDE PRN MG: 5 TABLET ORAL at 03:18

## 2018-10-05 RX ADMIN — DAPTOMYCIN SCH MLS/HR: 500 INJECTION, POWDER, LYOPHILIZED, FOR SOLUTION INTRAVENOUS at 17:16

## 2018-10-05 RX ADMIN — ONDANSETRON PRN MG: 4 TABLET, ORALLY DISINTEGRATING ORAL at 03:18

## 2018-10-05 RX ADMIN — HEPARIN SODIUM SCH UNITS: 5000 INJECTION, SOLUTION INTRAVENOUS; SUBCUTANEOUS at 21:14

## 2018-10-05 RX ADMIN — HEPARIN SODIUM SCH UNITS: 5000 INJECTION, SOLUTION INTRAVENOUS; SUBCUTANEOUS at 08:34

## 2018-10-05 RX ADMIN — LACTULOSE SCH GM: 10 SOLUTION ORAL at 21:17

## 2018-10-05 RX ADMIN — PANTOPRAZOLE SODIUM SCH MG: 40 TABLET, DELAYED RELEASE ORAL at 21:13

## 2018-10-05 RX ADMIN — LACTULOSE SCH GM: 10 SOLUTION ORAL at 08:33

## 2018-10-05 RX ADMIN — ONDANSETRON PRN MG: 4 TABLET, ORALLY DISINTEGRATING ORAL at 10:00

## 2018-10-05 RX ADMIN — ACYCLOVIR SCH MG: 800 TABLET ORAL at 08:32

## 2018-10-05 RX ADMIN — ACYCLOVIR SCH MG: 800 TABLET ORAL at 21:13

## 2018-10-06 VITALS — SYSTOLIC BLOOD PRESSURE: 107 MMHG | DIASTOLIC BLOOD PRESSURE: 51 MMHG

## 2018-10-06 VITALS — SYSTOLIC BLOOD PRESSURE: 108 MMHG | DIASTOLIC BLOOD PRESSURE: 70 MMHG

## 2018-10-06 VITALS — SYSTOLIC BLOOD PRESSURE: 105 MMHG | DIASTOLIC BLOOD PRESSURE: 65 MMHG

## 2018-10-06 VITALS — DIASTOLIC BLOOD PRESSURE: 63 MMHG | SYSTOLIC BLOOD PRESSURE: 114 MMHG

## 2018-10-06 LAB
<PLATELET ESTIMATE>: ADEQUATE
<PLT MORPHOLOGY>: (no result)
ALBUMIN SERPL-MCNC: 2.4 G/DL (ref 3.4–5)
ALP SERPL-CCNC: 124 U/L (ref 45–117)
ALT SERPL-CCNC: 17 U/L (ref 12–78)
ANION GAP SERPL CALC-SCNC: 6 MMOL/L (ref 5–15)
ANISOCYTOSIS BLD QL SMEAR: (no result)
BAND#(MANUAL): 0.04 X10^3/UL
BILIRUB SERPL-MCNC: 0.2 MG/DL (ref 0.2–1)
CALCIUM SERPL-MCNC: 8.4 MG/DL (ref 8.5–10.1)
CHLORIDE SERPL-SCNC: 111 MMOL/L (ref 98–107)
CREAT SERPL-MCNC: 0.75 MG/DL (ref 0.55–1.02)
EOS#(MANUAL): 0.02 X10^3/UL (ref 0–0.4)
EOS% (MANUAL): 1 % (ref 1–7)
ERYTHROCYTE [DISTWIDTH] IN BLOOD BY AUTOMATED COUNT: 15.8 % (ref 9.6–15.2)
LYMPH#(MANUAL): 0.4 X10^3/UL (ref 1–3.4)
LYMPHS% (MANUAL): 18 % (ref 22–44)
MCH RBC QN AUTO: 33.2 PG (ref 27–34.8)
MCHC RBC AUTO-ENTMCNC: 34.2 G/DL (ref 32.4–35.8)
MCV RBC AUTO: 97 FL (ref 80–100)
MD: YES
METAMYELOCYTES# (MANUAL): 0.15 X10^3/UL (ref 0–0)
METAMYELOCYTES% (MANUAL): 7 % (ref 0–1)
MONOS#(MANUAL): 0.18 X10^3/UL (ref 0.3–2.7)
MONOS% (MANUAL): 8 % (ref 2–9)
MYELOCYTES# (MANUAL): 0.09 X10^3/UL (ref 0–0)
MYELOCYTES% (MANUAL): 4 % (ref 0–0)
NEUTS BAND NFR BLD: 2 % (ref 0–7)
NRBC % (MANUAL): 1 % (ref 0–1)
PLATELET # BLD AUTO: 292 X10^3/UL (ref 130–400)
PMV BLD AUTO: 7.2 FL (ref 7.4–10.4)
POLYCHROMASIA BLD QL SMEAR: (no result)
PROT SERPL-MCNC: 5.2 G/DL (ref 6.4–8.2)
RBC # BLD AUTO: 2.87 X10^6/UL (ref 3.82–5.3)
REACTIVE LYMPHS # (MANUAL): 0.02 X10^3/UL (ref 0–0)
REACTIVE LYMPHS % (MANUAL): 1 % (ref 0–0)
SEG#(MANUAL): 1.3 X10^3/UL (ref 1.8–6.8)
SEGS% (MANUAL): 59 % (ref 42–75)

## 2018-10-06 RX ADMIN — ACYCLOVIR SCH MG: 800 TABLET ORAL at 20:29

## 2018-10-06 RX ADMIN — ONDANSETRON PRN MG: 4 TABLET, ORALLY DISINTEGRATING ORAL at 21:39

## 2018-10-06 RX ADMIN — ONDANSETRON PRN MG: 4 TABLET, ORALLY DISINTEGRATING ORAL at 17:17

## 2018-10-06 RX ADMIN — HEPARIN SODIUM SCH UNITS: 5000 INJECTION, SOLUTION INTRAVENOUS; SUBCUTANEOUS at 20:30

## 2018-10-06 RX ADMIN — OXYCODONE HYDROCHLORIDE PRN MG: 5 TABLET ORAL at 12:10

## 2018-10-06 RX ADMIN — OXYCODONE HYDROCHLORIDE PRN MG: 5 TABLET ORAL at 21:38

## 2018-10-06 RX ADMIN — LACTULOSE SCH GM: 10 SOLUTION ORAL at 09:00

## 2018-10-06 RX ADMIN — ALLOPURINOL SCH MG: 300 TABLET ORAL at 09:08

## 2018-10-06 RX ADMIN — PANTOPRAZOLE SODIUM SCH MG: 40 TABLET, DELAYED RELEASE ORAL at 09:07

## 2018-10-06 RX ADMIN — B-COMPLEX W/ C & FOLIC ACID TAB SCH TAB: TAB at 09:07

## 2018-10-06 RX ADMIN — DAPTOMYCIN SCH MLS/HR: 500 INJECTION, POWDER, LYOPHILIZED, FOR SOLUTION INTRAVENOUS at 20:28

## 2018-10-06 RX ADMIN — HEPARIN SODIUM SCH UNITS: 5000 INJECTION, SOLUTION INTRAVENOUS; SUBCUTANEOUS at 09:06

## 2018-10-06 RX ADMIN — CHOLECALCIFEROL TAB 125 MCG (5000 UNIT) SCH UNIT: 125 TAB at 09:08

## 2018-10-06 RX ADMIN — SODIUM CHLORIDE, PRESERVATIVE FREE SCH ML: 5 INJECTION INTRAVENOUS at 20:29

## 2018-10-06 RX ADMIN — MAGNESIUM OXIDE SCH MG: 400 TABLET ORAL at 09:07

## 2018-10-06 RX ADMIN — LACTULOSE SCH GM: 10 SOLUTION ORAL at 20:29

## 2018-10-06 RX ADMIN — OXYCODONE HYDROCHLORIDE PRN MG: 5 TABLET ORAL at 04:45

## 2018-10-06 RX ADMIN — ACYCLOVIR SCH MG: 800 TABLET ORAL at 15:12

## 2018-10-06 RX ADMIN — ACYCLOVIR SCH MG: 800 TABLET ORAL at 09:07

## 2018-10-06 RX ADMIN — PANTOPRAZOLE SODIUM SCH MG: 40 TABLET, DELAYED RELEASE ORAL at 20:29

## 2018-10-06 RX ADMIN — OXYCODONE HYDROCHLORIDE PRN MG: 5 TABLET ORAL at 17:17

## 2018-10-06 RX ADMIN — SODIUM CHLORIDE, PRESERVATIVE FREE SCH ML: 5 INJECTION INTRAVENOUS at 09:09

## 2018-10-06 RX ADMIN — ERTAPENEM SODIUM SCH MLS/HR: 1 INJECTION, POWDER, LYOPHILIZED, FOR SOLUTION INTRAMUSCULAR; INTRAVENOUS at 17:17

## 2018-10-06 RX ADMIN — ONDANSETRON PRN MG: 4 TABLET, ORALLY DISINTEGRATING ORAL at 04:45

## 2018-10-07 VITALS — DIASTOLIC BLOOD PRESSURE: 63 MMHG | SYSTOLIC BLOOD PRESSURE: 99 MMHG

## 2018-10-07 VITALS — DIASTOLIC BLOOD PRESSURE: 68 MMHG | SYSTOLIC BLOOD PRESSURE: 114 MMHG

## 2018-10-07 VITALS — SYSTOLIC BLOOD PRESSURE: 104 MMHG | DIASTOLIC BLOOD PRESSURE: 68 MMHG

## 2018-10-07 VITALS — SYSTOLIC BLOOD PRESSURE: 106 MMHG | DIASTOLIC BLOOD PRESSURE: 69 MMHG

## 2018-10-07 LAB
<PLATELET ESTIMATE>: ADEQUATE
<PLT MORPHOLOGY>: (no result)
ALBUMIN SERPL-MCNC: 2.4 G/DL (ref 3.4–5)
ALP SERPL-CCNC: 135 U/L (ref 45–117)
ALT SERPL-CCNC: 17 U/L (ref 12–78)
ANION GAP SERPL CALC-SCNC: 10 MMOL/L (ref 5–15)
ANISOCYTOSIS BLD QL SMEAR: (no result)
BAND#(MANUAL): 2.64 X10^3/UL
BILIRUB SERPL-MCNC: 0.2 MG/DL (ref 0.2–1)
CALCIUM SERPL-MCNC: 8.5 MG/DL (ref 8.5–10.1)
CHLORIDE SERPL-SCNC: 109 MMOL/L (ref 98–107)
CK SERPL-CCNC: 19 U/L (ref 26–192)
CREAT SERPL-MCNC: 0.84 MG/DL (ref 0.55–1.02)
CRP SERPL-MCNC: 0.86 MG/DL (ref 0.02–0.49)
ERYTHROCYTE [DISTWIDTH] IN BLOOD BY AUTOMATED COUNT: 16.7 % (ref 9.6–15.2)
ERYTHROCYTE [SEDIMENTATION RATE] IN BLOOD BY WESTERGREN METHOD: 49 MM/HR (ref 0–20)
HCT (SEDRATE): 30 % (ref 34.6–47.8)
LYMPH#(MANUAL): 0.17 X10^3/UL (ref 1–3.4)
LYMPHS% (MANUAL): 1 % (ref 22–44)
MCH RBC QN AUTO: 32.8 PG (ref 27–34.8)
MCHC RBC AUTO-ENTMCNC: 33.8 G/DL (ref 32.4–35.8)
MCV RBC AUTO: 97 FL (ref 80–100)
MD: YES
MONOS#(MANUAL): 0.66 X10^3/UL (ref 0.3–2.7)
MONOS% (MANUAL): 4 % (ref 2–9)
NEUTS BAND NFR BLD: 16 % (ref 0–7)
PLATELET # BLD AUTO: 322 X10^3/UL (ref 130–400)
PMV BLD AUTO: 7.2 FL (ref 7.4–10.4)
POLYCHROMASIA BLD QL SMEAR: (no result)
PROT SERPL-MCNC: 5.3 G/DL (ref 6.4–8.2)
RBC # BLD AUTO: 2.94 X10^6/UL (ref 3.82–5.3)
SEG#(MANUAL): 13.04 X10^3/UL (ref 1.8–6.8)
SEGS% (MANUAL): 79 % (ref 42–75)

## 2018-10-07 RX ADMIN — HEPARIN SODIUM SCH UNITS: 5000 INJECTION, SOLUTION INTRAVENOUS; SUBCUTANEOUS at 09:04

## 2018-10-07 RX ADMIN — ONDANSETRON PRN MG: 4 TABLET, ORALLY DISINTEGRATING ORAL at 19:17

## 2018-10-07 RX ADMIN — ACYCLOVIR SCH MG: 800 TABLET ORAL at 14:51

## 2018-10-07 RX ADMIN — B-COMPLEX W/ C & FOLIC ACID TAB SCH TAB: TAB at 09:20

## 2018-10-07 RX ADMIN — ACYCLOVIR SCH MG: 800 TABLET ORAL at 09:03

## 2018-10-07 RX ADMIN — ALLOPURINOL SCH MG: 300 TABLET ORAL at 09:03

## 2018-10-07 RX ADMIN — SODIUM CHLORIDE, PRESERVATIVE FREE SCH ML: 5 INJECTION INTRAVENOUS at 09:20

## 2018-10-07 RX ADMIN — SODIUM CHLORIDE, PRESERVATIVE FREE SCH ML: 5 INJECTION INTRAVENOUS at 20:02

## 2018-10-07 RX ADMIN — CHOLECALCIFEROL TAB 125 MCG (5000 UNIT) SCH UNIT: 125 TAB at 09:04

## 2018-10-07 RX ADMIN — OXYCODONE HYDROCHLORIDE PRN MG: 5 TABLET ORAL at 21:44

## 2018-10-07 RX ADMIN — ONDANSETRON PRN MG: 4 TABLET, ORALLY DISINTEGRATING ORAL at 01:53

## 2018-10-07 RX ADMIN — LACTULOSE SCH GM: 10 SOLUTION ORAL at 20:03

## 2018-10-07 RX ADMIN — OXYCODONE HYDROCHLORIDE PRN MG: 5 TABLET ORAL at 01:53

## 2018-10-07 RX ADMIN — DAPTOMYCIN SCH MLS/HR: 500 INJECTION, POWDER, LYOPHILIZED, FOR SOLUTION INTRAVENOUS at 20:02

## 2018-10-07 RX ADMIN — ERTAPENEM SODIUM SCH MLS/HR: 1 INJECTION, POWDER, LYOPHILIZED, FOR SOLUTION INTRAMUSCULAR; INTRAVENOUS at 16:29

## 2018-10-07 RX ADMIN — HEPARIN SODIUM SCH UNITS: 5000 INJECTION, SOLUTION INTRAVENOUS; SUBCUTANEOUS at 20:03

## 2018-10-07 RX ADMIN — ACYCLOVIR SCH MG: 800 TABLET ORAL at 20:02

## 2018-10-07 RX ADMIN — PANTOPRAZOLE SODIUM SCH MG: 40 TABLET, DELAYED RELEASE ORAL at 20:02

## 2018-10-07 RX ADMIN — PANTOPRAZOLE SODIUM SCH MG: 40 TABLET, DELAYED RELEASE ORAL at 09:03

## 2018-10-07 RX ADMIN — MAGNESIUM OXIDE SCH MG: 400 TABLET ORAL at 09:03

## 2018-10-07 RX ADMIN — PROMETHAZINE HYDROCHLORIDE PRN MG: 25 INJECTION INTRAMUSCULAR; INTRAVENOUS at 00:33

## 2018-10-07 RX ADMIN — LACTULOSE SCH GM: 10 SOLUTION ORAL at 09:00

## 2018-10-07 RX ADMIN — OXYCODONE HYDROCHLORIDE PRN MG: 5 TABLET ORAL at 14:51

## 2018-10-07 RX ADMIN — OXYCODONE HYDROCHLORIDE PRN MG: 5 TABLET ORAL at 17:23

## 2018-10-08 VITALS — DIASTOLIC BLOOD PRESSURE: 49 MMHG | SYSTOLIC BLOOD PRESSURE: 102 MMHG

## 2018-10-08 VITALS — DIASTOLIC BLOOD PRESSURE: 72 MMHG | SYSTOLIC BLOOD PRESSURE: 110 MMHG

## 2018-10-08 VITALS — SYSTOLIC BLOOD PRESSURE: 120 MMHG | DIASTOLIC BLOOD PRESSURE: 78 MMHG

## 2018-10-08 VITALS — SYSTOLIC BLOOD PRESSURE: 103 MMHG | DIASTOLIC BLOOD PRESSURE: 66 MMHG

## 2018-10-08 LAB
<PLATELET ESTIMATE>: ADEQUATE
<PLT MORPHOLOGY>: (no result)
ALBUMIN SERPL-MCNC: 2.4 G/DL (ref 3.4–5)
ALP SERPL-CCNC: 137 U/L (ref 45–117)
ALT SERPL-CCNC: 17 U/L (ref 12–78)
ANION GAP SERPL CALC-SCNC: 8 MMOL/L (ref 5–15)
ANISOCYTOSIS BLD QL SMEAR: (no result)
BAND#(MANUAL): 0.59 X10^3/UL
BILIRUB SERPL-MCNC: 0.2 MG/DL (ref 0.2–1)
CALCIUM SERPL-MCNC: 8.3 MG/DL (ref 8.5–10.1)
CHLORIDE SERPL-SCNC: 110 MMOL/L (ref 98–107)
CREAT SERPL-MCNC: 0.7 MG/DL (ref 0.55–1.02)
ERYTHROCYTE [DISTWIDTH] IN BLOOD BY AUTOMATED COUNT: 17.1 % (ref 9.6–15.2)
LYMPH#(MANUAL): 1.06 X10^3/UL (ref 1–3.4)
LYMPHS% (MANUAL): 9 % (ref 22–44)
MCH RBC QN AUTO: 33.5 PG (ref 27–34.8)
MCHC RBC AUTO-ENTMCNC: 34.4 G/DL (ref 32.4–35.8)
MCV RBC AUTO: 97.3 FL (ref 80–100)
MD: YES
METAMYELOCYTES# (MANUAL): 0.24 X10^3/UL (ref 0–0)
METAMYELOCYTES% (MANUAL): 2 % (ref 0–1)
MONOS#(MANUAL): 0.47 X10^3/UL (ref 0.3–2.7)
MONOS% (MANUAL): 4 % (ref 2–9)
NEUTS BAND NFR BLD: 5 % (ref 0–7)
PLATELET # BLD AUTO: 315 X10^3/UL (ref 130–400)
PMV BLD AUTO: 7.2 FL (ref 7.4–10.4)
POLYCHROMASIA BLD QL SMEAR: (no result)
PROT SERPL-MCNC: 5.3 G/DL (ref 6.4–8.2)
RBC # BLD AUTO: 2.84 X10^6/UL (ref 3.82–5.3)
SEG#(MANUAL): 9.44 X10^3/UL (ref 1.8–6.8)
SEGS% (MANUAL): 80 % (ref 42–75)

## 2018-10-08 RX ADMIN — ONDANSETRON PRN MG: 4 TABLET, ORALLY DISINTEGRATING ORAL at 20:06

## 2018-10-08 RX ADMIN — ONDANSETRON PRN MG: 4 TABLET, ORALLY DISINTEGRATING ORAL at 04:59

## 2018-10-08 RX ADMIN — CHOLECALCIFEROL TAB 125 MCG (5000 UNIT) SCH UNIT: 125 TAB at 10:35

## 2018-10-08 RX ADMIN — ACYCLOVIR SCH MG: 800 TABLET ORAL at 10:35

## 2018-10-08 RX ADMIN — MAGNESIUM OXIDE SCH MG: 400 TABLET ORAL at 10:36

## 2018-10-08 RX ADMIN — OXYCODONE HYDROCHLORIDE PRN MG: 5 TABLET ORAL at 10:37

## 2018-10-08 RX ADMIN — B-COMPLEX W/ C & FOLIC ACID TAB SCH TAB: TAB at 10:36

## 2018-10-08 RX ADMIN — ERTAPENEM SODIUM SCH MLS/HR: 1 INJECTION, POWDER, LYOPHILIZED, FOR SOLUTION INTRAMUSCULAR; INTRAVENOUS at 16:58

## 2018-10-08 RX ADMIN — HEPARIN SODIUM SCH UNITS: 5000 INJECTION, SOLUTION INTRAVENOUS; SUBCUTANEOUS at 10:36

## 2018-10-08 RX ADMIN — HEPARIN SODIUM SCH UNITS: 5000 INJECTION, SOLUTION INTRAVENOUS; SUBCUTANEOUS at 20:06

## 2018-10-08 RX ADMIN — OXYCODONE HYDROCHLORIDE PRN MG: 5 TABLET ORAL at 04:59

## 2018-10-08 RX ADMIN — LACTULOSE SCH GM: 10 SOLUTION ORAL at 20:05

## 2018-10-08 RX ADMIN — SODIUM CHLORIDE, PRESERVATIVE FREE SCH ML: 5 INJECTION INTRAVENOUS at 10:34

## 2018-10-08 RX ADMIN — DAPTOMYCIN SCH MLS/HR: 500 INJECTION, POWDER, LYOPHILIZED, FOR SOLUTION INTRAVENOUS at 20:05

## 2018-10-08 RX ADMIN — OXYCODONE HYDROCHLORIDE PRN MG: 5 TABLET ORAL at 20:06

## 2018-10-08 RX ADMIN — ACYCLOVIR SCH MG: 800 TABLET ORAL at 16:58

## 2018-10-08 RX ADMIN — ALLOPURINOL SCH MG: 300 TABLET ORAL at 10:35

## 2018-10-08 RX ADMIN — LACTULOSE SCH GM: 10 SOLUTION ORAL at 09:00

## 2018-10-08 RX ADMIN — ACYCLOVIR SCH MG: 800 TABLET ORAL at 20:06

## 2018-10-08 RX ADMIN — SODIUM CHLORIDE, PRESERVATIVE FREE SCH ML: 5 INJECTION INTRAVENOUS at 20:05

## 2018-10-08 RX ADMIN — PANTOPRAZOLE SODIUM SCH MG: 40 TABLET, DELAYED RELEASE ORAL at 10:35

## 2018-10-08 RX ADMIN — ONDANSETRON PRN MG: 4 TABLET, ORALLY DISINTEGRATING ORAL at 10:37

## 2018-10-09 VITALS — SYSTOLIC BLOOD PRESSURE: 107 MMHG | DIASTOLIC BLOOD PRESSURE: 69 MMHG

## 2018-10-09 VITALS — DIASTOLIC BLOOD PRESSURE: 63 MMHG | SYSTOLIC BLOOD PRESSURE: 110 MMHG

## 2018-10-09 VITALS — DIASTOLIC BLOOD PRESSURE: 70 MMHG | SYSTOLIC BLOOD PRESSURE: 105 MMHG

## 2018-10-09 VITALS — DIASTOLIC BLOOD PRESSURE: 63 MMHG | SYSTOLIC BLOOD PRESSURE: 99 MMHG

## 2018-10-09 LAB
ALBUMIN SERPL-MCNC: 2.4 G/DL (ref 3.4–5)
ALP SERPL-CCNC: 128 U/L (ref 45–117)
ALT SERPL-CCNC: 20 U/L (ref 12–78)
ANION GAP SERPL CALC-SCNC: 7 MMOL/L (ref 5–15)
BASOPHILS # BLD AUTO: 0.01 X10^3/UL (ref 0–0.1)
BASOPHILS NFR BLD AUTO: 0 % (ref 0–1)
BILIRUB SERPL-MCNC: 0.2 MG/DL (ref 0.2–1)
CALCIUM SERPL-MCNC: 8.4 MG/DL (ref 8.5–10.1)
CHLORIDE SERPL-SCNC: 111 MMOL/L (ref 98–107)
CREAT SERPL-MCNC: 0.73 MG/DL (ref 0.55–1.02)
EOSINOPHIL # BLD AUTO: 0.01 X10^3/UL (ref 0–0.4)
EOSINOPHIL NFR BLD AUTO: 0 % (ref 1–7)
ERYTHROCYTE [DISTWIDTH] IN BLOOD BY AUTOMATED COUNT: 16.2 % (ref 9.6–15.2)
LYMPHOCYTES # BLD AUTO: 0.48 X10^3/UL (ref 1–3.4)
LYMPHOCYTES NFR BLD AUTO: 8 % (ref 22–44)
MCH RBC QN AUTO: 33.9 PG (ref 27–34.8)
MCHC RBC AUTO-ENTMCNC: 34.8 G/DL (ref 32.4–35.8)
MCV RBC AUTO: 97.3 FL (ref 80–100)
MD: (no result)
MONOCYTES # BLD AUTO: 0.28 X10^3/UL (ref 0.2–0.8)
MONOCYTES NFR BLD AUTO: 5 % (ref 2–9)
NEUTROPHILS # BLD AUTO: 5.02 X10^3/UL (ref 1.8–6.8)
NEUTROPHILS NFR BLD AUTO: 87 % (ref 42–75)
PLATELET # BLD AUTO: 311 X10^3/UL (ref 130–400)
PMV BLD AUTO: 6.9 FL (ref 7.4–10.4)
PROT SERPL-MCNC: 5.1 G/DL (ref 6.4–8.2)
RBC # BLD AUTO: 2.8 X10^6/UL (ref 3.82–5.3)

## 2018-10-09 RX ADMIN — OXYCODONE HYDROCHLORIDE PRN MG: 5 TABLET ORAL at 16:49

## 2018-10-09 RX ADMIN — METHOCARBAMOL PRN MG: 750 TABLET ORAL at 21:47

## 2018-10-09 RX ADMIN — DAPTOMYCIN SCH MLS/HR: 500 INJECTION, POWDER, LYOPHILIZED, FOR SOLUTION INTRAVENOUS at 20:23

## 2018-10-09 RX ADMIN — HEPARIN SODIUM SCH UNITS: 5000 INJECTION, SOLUTION INTRAVENOUS; SUBCUTANEOUS at 20:24

## 2018-10-09 RX ADMIN — CHOLECALCIFEROL TAB 125 MCG (5000 UNIT) SCH UNIT: 125 TAB at 07:57

## 2018-10-09 RX ADMIN — ERTAPENEM SODIUM SCH MLS/HR: 1 INJECTION, POWDER, LYOPHILIZED, FOR SOLUTION INTRAMUSCULAR; INTRAVENOUS at 17:16

## 2018-10-09 RX ADMIN — PANTOPRAZOLE SODIUM SCH MG: 40 TABLET, DELAYED RELEASE ORAL at 07:58

## 2018-10-09 RX ADMIN — ACYCLOVIR SCH MG: 800 TABLET ORAL at 16:48

## 2018-10-09 RX ADMIN — MAGNESIUM OXIDE SCH MG: 400 TABLET ORAL at 07:57

## 2018-10-09 RX ADMIN — LACTULOSE SCH GM: 10 SOLUTION ORAL at 07:58

## 2018-10-09 RX ADMIN — SODIUM CHLORIDE, PRESERVATIVE FREE SCH ML: 5 INJECTION INTRAVENOUS at 20:24

## 2018-10-09 RX ADMIN — PANTOPRAZOLE SODIUM SCH MG: 40 TABLET, DELAYED RELEASE ORAL at 16:49

## 2018-10-09 RX ADMIN — LACTULOSE SCH GM: 10 SOLUTION ORAL at 20:24

## 2018-10-09 RX ADMIN — ACYCLOVIR SCH MG: 800 TABLET ORAL at 20:24

## 2018-10-09 RX ADMIN — B-COMPLEX W/ C & FOLIC ACID TAB SCH TAB: TAB at 07:57

## 2018-10-09 RX ADMIN — ONDANSETRON PRN MG: 4 TABLET, ORALLY DISINTEGRATING ORAL at 20:23

## 2018-10-09 RX ADMIN — ALLOPURINOL SCH MG: 300 TABLET ORAL at 07:57

## 2018-10-09 RX ADMIN — HEPARIN SODIUM SCH UNITS: 5000 INJECTION, SOLUTION INTRAVENOUS; SUBCUTANEOUS at 09:28

## 2018-10-09 RX ADMIN — ACYCLOVIR SCH MG: 800 TABLET ORAL at 07:57

## 2018-10-09 RX ADMIN — SODIUM CHLORIDE, PRESERVATIVE FREE SCH ML: 5 INJECTION INTRAVENOUS at 07:58

## 2018-10-10 VITALS — SYSTOLIC BLOOD PRESSURE: 104 MMHG | DIASTOLIC BLOOD PRESSURE: 67 MMHG

## 2018-10-10 VITALS — DIASTOLIC BLOOD PRESSURE: 69 MMHG | SYSTOLIC BLOOD PRESSURE: 110 MMHG

## 2018-10-10 VITALS — DIASTOLIC BLOOD PRESSURE: 82 MMHG | SYSTOLIC BLOOD PRESSURE: 149 MMHG

## 2018-10-10 VITALS — SYSTOLIC BLOOD PRESSURE: 119 MMHG | DIASTOLIC BLOOD PRESSURE: 73 MMHG

## 2018-10-10 LAB
ALBUMIN SERPL-MCNC: 2.3 G/DL (ref 3.4–5)
ALP SERPL-CCNC: 114 U/L (ref 45–117)
ALT SERPL-CCNC: 21 U/L (ref 12–78)
ANION GAP SERPL CALC-SCNC: 8 MMOL/L (ref 5–15)
BASOPHILS # BLD AUTO: 0 X10^3/UL (ref 0–0.1)
BASOPHILS NFR BLD AUTO: 0 % (ref 0–1)
BILIRUB SERPL-MCNC: 0.2 MG/DL (ref 0.2–1)
CALCIUM SERPL-MCNC: 8.2 MG/DL (ref 8.5–10.1)
CHLORIDE SERPL-SCNC: 112 MMOL/L (ref 98–107)
CREAT SERPL-MCNC: 0.59 MG/DL (ref 0.55–1.02)
EOSINOPHIL # BLD AUTO: 0 X10^3/UL (ref 0–0.4)
EOSINOPHIL NFR BLD AUTO: 0 % (ref 1–7)
ERYTHROCYTE [DISTWIDTH] IN BLOOD BY AUTOMATED COUNT: 17.6 % (ref 9.6–15.2)
LYMPHOCYTES # BLD AUTO: 0.42 X10^3/UL (ref 1–3.4)
LYMPHOCYTES NFR BLD AUTO: 5 % (ref 22–44)
MCH RBC QN AUTO: 33.5 PG (ref 27–34.8)
MCHC RBC AUTO-ENTMCNC: 34.1 G/DL (ref 32.4–35.8)
MCV RBC AUTO: 98.2 FL (ref 80–100)
MD: (no result)
MONOCYTES # BLD AUTO: 0.42 X10^3/UL (ref 0.2–0.8)
MONOCYTES NFR BLD AUTO: 6 % (ref 2–9)
NEUTROPHILS # BLD AUTO: 6.92 X10^3/UL (ref 1.8–6.8)
NEUTROPHILS NFR BLD AUTO: 89 % (ref 42–75)
PLATELET # BLD AUTO: 309 X10^3/UL (ref 130–400)
PMV BLD AUTO: 7 FL (ref 7.4–10.4)
PROT SERPL-MCNC: 5.1 G/DL (ref 6.4–8.2)
RBC # BLD AUTO: 2.73 X10^6/UL (ref 3.82–5.3)

## 2018-10-10 PROCEDURE — B01B1ZZ FLUOROSCOPY OF SPINAL CORD USING LOW OSMOLAR CONTRAST: ICD-10-PCS | Performed by: RADIOLOGY

## 2018-10-10 PROCEDURE — 3E0R305 INTRODUCTION OF OTHER ANTINEOPLASTIC INTO SPINAL CANAL, PERCUTANEOUS APPROACH: ICD-10-PCS | Performed by: RADIOLOGY

## 2018-10-10 PROCEDURE — 009U3ZZ DRAINAGE OF SPINAL CANAL, PERCUTANEOUS APPROACH: ICD-10-PCS | Performed by: RADIOLOGY

## 2018-10-10 RX ADMIN — LACTULOSE SCH GM: 10 SOLUTION ORAL at 07:37

## 2018-10-10 RX ADMIN — ONDANSETRON PRN MG: 4 TABLET, ORALLY DISINTEGRATING ORAL at 03:30

## 2018-10-10 RX ADMIN — ACYCLOVIR SCH MG: 800 TABLET ORAL at 21:36

## 2018-10-10 RX ADMIN — PANTOPRAZOLE SODIUM SCH MG: 40 TABLET, DELAYED RELEASE ORAL at 17:05

## 2018-10-10 RX ADMIN — PANTOPRAZOLE SODIUM SCH MG: 40 TABLET, DELAYED RELEASE ORAL at 07:39

## 2018-10-10 RX ADMIN — OXYCODONE HYDROCHLORIDE PRN MG: 5 TABLET ORAL at 14:50

## 2018-10-10 RX ADMIN — SODIUM CHLORIDE, PRESERVATIVE FREE SCH ML: 5 INJECTION INTRAVENOUS at 07:39

## 2018-10-10 RX ADMIN — ONDANSETRON PRN MG: 4 TABLET, ORALLY DISINTEGRATING ORAL at 17:06

## 2018-10-10 RX ADMIN — OXYCODONE HYDROCHLORIDE PRN MG: 5 TABLET ORAL at 03:30

## 2018-10-10 RX ADMIN — SODIUM CHLORIDE, PRESERVATIVE FREE SCH ML: 5 INJECTION INTRAVENOUS at 19:57

## 2018-10-10 RX ADMIN — DAPTOMYCIN SCH MLS/HR: 500 INJECTION, POWDER, LYOPHILIZED, FOR SOLUTION INTRAVENOUS at 19:56

## 2018-10-10 RX ADMIN — HYDROMORPHONE HYDROCHLORIDE PRN MG: 2 INJECTION INTRAMUSCULAR; INTRAVENOUS; SUBCUTANEOUS at 10:53

## 2018-10-10 RX ADMIN — ACYCLOVIR SCH MG: 800 TABLET ORAL at 17:05

## 2018-10-10 RX ADMIN — ONDANSETRON PRN MG: 4 TABLET, ORALLY DISINTEGRATING ORAL at 21:36

## 2018-10-10 RX ADMIN — PROMETHAZINE HYDROCHLORIDE PRN MG: 25 INJECTION INTRAMUSCULAR; INTRAVENOUS at 19:57

## 2018-10-10 RX ADMIN — ERTAPENEM SODIUM SCH MLS/HR: 1 INJECTION, POWDER, LYOPHILIZED, FOR SOLUTION INTRAMUSCULAR; INTRAVENOUS at 17:05

## 2018-10-10 RX ADMIN — B-COMPLEX W/ C & FOLIC ACID TAB SCH TAB: TAB at 07:38

## 2018-10-10 RX ADMIN — ACYCLOVIR SCH MG: 800 TABLET ORAL at 07:39

## 2018-10-10 RX ADMIN — ONDANSETRON PRN MG: 2 INJECTION, SOLUTION INTRAMUSCULAR; INTRAVENOUS at 10:53

## 2018-10-10 RX ADMIN — ALLOPURINOL SCH MG: 300 TABLET ORAL at 07:39

## 2018-10-10 RX ADMIN — MAGNESIUM OXIDE SCH MG: 400 TABLET ORAL at 07:39

## 2018-10-10 RX ADMIN — CHOLECALCIFEROL TAB 125 MCG (5000 UNIT) SCH UNIT: 125 TAB at 07:57

## 2018-10-10 RX ADMIN — HEPARIN SODIUM SCH UNITS: 5000 INJECTION, SOLUTION INTRAVENOUS; SUBCUTANEOUS at 07:37

## 2018-10-10 RX ADMIN — LACTULOSE SCH GM: 10 SOLUTION ORAL at 21:37

## 2018-10-10 RX ADMIN — HEPARIN SODIUM SCH UNITS: 5000 INJECTION, SOLUTION INTRAVENOUS; SUBCUTANEOUS at 21:45

## 2018-10-11 VITALS — SYSTOLIC BLOOD PRESSURE: 110 MMHG | DIASTOLIC BLOOD PRESSURE: 66 MMHG

## 2018-10-11 VITALS — DIASTOLIC BLOOD PRESSURE: 62 MMHG | SYSTOLIC BLOOD PRESSURE: 101 MMHG

## 2018-10-11 VITALS — DIASTOLIC BLOOD PRESSURE: 61 MMHG | SYSTOLIC BLOOD PRESSURE: 104 MMHG

## 2018-10-11 VITALS — SYSTOLIC BLOOD PRESSURE: 162 MMHG | DIASTOLIC BLOOD PRESSURE: 85 MMHG

## 2018-10-11 LAB
<PLATELET ESTIMATE>: ADEQUATE
<PLT MORPHOLOGY>: (no result)
ALBUMIN SERPL-MCNC: 2.6 G/DL (ref 3.4–5)
ALP SERPL-CCNC: 122 U/L (ref 45–117)
ALT SERPL-CCNC: 26 U/L (ref 12–78)
ANION GAP SERPL CALC-SCNC: 5 MMOL/L (ref 5–15)
ANISOCYTOSIS BLD QL SMEAR: (no result)
BAND#(MANUAL): 0.27 X10^3/UL
BILIRUB SERPL-MCNC: 0.3 MG/DL (ref 0.2–1)
CALCIUM SERPL-MCNC: 8.5 MG/DL (ref 8.5–10.1)
CHLORIDE SERPL-SCNC: 113 MMOL/L (ref 98–107)
CREAT SERPL-MCNC: 0.55 MG/DL (ref 0.55–1.02)
ERYTHROCYTE [DISTWIDTH] IN BLOOD BY AUTOMATED COUNT: 17.5 % (ref 9.6–15.2)
LYMPH#(MANUAL): 0.38 X10^3/UL (ref 1–3.4)
LYMPHS% (MANUAL): 10 % (ref 22–44)
MCH RBC QN AUTO: 33.7 PG (ref 27–34.8)
MCHC RBC AUTO-ENTMCNC: 34.5 G/DL (ref 32.4–35.8)
MCV RBC AUTO: 97.9 FL (ref 80–100)
MD: YES
MONOS#(MANUAL): 0.27 X10^3/UL (ref 0.3–2.7)
MONOS% (MANUAL): 7 % (ref 2–9)
NEUTS BAND NFR BLD: 7 % (ref 0–7)
OVALOCYTES BLD QL SMEAR: (no result)
PLATELET # BLD AUTO: 310 X10^3/UL (ref 130–400)
PMV BLD AUTO: 7.2 FL (ref 7.4–10.4)
POLYCHROMASIA BLD QL SMEAR: (no result)
PROT SERPL-MCNC: 5.8 G/DL (ref 6.4–8.2)
RBC # BLD AUTO: 2.88 X10^6/UL (ref 3.82–5.3)
SEG#(MANUAL): 2.89 X10^3/UL (ref 1.8–6.8)
SEGS% (MANUAL): 76 % (ref 42–75)

## 2018-10-11 RX ADMIN — ONDANSETRON PRN MG: 4 TABLET, ORALLY DISINTEGRATING ORAL at 10:01

## 2018-10-11 RX ADMIN — ONDANSETRON PRN MG: 4 TABLET, ORALLY DISINTEGRATING ORAL at 21:45

## 2018-10-11 RX ADMIN — PANTOPRAZOLE SODIUM SCH MG: 40 TABLET, DELAYED RELEASE ORAL at 17:35

## 2018-10-11 RX ADMIN — HEPARIN SODIUM SCH UNITS: 5000 INJECTION, SOLUTION INTRAVENOUS; SUBCUTANEOUS at 10:02

## 2018-10-11 RX ADMIN — DAPTOMYCIN SCH MLS/HR: 500 INJECTION, POWDER, LYOPHILIZED, FOR SOLUTION INTRAVENOUS at 19:26

## 2018-10-11 RX ADMIN — OXYCODONE HYDROCHLORIDE PRN MG: 5 TABLET ORAL at 15:33

## 2018-10-11 RX ADMIN — LACTULOSE SCH GM: 10 SOLUTION ORAL at 09:00

## 2018-10-11 RX ADMIN — ERTAPENEM SODIUM SCH MLS/HR: 1 INJECTION, POWDER, LYOPHILIZED, FOR SOLUTION INTRAMUSCULAR; INTRAVENOUS at 17:35

## 2018-10-11 RX ADMIN — LACTULOSE SCH GM: 10 SOLUTION ORAL at 20:50

## 2018-10-11 RX ADMIN — CHOLECALCIFEROL TAB 125 MCG (5000 UNIT) SCH UNIT: 125 TAB at 10:02

## 2018-10-11 RX ADMIN — ALLOPURINOL SCH MG: 300 TABLET ORAL at 10:01

## 2018-10-11 RX ADMIN — ACYCLOVIR SCH MG: 800 TABLET ORAL at 15:33

## 2018-10-11 RX ADMIN — SODIUM CHLORIDE, PRESERVATIVE FREE SCH ML: 5 INJECTION INTRAVENOUS at 10:03

## 2018-10-11 RX ADMIN — HEPARIN SODIUM SCH UNITS: 5000 INJECTION, SOLUTION INTRAVENOUS; SUBCUTANEOUS at 20:51

## 2018-10-11 RX ADMIN — OXYCODONE HYDROCHLORIDE PRN MG: 5 TABLET ORAL at 21:45

## 2018-10-11 RX ADMIN — SODIUM CHLORIDE, PRESERVATIVE FREE SCH ML: 5 INJECTION INTRAVENOUS at 19:26

## 2018-10-11 RX ADMIN — ONDANSETRON PRN MG: 4 TABLET, ORALLY DISINTEGRATING ORAL at 15:33

## 2018-10-11 RX ADMIN — MAGNESIUM OXIDE SCH MG: 400 TABLET ORAL at 10:02

## 2018-10-11 RX ADMIN — ACYCLOVIR SCH MG: 800 TABLET ORAL at 10:01

## 2018-10-11 RX ADMIN — PANTOPRAZOLE SODIUM SCH MG: 40 TABLET, DELAYED RELEASE ORAL at 07:56

## 2018-10-11 RX ADMIN — ACYCLOVIR SCH MG: 800 TABLET ORAL at 20:51

## 2018-10-11 RX ADMIN — B-COMPLEX W/ C & FOLIC ACID TAB SCH TAB: TAB at 10:01

## 2018-10-12 VITALS — SYSTOLIC BLOOD PRESSURE: 113 MMHG | DIASTOLIC BLOOD PRESSURE: 72 MMHG

## 2018-10-12 VITALS — SYSTOLIC BLOOD PRESSURE: 108 MMHG | DIASTOLIC BLOOD PRESSURE: 61 MMHG

## 2018-10-12 VITALS — SYSTOLIC BLOOD PRESSURE: 109 MMHG | DIASTOLIC BLOOD PRESSURE: 75 MMHG

## 2018-10-12 VITALS — SYSTOLIC BLOOD PRESSURE: 104 MMHG | DIASTOLIC BLOOD PRESSURE: 67 MMHG

## 2018-10-12 LAB
<PLATELET ESTIMATE>: ADEQUATE
<PLT MORPHOLOGY>: (no result)
ALBUMIN SERPL-MCNC: 2.6 G/DL (ref 3.4–5)
ALP SERPL-CCNC: 102 U/L (ref 45–117)
ALT SERPL-CCNC: 23 U/L (ref 12–78)
ANION GAP SERPL CALC-SCNC: 8 MMOL/L (ref 5–15)
ANISOCYTOSIS BLD QL SMEAR: (no result)
BILIRUB SERPL-MCNC: 0.4 MG/DL (ref 0.2–1)
CALCIUM SERPL-MCNC: 8.4 MG/DL (ref 8.5–10.1)
CHLORIDE SERPL-SCNC: 111 MMOL/L (ref 98–107)
CREAT SERPL-MCNC: 0.5 MG/DL (ref 0.55–1.02)
ERYTHROCYTE [DISTWIDTH] IN BLOOD BY AUTOMATED COUNT: 18.4 % (ref 9.6–15.2)
LYMPH#(MANUAL): 0.06 X10^3/UL (ref 1–3.4)
LYMPHS% (MANUAL): 2 % (ref 22–44)
MCH RBC QN AUTO: 33.5 PG (ref 27–34.8)
MCHC RBC AUTO-ENTMCNC: 34.6 G/DL (ref 32.4–35.8)
MCV RBC AUTO: 97.1 FL (ref 80–100)
MD: YES
OVALOCYTES BLD QL SMEAR: (no result)
PLATELET # BLD AUTO: 282 X10^3/UL (ref 130–400)
PMV BLD AUTO: 7.2 FL (ref 7.4–10.4)
POLYCHROMASIA BLD QL SMEAR: (no result)
PROT SERPL-MCNC: 5.4 G/DL (ref 6.4–8.2)
RBC # BLD AUTO: 2.72 X10^6/UL (ref 3.82–5.3)
SEG#(MANUAL): 2.74 X10^3/UL (ref 1.8–6.8)
SEGS% (MANUAL): 98 % (ref 42–75)

## 2018-10-12 RX ADMIN — ACYCLOVIR SCH MG: 800 TABLET ORAL at 15:17

## 2018-10-12 RX ADMIN — B-COMPLEX W/ C & FOLIC ACID TAB SCH TAB: TAB at 10:00

## 2018-10-12 RX ADMIN — HEPARIN SODIUM SCH UNITS: 5000 INJECTION, SOLUTION INTRAVENOUS; SUBCUTANEOUS at 09:22

## 2018-10-12 RX ADMIN — ACYCLOVIR SCH MG: 800 TABLET ORAL at 21:35

## 2018-10-12 RX ADMIN — OXYCODONE HYDROCHLORIDE PRN MG: 5 TABLET ORAL at 21:35

## 2018-10-12 RX ADMIN — ONDANSETRON PRN MG: 4 TABLET, ORALLY DISINTEGRATING ORAL at 15:17

## 2018-10-12 RX ADMIN — OXYCODONE HYDROCHLORIDE PRN MG: 5 TABLET ORAL at 15:18

## 2018-10-12 RX ADMIN — ERTAPENEM SODIUM SCH MLS/HR: 1 INJECTION, POWDER, LYOPHILIZED, FOR SOLUTION INTRAMUSCULAR; INTRAVENOUS at 17:29

## 2018-10-12 RX ADMIN — SODIUM CHLORIDE, PRESERVATIVE FREE SCH ML: 5 INJECTION INTRAVENOUS at 09:36

## 2018-10-12 RX ADMIN — PANTOPRAZOLE SODIUM SCH MG: 40 TABLET, DELAYED RELEASE ORAL at 17:29

## 2018-10-12 RX ADMIN — LACTULOSE SCH GM: 10 SOLUTION ORAL at 21:00

## 2018-10-12 RX ADMIN — MAGNESIUM OXIDE SCH MG: 400 TABLET ORAL at 09:58

## 2018-10-12 RX ADMIN — PANTOPRAZOLE SODIUM SCH MG: 40 TABLET, DELAYED RELEASE ORAL at 07:50

## 2018-10-12 RX ADMIN — DAPTOMYCIN SCH MLS/HR: 500 INJECTION, POWDER, LYOPHILIZED, FOR SOLUTION INTRAVENOUS at 19:51

## 2018-10-12 RX ADMIN — SODIUM CHLORIDE, PRESERVATIVE FREE SCH ML: 5 INJECTION INTRAVENOUS at 21:00

## 2018-10-12 RX ADMIN — ONDANSETRON PRN MG: 4 TABLET, ORALLY DISINTEGRATING ORAL at 09:22

## 2018-10-12 RX ADMIN — LACTULOSE SCH GM: 10 SOLUTION ORAL at 09:00

## 2018-10-12 RX ADMIN — OXYCODONE HYDROCHLORIDE PRN MG: 5 TABLET ORAL at 09:23

## 2018-10-12 RX ADMIN — ACYCLOVIR SCH MG: 800 TABLET ORAL at 10:03

## 2018-10-12 RX ADMIN — CHOLECALCIFEROL TAB 125 MCG (5000 UNIT) SCH UNIT: 125 TAB at 10:03

## 2018-10-12 RX ADMIN — ONDANSETRON PRN MG: 4 TABLET, ORALLY DISINTEGRATING ORAL at 21:35

## 2018-10-12 RX ADMIN — HEPARIN SODIUM SCH UNITS: 5000 INJECTION, SOLUTION INTRAVENOUS; SUBCUTANEOUS at 21:35

## 2018-10-12 RX ADMIN — ALLOPURINOL SCH MG: 300 TABLET ORAL at 10:05

## 2018-10-13 VITALS — DIASTOLIC BLOOD PRESSURE: 54 MMHG | SYSTOLIC BLOOD PRESSURE: 109 MMHG

## 2018-10-13 VITALS — SYSTOLIC BLOOD PRESSURE: 113 MMHG | DIASTOLIC BLOOD PRESSURE: 63 MMHG

## 2018-10-13 VITALS — SYSTOLIC BLOOD PRESSURE: 104 MMHG | DIASTOLIC BLOOD PRESSURE: 68 MMHG

## 2018-10-13 VITALS — SYSTOLIC BLOOD PRESSURE: 102 MMHG | DIASTOLIC BLOOD PRESSURE: 63 MMHG

## 2018-10-13 LAB
<PLATELET ESTIMATE>: ADEQUATE
<PLT MORPHOLOGY>: (no result)
ALBUMIN SERPL-MCNC: 2.6 G/DL (ref 3.4–5)
ALP SERPL-CCNC: 95 U/L (ref 45–117)
ALT SERPL-CCNC: 24 U/L (ref 12–78)
ANION GAP SERPL CALC-SCNC: 8 MMOL/L (ref 5–15)
ANISOCYTOSIS BLD QL SMEAR: (no result)
BAND#(MANUAL): 0.08 X10^3/UL
BASOPHILS NFR BLD MANUAL: 1 % (ref 0–1)
BASOS#(MANUAL): 0.02 X10^3/UL (ref 0–0.1)
BILIRUB SERPL-MCNC: 0.4 MG/DL (ref 0.2–1)
CALCIUM SERPL-MCNC: 8.6 MG/DL (ref 8.5–10.1)
CHLORIDE SERPL-SCNC: 110 MMOL/L (ref 98–107)
CREAT SERPL-MCNC: 0.51 MG/DL (ref 0.55–1.02)
ERYTHROCYTE [DISTWIDTH] IN BLOOD BY AUTOMATED COUNT: 18.8 % (ref 9.6–15.2)
LYMPH#(MANUAL): 0.3 X10^3/UL (ref 1–3.4)
LYMPHS% (MANUAL): 16 % (ref 22–44)
MCH RBC QN AUTO: 33.5 PG (ref 27–34.8)
MCHC RBC AUTO-ENTMCNC: 34.5 G/DL (ref 32.4–35.8)
MCV RBC AUTO: 97 FL (ref 80–100)
MD: YES
MONOS#(MANUAL): 0.02 X10^3/UL (ref 0.3–2.7)
MONOS% (MANUAL): 1 % (ref 2–9)
NEUTS BAND NFR BLD: 4 % (ref 0–7)
PLATELET # BLD AUTO: 286 X10^3/UL (ref 130–400)
PMV BLD AUTO: 7.5 FL (ref 7.4–10.4)
POLYCHROMASIA BLD QL SMEAR: (no result)
PROT SERPL-MCNC: 5.3 G/DL (ref 6.4–8.2)
RBC # BLD AUTO: 2.64 X10^6/UL (ref 3.82–5.3)
SEG#(MANUAL): 1.48 X10^3/UL (ref 1.8–6.8)
SEGS% (MANUAL): 78 % (ref 42–75)

## 2018-10-13 RX ADMIN — SODIUM CHLORIDE, PRESERVATIVE FREE SCH ML: 5 INJECTION INTRAVENOUS at 21:00

## 2018-10-13 RX ADMIN — ONDANSETRON PRN MG: 4 TABLET, ORALLY DISINTEGRATING ORAL at 07:59

## 2018-10-13 RX ADMIN — DAPTOMYCIN SCH MLS/HR: 500 INJECTION, POWDER, LYOPHILIZED, FOR SOLUTION INTRAVENOUS at 20:25

## 2018-10-13 RX ADMIN — ACYCLOVIR SCH MG: 800 TABLET ORAL at 07:59

## 2018-10-13 RX ADMIN — LACTULOSE SCH GM: 10 SOLUTION ORAL at 21:00

## 2018-10-13 RX ADMIN — ACYCLOVIR SCH MG: 800 TABLET ORAL at 21:31

## 2018-10-13 RX ADMIN — OXYCODONE HYDROCHLORIDE PRN MG: 5 TABLET ORAL at 21:31

## 2018-10-13 RX ADMIN — MAGNESIUM OXIDE SCH MG: 400 TABLET ORAL at 07:58

## 2018-10-13 RX ADMIN — HEPARIN SODIUM SCH UNITS: 5000 INJECTION, SOLUTION INTRAVENOUS; SUBCUTANEOUS at 21:31

## 2018-10-13 RX ADMIN — HEPARIN SODIUM SCH UNITS: 5000 INJECTION, SOLUTION INTRAVENOUS; SUBCUTANEOUS at 07:59

## 2018-10-13 RX ADMIN — PANTOPRAZOLE SODIUM SCH MG: 40 TABLET, DELAYED RELEASE ORAL at 17:37

## 2018-10-13 RX ADMIN — SODIUM CHLORIDE, PRESERVATIVE FREE SCH ML: 5 INJECTION INTRAVENOUS at 07:58

## 2018-10-13 RX ADMIN — ALLOPURINOL SCH MG: 300 TABLET ORAL at 07:59

## 2018-10-13 RX ADMIN — OXYCODONE HYDROCHLORIDE PRN MG: 5 TABLET ORAL at 07:59

## 2018-10-13 RX ADMIN — ONDANSETRON PRN MG: 4 TABLET, ORALLY DISINTEGRATING ORAL at 21:30

## 2018-10-13 RX ADMIN — PANTOPRAZOLE SODIUM SCH MG: 40 TABLET, DELAYED RELEASE ORAL at 07:57

## 2018-10-13 RX ADMIN — ERTAPENEM SODIUM SCH MLS/HR: 1 INJECTION, POWDER, LYOPHILIZED, FOR SOLUTION INTRAMUSCULAR; INTRAVENOUS at 17:37

## 2018-10-13 RX ADMIN — CHOLECALCIFEROL TAB 125 MCG (5000 UNIT) SCH UNIT: 125 TAB at 07:59

## 2018-10-13 RX ADMIN — LACTULOSE SCH GM: 10 SOLUTION ORAL at 09:00

## 2018-10-13 RX ADMIN — B-COMPLEX W/ C & FOLIC ACID TAB SCH TAB: TAB at 07:59

## 2018-10-13 RX ADMIN — ACYCLOVIR SCH MG: 800 TABLET ORAL at 16:48

## 2018-10-14 VITALS — DIASTOLIC BLOOD PRESSURE: 56 MMHG | SYSTOLIC BLOOD PRESSURE: 101 MMHG

## 2018-10-14 VITALS — DIASTOLIC BLOOD PRESSURE: 67 MMHG | SYSTOLIC BLOOD PRESSURE: 104 MMHG

## 2018-10-14 VITALS — DIASTOLIC BLOOD PRESSURE: 56 MMHG | SYSTOLIC BLOOD PRESSURE: 102 MMHG

## 2018-10-14 VITALS — SYSTOLIC BLOOD PRESSURE: 96 MMHG | DIASTOLIC BLOOD PRESSURE: 61 MMHG

## 2018-10-14 VITALS — SYSTOLIC BLOOD PRESSURE: 110 MMHG | DIASTOLIC BLOOD PRESSURE: 73 MMHG

## 2018-10-14 LAB
ALBUMIN SERPL-MCNC: 2.9 G/DL (ref 3.4–5)
ALP SERPL-CCNC: 98 U/L (ref 45–117)
ALT SERPL-CCNC: 25 U/L (ref 12–78)
ANION GAP SERPL CALC-SCNC: 5 MMOL/L (ref 5–15)
BASOPHILS # BLD AUTO: 0.01 X10^3/UL (ref 0–0.1)
BASOPHILS NFR BLD AUTO: 1 % (ref 0–1)
BILIRUB SERPL-MCNC: 0.2 MG/DL (ref 0.2–1)
CALCIUM SERPL-MCNC: 8.4 MG/DL (ref 8.5–10.1)
CHLORIDE SERPL-SCNC: 112 MMOL/L (ref 98–107)
CREAT SERPL-MCNC: 0.51 MG/DL (ref 0.55–1.02)
EOSINOPHIL # BLD AUTO: 0 X10^3/UL (ref 0–0.4)
EOSINOPHIL NFR BLD AUTO: 0 % (ref 1–7)
ERYTHROCYTE [DISTWIDTH] IN BLOOD BY AUTOMATED COUNT: 18.5 % (ref 9.6–15.2)
LYMPHOCYTES # BLD AUTO: 0.39 X10^3/UL (ref 1–3.4)
LYMPHOCYTES NFR BLD AUTO: 19 % (ref 22–44)
MCH RBC QN AUTO: 33.4 PG (ref 27–34.8)
MCHC RBC AUTO-ENTMCNC: 34.5 G/DL (ref 32.4–35.8)
MCV RBC AUTO: 96.8 FL (ref 80–100)
MD: (no result)
MONOCYTES # BLD AUTO: 0.01 X10^3/UL (ref 0.2–0.8)
MONOCYTES NFR BLD AUTO: 0 % (ref 2–9)
NEUTROPHILS # BLD AUTO: 1.63 X10^3/UL (ref 1.8–6.8)
NEUTROPHILS NFR BLD AUTO: 80 % (ref 42–75)
PLATELET # BLD AUTO: 281 X10^3/UL (ref 130–400)
PMV BLD AUTO: 7.5 FL (ref 7.4–10.4)
PROT SERPL-MCNC: 5.6 G/DL (ref 6.4–8.2)
RBC # BLD AUTO: 2.69 X10^6/UL (ref 3.82–5.3)

## 2018-10-14 RX ADMIN — LACTULOSE SCH GM: 10 SOLUTION ORAL at 09:00

## 2018-10-14 RX ADMIN — ACYCLOVIR SCH MG: 800 TABLET ORAL at 17:03

## 2018-10-14 RX ADMIN — OXYCODONE HYDROCHLORIDE PRN MG: 5 TABLET ORAL at 17:04

## 2018-10-14 RX ADMIN — ACYCLOVIR SCH MG: 800 TABLET ORAL at 09:09

## 2018-10-14 RX ADMIN — ONDANSETRON PRN MG: 4 TABLET, ORALLY DISINTEGRATING ORAL at 11:07

## 2018-10-14 RX ADMIN — LACTULOSE SCH GM: 10 SOLUTION ORAL at 21:00

## 2018-10-14 RX ADMIN — ONDANSETRON PRN MG: 4 TABLET, ORALLY DISINTEGRATING ORAL at 17:03

## 2018-10-14 RX ADMIN — MAGNESIUM OXIDE SCH MG: 400 TABLET ORAL at 09:09

## 2018-10-14 RX ADMIN — ERTAPENEM SODIUM SCH MLS/HR: 1 INJECTION, POWDER, LYOPHILIZED, FOR SOLUTION INTRAMUSCULAR; INTRAVENOUS at 19:24

## 2018-10-14 RX ADMIN — PANTOPRAZOLE SODIUM SCH MG: 40 TABLET, DELAYED RELEASE ORAL at 09:09

## 2018-10-14 RX ADMIN — DAPTOMYCIN SCH MLS/HR: 500 INJECTION, POWDER, LYOPHILIZED, FOR SOLUTION INTRAVENOUS at 20:14

## 2018-10-14 RX ADMIN — OXYCODONE HYDROCHLORIDE PRN MG: 5 TABLET ORAL at 09:08

## 2018-10-14 RX ADMIN — HEPARIN SODIUM SCH UNITS: 5000 INJECTION, SOLUTION INTRAVENOUS; SUBCUTANEOUS at 20:14

## 2018-10-14 RX ADMIN — PANTOPRAZOLE SODIUM SCH MG: 40 TABLET, DELAYED RELEASE ORAL at 17:04

## 2018-10-14 RX ADMIN — SODIUM CHLORIDE, PRESERVATIVE FREE SCH ML: 5 INJECTION INTRAVENOUS at 16:56

## 2018-10-14 RX ADMIN — HEPARIN SODIUM SCH UNITS: 5000 INJECTION, SOLUTION INTRAVENOUS; SUBCUTANEOUS at 09:09

## 2018-10-14 RX ADMIN — CHOLECALCIFEROL TAB 125 MCG (5000 UNIT) SCH UNIT: 125 TAB at 09:09

## 2018-10-14 RX ADMIN — SODIUM CHLORIDE, PRESERVATIVE FREE SCH ML: 5 INJECTION INTRAVENOUS at 21:00

## 2018-10-14 RX ADMIN — PROMETHAZINE HYDROCHLORIDE PRN MG: 25 INJECTION INTRAMUSCULAR; INTRAVENOUS at 19:27

## 2018-10-14 RX ADMIN — B-COMPLEX W/ C & FOLIC ACID TAB SCH TAB: TAB at 11:07

## 2018-10-14 RX ADMIN — ALLOPURINOL SCH MG: 300 TABLET ORAL at 09:09

## 2018-10-15 VITALS — SYSTOLIC BLOOD PRESSURE: 110 MMHG | DIASTOLIC BLOOD PRESSURE: 73 MMHG

## 2018-10-15 VITALS — DIASTOLIC BLOOD PRESSURE: 62 MMHG | SYSTOLIC BLOOD PRESSURE: 114 MMHG

## 2018-10-15 VITALS — DIASTOLIC BLOOD PRESSURE: 62 MMHG | SYSTOLIC BLOOD PRESSURE: 105 MMHG

## 2018-10-15 VITALS — DIASTOLIC BLOOD PRESSURE: 58 MMHG | SYSTOLIC BLOOD PRESSURE: 101 MMHG

## 2018-10-15 LAB
ALBUMIN SERPL-MCNC: 2.7 G/DL (ref 3.4–5)
ALP SERPL-CCNC: 89 U/L (ref 45–117)
ALT SERPL-CCNC: 26 U/L (ref 12–78)
ANION GAP SERPL CALC-SCNC: 9 MMOL/L (ref 5–15)
BASOPHILS # BLD AUTO: 0 X10^3/UL (ref 0–0.1)
BASOPHILS NFR BLD AUTO: 0 % (ref 0–1)
BILIRUB SERPL-MCNC: 0.3 MG/DL (ref 0.2–1)
CALCIUM SERPL-MCNC: 8.1 MG/DL (ref 8.5–10.1)
CHLORIDE SERPL-SCNC: 112 MMOL/L (ref 98–107)
CREAT SERPL-MCNC: 0.51 MG/DL (ref 0.55–1.02)
EOSINOPHIL # BLD AUTO: 0.01 X10^3/UL (ref 0–0.4)
EOSINOPHIL NFR BLD AUTO: 1 % (ref 1–7)
ERYTHROCYTE [DISTWIDTH] IN BLOOD BY AUTOMATED COUNT: 17.9 % (ref 9.6–15.2)
LYMPHOCYTES # BLD AUTO: 0.4 X10^3/UL (ref 1–3.4)
LYMPHOCYTES NFR BLD AUTO: 30 % (ref 22–44)
MCH RBC QN AUTO: 33.5 PG (ref 27–34.8)
MCHC RBC AUTO-ENTMCNC: 34.7 G/DL (ref 32.4–35.8)
MCV RBC AUTO: 96.5 FL (ref 80–100)
MD: (no result)
MONOCYTES # BLD AUTO: 0.01 X10^3/UL (ref 0.2–0.8)
MONOCYTES NFR BLD AUTO: 1 % (ref 2–9)
NEUTROPHILS # BLD AUTO: 0.92 X10^3/UL (ref 1.8–6.8)
NEUTROPHILS NFR BLD AUTO: 68 % (ref 42–75)
PLATELET # BLD AUTO: 254 X10^3/UL (ref 130–400)
PMV BLD AUTO: 7.5 FL (ref 7.4–10.4)
PROT SERPL-MCNC: 5.1 G/DL (ref 6.4–8.2)
RBC # BLD AUTO: 2.63 X10^6/UL (ref 3.82–5.3)

## 2018-10-15 RX ADMIN — ONDANSETRON PRN MG: 4 TABLET, ORALLY DISINTEGRATING ORAL at 09:44

## 2018-10-15 RX ADMIN — LACTULOSE SCH GM: 10 SOLUTION ORAL at 21:00

## 2018-10-15 RX ADMIN — OXYCODONE HYDROCHLORIDE PRN MG: 5 TABLET ORAL at 18:49

## 2018-10-15 RX ADMIN — ACYCLOVIR SCH MG: 800 TABLET ORAL at 21:23

## 2018-10-15 RX ADMIN — TBO-FILGRASTIM SCH MCG: 480 INJECTION, SOLUTION SUBCUTANEOUS at 21:24

## 2018-10-15 RX ADMIN — OXYCODONE HYDROCHLORIDE PRN MG: 5 TABLET ORAL at 09:44

## 2018-10-15 RX ADMIN — ALLOPURINOL SCH MG: 300 TABLET ORAL at 09:50

## 2018-10-15 RX ADMIN — MAGNESIUM OXIDE SCH MG: 400 TABLET ORAL at 09:50

## 2018-10-15 RX ADMIN — HEPARIN SODIUM SCH UNITS: 5000 INJECTION, SOLUTION INTRAVENOUS; SUBCUTANEOUS at 09:48

## 2018-10-15 RX ADMIN — LACTULOSE SCH GM: 10 SOLUTION ORAL at 09:00

## 2018-10-15 RX ADMIN — ACYCLOVIR SCH MG: 800 TABLET ORAL at 00:31

## 2018-10-15 RX ADMIN — SODIUM CHLORIDE, PRESERVATIVE FREE SCH ML: 5 INJECTION INTRAVENOUS at 15:29

## 2018-10-15 RX ADMIN — CHOLECALCIFEROL TAB 125 MCG (5000 UNIT) SCH UNIT: 125 TAB at 09:51

## 2018-10-15 RX ADMIN — PANTOPRAZOLE SODIUM SCH MG: 40 TABLET, DELAYED RELEASE ORAL at 07:08

## 2018-10-15 RX ADMIN — ERTAPENEM SODIUM SCH MLS/HR: 1 INJECTION, POWDER, LYOPHILIZED, FOR SOLUTION INTRAMUSCULAR; INTRAVENOUS at 17:58

## 2018-10-15 RX ADMIN — OXYCODONE HYDROCHLORIDE PRN MG: 5 TABLET ORAL at 17:58

## 2018-10-15 RX ADMIN — ACYCLOVIR SCH MG: 800 TABLET ORAL at 17:58

## 2018-10-15 RX ADMIN — DAPTOMYCIN SCH MLS/HR: 500 INJECTION, POWDER, LYOPHILIZED, FOR SOLUTION INTRAVENOUS at 21:23

## 2018-10-15 RX ADMIN — PANTOPRAZOLE SODIUM SCH MG: 40 TABLET, DELAYED RELEASE ORAL at 17:58

## 2018-10-15 RX ADMIN — SODIUM CHLORIDE, PRESERVATIVE FREE SCH ML: 5 INJECTION INTRAVENOUS at 21:00

## 2018-10-15 RX ADMIN — HEPARIN SODIUM SCH UNITS: 5000 INJECTION, SOLUTION INTRAVENOUS; SUBCUTANEOUS at 21:24

## 2018-10-15 RX ADMIN — B-COMPLEX W/ C & FOLIC ACID TAB SCH TAB: TAB at 15:27

## 2018-10-15 RX ADMIN — ACYCLOVIR SCH MG: 800 TABLET ORAL at 09:51

## 2018-10-16 VITALS — DIASTOLIC BLOOD PRESSURE: 55 MMHG | SYSTOLIC BLOOD PRESSURE: 103 MMHG

## 2018-10-16 VITALS — SYSTOLIC BLOOD PRESSURE: 104 MMHG | DIASTOLIC BLOOD PRESSURE: 67 MMHG

## 2018-10-16 VITALS — DIASTOLIC BLOOD PRESSURE: 63 MMHG | SYSTOLIC BLOOD PRESSURE: 101 MMHG

## 2018-10-16 VITALS — SYSTOLIC BLOOD PRESSURE: 117 MMHG | DIASTOLIC BLOOD PRESSURE: 76 MMHG

## 2018-10-16 LAB
ALBUMIN SERPL-MCNC: 2.7 G/DL (ref 3.4–5)
ALP SERPL-CCNC: 94 U/L (ref 45–117)
ALT SERPL-CCNC: 25 U/L (ref 12–78)
ANION GAP SERPL CALC-SCNC: 8 MMOL/L (ref 5–15)
BASOPHILS # BLD AUTO: 0.01 X10^3/UL (ref 0–0.1)
BASOPHILS NFR BLD AUTO: 1 % (ref 0–1)
BILIRUB SERPL-MCNC: 0.3 MG/DL (ref 0.2–1)
CALCIUM SERPL-MCNC: 8.4 MG/DL (ref 8.5–10.1)
CHLORIDE SERPL-SCNC: 110 MMOL/L (ref 98–107)
CREAT SERPL-MCNC: 0.59 MG/DL (ref 0.55–1.02)
EOSINOPHIL # BLD AUTO: 0.01 X10^3/UL (ref 0–0.4)
EOSINOPHIL NFR BLD AUTO: 1 % (ref 1–7)
ERYTHROCYTE [DISTWIDTH] IN BLOOD BY AUTOMATED COUNT: 17.8 % (ref 9.6–15.2)
LYMPHOCYTES # BLD AUTO: 0.26 X10^3/UL (ref 1–3.4)
LYMPHOCYTES NFR BLD AUTO: 13 % (ref 22–44)
MCH RBC QN AUTO: 33.3 PG (ref 27–34.8)
MCHC RBC AUTO-ENTMCNC: 34.4 G/DL (ref 32.4–35.8)
MCV RBC AUTO: 96.6 FL (ref 80–100)
MD: (no result)
MONOCYTES # BLD AUTO: 0.01 X10^3/UL (ref 0.2–0.8)
MONOCYTES NFR BLD AUTO: 1 % (ref 2–9)
NEUTROPHILS # BLD AUTO: 1.71 X10^3/UL (ref 1.8–6.8)
NEUTROPHILS NFR BLD AUTO: 85 % (ref 42–75)
PLATELET # BLD AUTO: 233 X10^3/UL (ref 130–400)
PMV BLD AUTO: 7.5 FL (ref 7.4–10.4)
PROT SERPL-MCNC: 5.4 G/DL (ref 6.4–8.2)
RBC # BLD AUTO: 2.64 X10^6/UL (ref 3.82–5.3)

## 2018-10-16 RX ADMIN — PANTOPRAZOLE SODIUM SCH MG: 40 TABLET, DELAYED RELEASE ORAL at 08:41

## 2018-10-16 RX ADMIN — LACTULOSE SCH GM: 10 SOLUTION ORAL at 09:00

## 2018-10-16 RX ADMIN — HEPARIN SODIUM SCH UNITS: 5000 INJECTION, SOLUTION INTRAVENOUS; SUBCUTANEOUS at 20:00

## 2018-10-16 RX ADMIN — SODIUM CHLORIDE, PRESERVATIVE FREE SCH ML: 5 INJECTION INTRAVENOUS at 20:01

## 2018-10-16 RX ADMIN — OXYCODONE HYDROCHLORIDE PRN MG: 5 TABLET ORAL at 08:41

## 2018-10-16 RX ADMIN — SODIUM CHLORIDE, PRESERVATIVE FREE SCH ML: 5 INJECTION INTRAVENOUS at 12:15

## 2018-10-16 RX ADMIN — TBO-FILGRASTIM SCH MCG: 480 INJECTION, SOLUTION SUBCUTANEOUS at 21:56

## 2018-10-16 RX ADMIN — HEPARIN SODIUM SCH UNITS: 5000 INJECTION, SOLUTION INTRAVENOUS; SUBCUTANEOUS at 08:41

## 2018-10-16 RX ADMIN — PANTOPRAZOLE SODIUM SCH MG: 40 TABLET, DELAYED RELEASE ORAL at 17:44

## 2018-10-16 RX ADMIN — OXYCODONE HYDROCHLORIDE PRN MG: 5 TABLET ORAL at 17:44

## 2018-10-16 RX ADMIN — ERTAPENEM SODIUM SCH MLS/HR: 1 INJECTION, POWDER, LYOPHILIZED, FOR SOLUTION INTRAMUSCULAR; INTRAVENOUS at 17:44

## 2018-10-16 RX ADMIN — ACYCLOVIR SCH MG: 800 TABLET ORAL at 08:41

## 2018-10-16 RX ADMIN — ACYCLOVIR SCH MG: 800 TABLET ORAL at 16:58

## 2018-10-16 RX ADMIN — ALLOPURINOL SCH MG: 300 TABLET ORAL at 12:08

## 2018-10-16 RX ADMIN — MAGNESIUM OXIDE SCH MG: 400 TABLET ORAL at 08:41

## 2018-10-16 RX ADMIN — DAPTOMYCIN SCH MLS/HR: 500 INJECTION, POWDER, LYOPHILIZED, FOR SOLUTION INTRAVENOUS at 21:56

## 2018-10-16 RX ADMIN — B-COMPLEX W/ C & FOLIC ACID TAB SCH TAB: TAB at 08:41

## 2018-10-16 RX ADMIN — METHOCARBAMOL PRN MG: 750 TABLET ORAL at 20:00

## 2018-10-16 RX ADMIN — CHOLECALCIFEROL TAB 125 MCG (5000 UNIT) SCH UNIT: 125 TAB at 08:41

## 2018-10-16 RX ADMIN — ACYCLOVIR SCH MG: 800 TABLET ORAL at 20:00

## 2018-10-16 RX ADMIN — LACTULOSE SCH GM: 10 SOLUTION ORAL at 20:01

## 2018-10-16 RX ADMIN — ONDANSETRON PRN MG: 4 TABLET, ORALLY DISINTEGRATING ORAL at 08:41

## 2018-10-17 VITALS — DIASTOLIC BLOOD PRESSURE: 54 MMHG | SYSTOLIC BLOOD PRESSURE: 97 MMHG

## 2018-10-17 VITALS — DIASTOLIC BLOOD PRESSURE: 77 MMHG | SYSTOLIC BLOOD PRESSURE: 114 MMHG

## 2018-10-17 VITALS — SYSTOLIC BLOOD PRESSURE: 100 MMHG | DIASTOLIC BLOOD PRESSURE: 52 MMHG

## 2018-10-17 VITALS — SYSTOLIC BLOOD PRESSURE: 120 MMHG | DIASTOLIC BLOOD PRESSURE: 59 MMHG

## 2018-10-17 VITALS — DIASTOLIC BLOOD PRESSURE: 68 MMHG | SYSTOLIC BLOOD PRESSURE: 105 MMHG

## 2018-10-17 LAB
ALBUMIN SERPL-MCNC: 2.7 G/DL (ref 3.4–5)
ALP SERPL-CCNC: 86 U/L (ref 45–117)
ALT SERPL-CCNC: 21 U/L (ref 12–78)
ANION GAP SERPL CALC-SCNC: 6 MMOL/L (ref 5–15)
BASOPHILS # BLD AUTO: 0.01 X10^3/UL (ref 0–0.1)
BASOPHILS NFR BLD AUTO: 1 % (ref 0–1)
BILIRUB SERPL-MCNC: 0.4 MG/DL (ref 0.2–1)
CALCIUM SERPL-MCNC: 8.4 MG/DL (ref 8.5–10.1)
CHLORIDE SERPL-SCNC: 111 MMOL/L (ref 98–107)
CREAT SERPL-MCNC: 0.6 MG/DL (ref 0.55–1.02)
EOSINOPHIL # BLD AUTO: 0.02 X10^3/UL (ref 0–0.4)
EOSINOPHIL NFR BLD AUTO: 2 % (ref 1–7)
ERYTHROCYTE [DISTWIDTH] IN BLOOD BY AUTOMATED COUNT: 17.5 % (ref 9.6–15.2)
LYMPHOCYTES # BLD AUTO: 0.26 X10^3/UL (ref 1–3.4)
LYMPHOCYTES NFR BLD AUTO: 21 % (ref 22–44)
MCH RBC QN AUTO: 33.6 PG (ref 27–34.8)
MCHC RBC AUTO-ENTMCNC: 34.7 G/DL (ref 32.4–35.8)
MCV RBC AUTO: 96.7 FL (ref 80–100)
MD: (no result)
MONOCYTES # BLD AUTO: 0.01 X10^3/UL (ref 0.2–0.8)
MONOCYTES NFR BLD AUTO: 1 % (ref 2–9)
NEUTROPHILS # BLD AUTO: 0.97 X10^3/UL (ref 1.8–6.8)
NEUTROPHILS NFR BLD AUTO: 77 % (ref 42–75)
PLATELET # BLD AUTO: 170 X10^3/UL (ref 130–400)
PMV BLD AUTO: 7.7 FL (ref 7.4–10.4)
PROT SERPL-MCNC: 5.3 G/DL (ref 6.4–8.2)
RBC # BLD AUTO: 2.54 X10^6/UL (ref 3.82–5.3)

## 2018-10-17 RX ADMIN — SODIUM CHLORIDE, PRESERVATIVE FREE SCH ML: 5 INJECTION INTRAVENOUS at 21:00

## 2018-10-17 RX ADMIN — OXYCODONE HYDROCHLORIDE PRN MG: 5 TABLET ORAL at 11:01

## 2018-10-17 RX ADMIN — TBO-FILGRASTIM SCH MCG: 480 INJECTION, SOLUTION SUBCUTANEOUS at 21:00

## 2018-10-17 RX ADMIN — ONDANSETRON PRN MG: 4 TABLET, ORALLY DISINTEGRATING ORAL at 11:01

## 2018-10-17 RX ADMIN — OXYCODONE HYDROCHLORIDE PRN MG: 5 TABLET ORAL at 17:06

## 2018-10-17 RX ADMIN — SODIUM CHLORIDE, PRESERVATIVE FREE SCH ML: 5 INJECTION INTRAVENOUS at 07:58

## 2018-10-17 RX ADMIN — PROMETHAZINE HYDROCHLORIDE PRN MG: 25 INJECTION INTRAMUSCULAR; INTRAVENOUS at 20:59

## 2018-10-17 RX ADMIN — LACTULOSE SCH GM: 10 SOLUTION ORAL at 07:59

## 2018-10-17 RX ADMIN — PANTOPRAZOLE SODIUM SCH MG: 40 TABLET, DELAYED RELEASE ORAL at 07:58

## 2018-10-17 RX ADMIN — HEPARIN SODIUM SCH UNITS: 5000 INJECTION, SOLUTION INTRAVENOUS; SUBCUTANEOUS at 20:59

## 2018-10-17 RX ADMIN — B-COMPLEX W/ C & FOLIC ACID TAB SCH TAB: TAB at 08:30

## 2018-10-17 RX ADMIN — CHOLECALCIFEROL TAB 125 MCG (5000 UNIT) SCH UNIT: 125 TAB at 07:58

## 2018-10-17 RX ADMIN — PANTOPRAZOLE SODIUM SCH MG: 40 TABLET, DELAYED RELEASE ORAL at 17:05

## 2018-10-17 RX ADMIN — ONDANSETRON PRN MG: 4 TABLET, ORALLY DISINTEGRATING ORAL at 17:05

## 2018-10-17 RX ADMIN — ACYCLOVIR SCH MG: 800 TABLET ORAL at 17:05

## 2018-10-17 RX ADMIN — ALLOPURINOL SCH MG: 300 TABLET ORAL at 07:58

## 2018-10-17 RX ADMIN — MAGNESIUM OXIDE SCH MG: 400 TABLET ORAL at 07:58

## 2018-10-17 RX ADMIN — ACYCLOVIR SCH MG: 800 TABLET ORAL at 21:00

## 2018-10-17 RX ADMIN — ACYCLOVIR SCH MG: 800 TABLET ORAL at 07:58

## 2018-10-17 RX ADMIN — ERTAPENEM SODIUM SCH MLS/HR: 1 INJECTION, POWDER, LYOPHILIZED, FOR SOLUTION INTRAMUSCULAR; INTRAVENOUS at 17:05

## 2018-10-17 RX ADMIN — LACTULOSE SCH GM: 10 SOLUTION ORAL at 21:00

## 2018-10-17 RX ADMIN — DAPTOMYCIN SCH MLS/HR: 500 INJECTION, POWDER, LYOPHILIZED, FOR SOLUTION INTRAVENOUS at 20:59

## 2018-10-17 RX ADMIN — HEPARIN SODIUM SCH UNITS: 5000 INJECTION, SOLUTION INTRAVENOUS; SUBCUTANEOUS at 07:59

## 2018-10-18 VITALS — SYSTOLIC BLOOD PRESSURE: 130 MMHG | DIASTOLIC BLOOD PRESSURE: 74 MMHG

## 2018-10-18 VITALS — DIASTOLIC BLOOD PRESSURE: 63 MMHG | SYSTOLIC BLOOD PRESSURE: 96 MMHG

## 2018-10-18 VITALS — SYSTOLIC BLOOD PRESSURE: 93 MMHG | DIASTOLIC BLOOD PRESSURE: 53 MMHG

## 2018-10-18 VITALS — SYSTOLIC BLOOD PRESSURE: 107 MMHG | DIASTOLIC BLOOD PRESSURE: 62 MMHG

## 2018-10-18 LAB
ALBUMIN SERPL-MCNC: 2.8 G/DL (ref 3.4–5)
ALP SERPL-CCNC: 88 U/L (ref 45–117)
ALT SERPL-CCNC: 21 U/L (ref 12–78)
ANION GAP SERPL CALC-SCNC: 5 MMOL/L (ref 5–15)
BASOPHILS # BLD AUTO: 0.01 X10^3/UL (ref 0–0.1)
BASOPHILS NFR BLD AUTO: 2 % (ref 0–1)
BILIRUB SERPL-MCNC: 0.2 MG/DL (ref 0.2–1)
CALCIUM SERPL-MCNC: 8.7 MG/DL (ref 8.5–10.1)
CHLORIDE SERPL-SCNC: 111 MMOL/L (ref 98–107)
CREAT SERPL-MCNC: 0.65 MG/DL (ref 0.55–1.02)
EOSINOPHIL # BLD AUTO: 0.02 X10^3/UL (ref 0–0.4)
EOSINOPHIL NFR BLD AUTO: 2 % (ref 1–7)
ERYTHROCYTE [DISTWIDTH] IN BLOOD BY AUTOMATED COUNT: 17.9 % (ref 9.6–15.2)
LYMPHOCYTES # BLD AUTO: 0.27 X10^3/UL (ref 1–3.4)
LYMPHOCYTES NFR BLD AUTO: 38 % (ref 22–44)
MCH RBC QN AUTO: 32.8 PG (ref 27–34.8)
MCHC RBC AUTO-ENTMCNC: 33.9 G/DL (ref 32.4–35.8)
MCV RBC AUTO: 96.8 FL (ref 80–100)
MD: (no result)
MONOCYTES # BLD AUTO: 0.03 X10^3/UL (ref 0.2–0.8)
MONOCYTES NFR BLD AUTO: 5 % (ref 2–9)
NEUTROPHILS # BLD AUTO: 0.37 X10^3/UL (ref 1.8–6.8)
NEUTROPHILS NFR BLD AUTO: 53 % (ref 42–75)
PLATELET # BLD AUTO: 159 X10^3/UL (ref 130–400)
PMV BLD AUTO: 8.2 FL (ref 7.4–10.4)
PROT SERPL-MCNC: 5.5 G/DL (ref 6.4–8.2)
RBC # BLD AUTO: 2.55 X10^6/UL (ref 3.82–5.3)

## 2018-10-18 RX ADMIN — CHOLECALCIFEROL TAB 125 MCG (5000 UNIT) SCH UNIT: 125 TAB at 09:27

## 2018-10-18 RX ADMIN — LACTULOSE SCH GM: 10 SOLUTION ORAL at 20:37

## 2018-10-18 RX ADMIN — SODIUM CHLORIDE, PRESERVATIVE FREE SCH ML: 5 INJECTION INTRAVENOUS at 09:26

## 2018-10-18 RX ADMIN — HEPARIN SODIUM SCH UNITS: 5000 INJECTION, SOLUTION INTRAVENOUS; SUBCUTANEOUS at 20:37

## 2018-10-18 RX ADMIN — PANTOPRAZOLE SODIUM SCH MG: 40 TABLET, DELAYED RELEASE ORAL at 07:39

## 2018-10-18 RX ADMIN — ALLOPURINOL SCH MG: 300 TABLET ORAL at 09:27

## 2018-10-18 RX ADMIN — MAGNESIUM OXIDE SCH MG: 400 TABLET ORAL at 09:26

## 2018-10-18 RX ADMIN — ACYCLOVIR SCH MG: 800 TABLET ORAL at 20:36

## 2018-10-18 RX ADMIN — SODIUM CHLORIDE, PRESERVATIVE FREE SCH ML: 5 INJECTION INTRAVENOUS at 20:37

## 2018-10-18 RX ADMIN — PANTOPRAZOLE SODIUM SCH MG: 40 TABLET, DELAYED RELEASE ORAL at 17:03

## 2018-10-18 RX ADMIN — B-COMPLEX W/ C & FOLIC ACID TAB SCH TAB: TAB at 09:26

## 2018-10-18 RX ADMIN — TBO-FILGRASTIM SCH MCG: 480 INJECTION, SOLUTION SUBCUTANEOUS at 09:27

## 2018-10-18 RX ADMIN — ACYCLOVIR SCH MG: 800 TABLET ORAL at 15:54

## 2018-10-18 RX ADMIN — OXYCODONE HYDROCHLORIDE PRN MG: 5 TABLET ORAL at 09:42

## 2018-10-18 RX ADMIN — LACTULOSE SCH GM: 10 SOLUTION ORAL at 09:00

## 2018-10-18 RX ADMIN — ACYCLOVIR SCH MG: 800 TABLET ORAL at 09:27

## 2018-10-18 RX ADMIN — DAPTOMYCIN SCH MLS/HR: 500 INJECTION, POWDER, LYOPHILIZED, FOR SOLUTION INTRAVENOUS at 20:36

## 2018-10-18 RX ADMIN — HEPARIN SODIUM SCH UNITS: 5000 INJECTION, SOLUTION INTRAVENOUS; SUBCUTANEOUS at 09:27

## 2018-10-18 RX ADMIN — ERTAPENEM SODIUM SCH MLS/HR: 1 INJECTION, POWDER, LYOPHILIZED, FOR SOLUTION INTRAMUSCULAR; INTRAVENOUS at 17:03

## 2018-10-18 RX ADMIN — LACTULOSE SCH GM: 10 SOLUTION ORAL at 09:26

## 2018-10-19 VITALS — SYSTOLIC BLOOD PRESSURE: 115 MMHG | DIASTOLIC BLOOD PRESSURE: 72 MMHG

## 2018-10-19 VITALS — DIASTOLIC BLOOD PRESSURE: 76 MMHG | SYSTOLIC BLOOD PRESSURE: 114 MMHG

## 2018-10-19 VITALS — SYSTOLIC BLOOD PRESSURE: 112 MMHG | DIASTOLIC BLOOD PRESSURE: 65 MMHG

## 2018-10-19 VITALS — DIASTOLIC BLOOD PRESSURE: 55 MMHG | SYSTOLIC BLOOD PRESSURE: 101 MMHG

## 2018-10-19 LAB
<PLATELET ESTIMATE>: ADEQUATE
<PLT MORPHOLOGY>: (no result)
ALBUMIN SERPL-MCNC: 2.9 G/DL (ref 3.4–5)
ALP SERPL-CCNC: 88 U/L (ref 45–117)
ALT SERPL-CCNC: 25 U/L (ref 12–78)
ANION GAP SERPL CALC-SCNC: 5 MMOL/L (ref 5–15)
BILIRUB DIRECT SERPL-MCNC: NORMAL MG/DL
BILIRUB SERPL-MCNC: 0.3 MG/DL (ref 0.2–1)
CALCIUM SERPL-MCNC: 8.4 MG/DL (ref 8.5–10.1)
CHLORIDE SERPL-SCNC: 111 MMOL/L (ref 98–107)
CREAT SERPL-MCNC: 0.57 MG/DL (ref 0.55–1.02)
EOS#(MANUAL): 0.01 X10^3/UL (ref 0–0.4)
EOS% (MANUAL): 2 % (ref 1–7)
ERYTHROCYTE [DISTWIDTH] IN BLOOD BY AUTOMATED COUNT: 18.2 % (ref 9.6–15.2)
LYMPH#(MANUAL): 0.31 X10^3/UL (ref 1–3.4)
LYMPHS% (MANUAL): 52 % (ref 22–44)
MCH RBC QN AUTO: 32.6 PG (ref 27–34.8)
MCHC RBC AUTO-ENTMCNC: 33.8 G/DL (ref 32.4–35.8)
MCV RBC AUTO: 96.5 FL (ref 80–100)
MD: YES
MONOS#(MANUAL): 0.17 X10^3/UL (ref 0.3–2.7)
MONOS% (MANUAL): 29 % (ref 2–9)
NRBC % (MANUAL): 5 % (ref 0–1)
PLATELET # BLD AUTO: 143 X10^3/UL (ref 130–400)
PMV BLD AUTO: 8.3 FL (ref 7.4–10.4)
PROT SERPL-MCNC: 5.5 G/DL (ref 6.4–8.2)
RBC # BLD AUTO: 2.65 X10^6/UL (ref 3.82–5.3)
SEG#(MANUAL): 0.1 X10^3/UL (ref 1.8–6.8)
SEGS% (MANUAL): 17 % (ref 42–75)

## 2018-10-19 RX ADMIN — MAGNESIUM OXIDE SCH MG: 400 TABLET ORAL at 09:30

## 2018-10-19 RX ADMIN — ONDANSETRON PRN MG: 2 INJECTION, SOLUTION INTRAMUSCULAR; INTRAVENOUS at 16:27

## 2018-10-19 RX ADMIN — TEMAZEPAM PRN MG: 15 CAPSULE ORAL at 20:47

## 2018-10-19 RX ADMIN — ERTAPENEM SODIUM SCH MLS/HR: 1 INJECTION, POWDER, LYOPHILIZED, FOR SOLUTION INTRAMUSCULAR; INTRAVENOUS at 17:31

## 2018-10-19 RX ADMIN — ACYCLOVIR SCH MG: 800 TABLET ORAL at 16:17

## 2018-10-19 RX ADMIN — PROMETHAZINE HYDROCHLORIDE PRN MG: 25 INJECTION INTRAMUSCULAR; INTRAVENOUS at 14:58

## 2018-10-19 RX ADMIN — LACTULOSE SCH GM: 10 SOLUTION ORAL at 09:28

## 2018-10-19 RX ADMIN — CHOLECALCIFEROL TAB 125 MCG (5000 UNIT) SCH UNIT: 125 TAB at 09:29

## 2018-10-19 RX ADMIN — TBO-FILGRASTIM SCH MCG: 480 INJECTION, SOLUTION SUBCUTANEOUS at 09:31

## 2018-10-19 RX ADMIN — ACYCLOVIR SCH MG: 800 TABLET ORAL at 09:29

## 2018-10-19 RX ADMIN — LACTULOSE SCH GM: 10 SOLUTION ORAL at 20:47

## 2018-10-19 RX ADMIN — ALLOPURINOL SCH MG: 300 TABLET ORAL at 09:29

## 2018-10-19 RX ADMIN — SODIUM CHLORIDE, PRESERVATIVE FREE SCH ML: 5 INJECTION INTRAVENOUS at 20:47

## 2018-10-19 RX ADMIN — SODIUM CHLORIDE, PRESERVATIVE FREE SCH ML: 5 INJECTION INTRAVENOUS at 09:32

## 2018-10-19 RX ADMIN — ONDANSETRON PRN MG: 4 TABLET, ORALLY DISINTEGRATING ORAL at 20:31

## 2018-10-19 RX ADMIN — B-COMPLEX W/ C & FOLIC ACID TAB SCH TAB: TAB at 09:29

## 2018-10-19 RX ADMIN — HYDROMORPHONE HYDROCHLORIDE PRN MG: 2 INJECTION INTRAMUSCULAR; INTRAVENOUS; SUBCUTANEOUS at 16:17

## 2018-10-19 RX ADMIN — ACYCLOVIR SCH MG: 800 TABLET ORAL at 20:31

## 2018-10-19 RX ADMIN — OXYCODONE HYDROCHLORIDE PRN MG: 5 TABLET ORAL at 20:31

## 2018-10-19 RX ADMIN — OXYCODONE HYDROCHLORIDE PRN MG: 5 TABLET ORAL at 09:29

## 2018-10-19 RX ADMIN — PANTOPRAZOLE SODIUM SCH MG: 40 TABLET, DELAYED RELEASE ORAL at 17:31

## 2018-10-19 RX ADMIN — HEPARIN SODIUM SCH UNITS: 5000 INJECTION, SOLUTION INTRAVENOUS; SUBCUTANEOUS at 20:31

## 2018-10-19 RX ADMIN — DAPTOMYCIN SCH MLS/HR: 500 INJECTION, POWDER, LYOPHILIZED, FOR SOLUTION INTRAVENOUS at 20:31

## 2018-10-19 RX ADMIN — METHOCARBAMOL PRN MG: 750 TABLET ORAL at 10:26

## 2018-10-19 RX ADMIN — HEPARIN SODIUM SCH UNITS: 5000 INJECTION, SOLUTION INTRAVENOUS; SUBCUTANEOUS at 09:31

## 2018-10-19 RX ADMIN — PANTOPRAZOLE SODIUM SCH MG: 40 TABLET, DELAYED RELEASE ORAL at 07:55

## 2018-10-20 VITALS — SYSTOLIC BLOOD PRESSURE: 113 MMHG | DIASTOLIC BLOOD PRESSURE: 64 MMHG

## 2018-10-20 VITALS — SYSTOLIC BLOOD PRESSURE: 96 MMHG | DIASTOLIC BLOOD PRESSURE: 62 MMHG

## 2018-10-20 VITALS — SYSTOLIC BLOOD PRESSURE: 105 MMHG | DIASTOLIC BLOOD PRESSURE: 69 MMHG

## 2018-10-20 VITALS — SYSTOLIC BLOOD PRESSURE: 117 MMHG | DIASTOLIC BLOOD PRESSURE: 76 MMHG

## 2018-10-20 LAB
<PLATELET ESTIMATE>: ADEQUATE
<PLT MORPHOLOGY>: (no result)
ALBUMIN SERPL-MCNC: 2.9 G/DL (ref 3.4–5)
ALP SERPL-CCNC: 94 U/L (ref 45–117)
ALT SERPL-CCNC: 22 U/L (ref 12–78)
ANION GAP SERPL CALC-SCNC: 5 MMOL/L (ref 5–15)
BAND#(MANUAL): 0.24 X10^3/UL
BILIRUB DIRECT SERPL-MCNC: NORMAL MG/DL
BILIRUB SERPL-MCNC: 0.3 MG/DL (ref 0.2–1)
CALCIUM SERPL-MCNC: 8.4 MG/DL (ref 8.5–10.1)
CHLORIDE SERPL-SCNC: 109 MMOL/L (ref 98–107)
CREAT SERPL-MCNC: 0.61 MG/DL (ref 0.55–1.02)
EOS#(MANUAL): 0.03 X10^3/UL (ref 0–0.4)
EOS% (MANUAL): 2 % (ref 1–7)
ERYTHROCYTE [DISTWIDTH] IN BLOOD BY AUTOMATED COUNT: 18.1 % (ref 9.6–15.2)
LYMPH#(MANUAL): 0.3 X10^3/UL (ref 1–3.4)
LYMPHS% (MANUAL): 20 % (ref 22–44)
MCH RBC QN AUTO: 32.8 PG (ref 27–34.8)
MCHC RBC AUTO-ENTMCNC: 33.7 G/DL (ref 32.4–35.8)
MCV RBC AUTO: 97.2 FL (ref 80–100)
MD: YES
METAMYELOCYTES# (MANUAL): 0.09 X10^3/UL (ref 0–0)
METAMYELOCYTES% (MANUAL): 6 % (ref 0–1)
MONOS#(MANUAL): 0.09 X10^3/UL (ref 0.3–2.7)
MONOS% (MANUAL): 6 % (ref 2–9)
MYELOCYTES# (MANUAL): 0.09 X10^3/UL (ref 0–0)
MYELOCYTES% (MANUAL): 6 % (ref 0–0)
NEUTS BAND NFR BLD: 16 % (ref 0–7)
PLATELET # BLD AUTO: 144 X10^3/UL (ref 130–400)
PMV BLD AUTO: 8 FL (ref 7.4–10.4)
PROT SERPL-MCNC: 5.6 G/DL (ref 6.4–8.2)
RBC # BLD AUTO: 2.79 X10^6/UL (ref 3.82–5.3)
SEG#(MANUAL): 0.66 X10^3/UL (ref 1.8–6.8)
SEGS% (MANUAL): 44 % (ref 42–75)

## 2018-10-20 RX ADMIN — OXYCODONE HYDROCHLORIDE PRN MG: 5 TABLET ORAL at 17:09

## 2018-10-20 RX ADMIN — DAPTOMYCIN SCH MLS/HR: 500 INJECTION, POWDER, LYOPHILIZED, FOR SOLUTION INTRAVENOUS at 20:29

## 2018-10-20 RX ADMIN — MAGNESIUM OXIDE SCH MG: 400 TABLET ORAL at 10:07

## 2018-10-20 RX ADMIN — ERTAPENEM SODIUM SCH MLS/HR: 1 INJECTION, POWDER, LYOPHILIZED, FOR SOLUTION INTRAMUSCULAR; INTRAVENOUS at 17:09

## 2018-10-20 RX ADMIN — CHOLECALCIFEROL TAB 125 MCG (5000 UNIT) SCH UNIT: 125 TAB at 10:06

## 2018-10-20 RX ADMIN — OXYCODONE HYDROCHLORIDE PRN MG: 5 TABLET ORAL at 12:25

## 2018-10-20 RX ADMIN — B-COMPLEX W/ C & FOLIC ACID TAB SCH TAB: TAB at 10:07

## 2018-10-20 RX ADMIN — HEPARIN SODIUM SCH UNITS: 5000 INJECTION, SOLUTION INTRAVENOUS; SUBCUTANEOUS at 20:28

## 2018-10-20 RX ADMIN — PANTOPRAZOLE SODIUM SCH MG: 40 TABLET, DELAYED RELEASE ORAL at 16:14

## 2018-10-20 RX ADMIN — ACYCLOVIR SCH MG: 800 TABLET ORAL at 10:06

## 2018-10-20 RX ADMIN — ACYCLOVIR SCH MG: 800 TABLET ORAL at 16:14

## 2018-10-20 RX ADMIN — ONDANSETRON PRN MG: 4 TABLET, ORALLY DISINTEGRATING ORAL at 20:00

## 2018-10-20 RX ADMIN — SODIUM CHLORIDE, PRESERVATIVE FREE SCH ML: 5 INJECTION INTRAVENOUS at 10:06

## 2018-10-20 RX ADMIN — Medication SCH MG: at 12:22

## 2018-10-20 RX ADMIN — LACTULOSE SCH GM: 10 SOLUTION ORAL at 20:28

## 2018-10-20 RX ADMIN — SODIUM CHLORIDE, PRESERVATIVE FREE SCH ML: 5 INJECTION INTRAVENOUS at 20:27

## 2018-10-20 RX ADMIN — ONDANSETRON PRN MG: 2 INJECTION, SOLUTION INTRAMUSCULAR; INTRAVENOUS at 10:07

## 2018-10-20 RX ADMIN — PANTOPRAZOLE SODIUM SCH MG: 40 TABLET, DELAYED RELEASE ORAL at 07:55

## 2018-10-20 RX ADMIN — HEPARIN SODIUM SCH UNITS: 5000 INJECTION, SOLUTION INTRAVENOUS; SUBCUTANEOUS at 10:07

## 2018-10-20 RX ADMIN — LACTULOSE SCH GM: 10 SOLUTION ORAL at 09:00

## 2018-10-20 RX ADMIN — ALLOPURINOL SCH MG: 300 TABLET ORAL at 10:07

## 2018-10-20 RX ADMIN — ACYCLOVIR SCH MG: 800 TABLET ORAL at 20:28

## 2018-10-21 VITALS — SYSTOLIC BLOOD PRESSURE: 95 MMHG | DIASTOLIC BLOOD PRESSURE: 49 MMHG

## 2018-10-21 VITALS — SYSTOLIC BLOOD PRESSURE: 102 MMHG | DIASTOLIC BLOOD PRESSURE: 66 MMHG

## 2018-10-21 VITALS — DIASTOLIC BLOOD PRESSURE: 71 MMHG | SYSTOLIC BLOOD PRESSURE: 111 MMHG

## 2018-10-21 VITALS — DIASTOLIC BLOOD PRESSURE: 53 MMHG | SYSTOLIC BLOOD PRESSURE: 105 MMHG

## 2018-10-21 LAB
<PLATELET ESTIMATE>: ADEQUATE
<PLT MORPHOLOGY>: (no result)
ALBUMIN SERPL-MCNC: 2.6 G/DL (ref 3.4–5)
ALP SERPL-CCNC: 90 U/L (ref 45–117)
ALT SERPL-CCNC: 18 U/L (ref 12–78)
ANION GAP SERPL CALC-SCNC: 10 MMOL/L (ref 5–15)
ANISOCYTOSIS BLD QL SMEAR: (no result)
BAND#(MANUAL): 1.1 X10^3/UL
BILIRUB SERPL-MCNC: 0.2 MG/DL (ref 0.2–1)
CALCIUM SERPL-MCNC: 8.4 MG/DL (ref 8.5–10.1)
CHLORIDE SERPL-SCNC: 108 MMOL/L (ref 98–107)
CREAT SERPL-MCNC: 0.65 MG/DL (ref 0.55–1.02)
ERYTHROCYTE [DISTWIDTH] IN BLOOD BY AUTOMATED COUNT: 19.9 % (ref 9.6–15.2)
LYMPH#(MANUAL): 0.72 X10^3/UL (ref 1–3.4)
LYMPHS% (MANUAL): 13 % (ref 22–44)
MACROCYTES BLD QL SMEAR: (no result)
MCH RBC QN AUTO: 33.6 PG (ref 27–34.8)
MCHC RBC AUTO-ENTMCNC: 34.3 G/DL (ref 32.4–35.8)
MCV RBC AUTO: 98 FL (ref 80–100)
MD: YES
METAMYELOCYTES# (MANUAL): 0.17 X10^3/UL (ref 0–0)
METAMYELOCYTES% (MANUAL): 3 % (ref 0–1)
MONOS#(MANUAL): 0.5 X10^3/UL (ref 0.3–2.7)
MONOS% (MANUAL): 9 % (ref 2–9)
MYELOCYTES# (MANUAL): 0.28 X10^3/UL (ref 0–0)
MYELOCYTES% (MANUAL): 5 % (ref 0–0)
NEUTS BAND NFR BLD: 20 % (ref 0–7)
NRBC % (MANUAL): 1 % (ref 0–1)
PLATELET # BLD AUTO: 153 X10^3/UL (ref 130–400)
PMV BLD AUTO: 8.4 FL (ref 7.4–10.4)
POLYCHROMASIA BLD QL SMEAR: (no result)
PROT SERPL-MCNC: 5.4 G/DL (ref 6.4–8.2)
RBC # BLD AUTO: 2.65 X10^6/UL (ref 3.82–5.3)
SEG#(MANUAL): 2.75 X10^3/UL (ref 1.8–6.8)
SEGS% (MANUAL): 50 % (ref 42–75)

## 2018-10-21 RX ADMIN — ACYCLOVIR SCH MG: 800 TABLET ORAL at 17:10

## 2018-10-21 RX ADMIN — B-COMPLEX W/ C & FOLIC ACID TAB SCH TAB: TAB at 08:42

## 2018-10-21 RX ADMIN — Medication SCH MG: at 08:43

## 2018-10-21 RX ADMIN — OXYCODONE HYDROCHLORIDE PRN MG: 5 TABLET ORAL at 20:57

## 2018-10-21 RX ADMIN — ACYCLOVIR SCH MG: 800 TABLET ORAL at 08:43

## 2018-10-21 RX ADMIN — ONDANSETRON PRN MG: 2 INJECTION, SOLUTION INTRAMUSCULAR; INTRAVENOUS at 18:18

## 2018-10-21 RX ADMIN — CHOLECALCIFEROL TAB 125 MCG (5000 UNIT) SCH UNIT: 125 TAB at 08:43

## 2018-10-21 RX ADMIN — ERTAPENEM SODIUM SCH MLS/HR: 1 INJECTION, POWDER, LYOPHILIZED, FOR SOLUTION INTRAMUSCULAR; INTRAVENOUS at 17:10

## 2018-10-21 RX ADMIN — HEPARIN SODIUM SCH UNITS: 5000 INJECTION, SOLUTION INTRAVENOUS; SUBCUTANEOUS at 08:44

## 2018-10-21 RX ADMIN — SODIUM CHLORIDE, PRESERVATIVE FREE SCH ML: 5 INJECTION INTRAVENOUS at 08:44

## 2018-10-21 RX ADMIN — ALLOPURINOL SCH MG: 300 TABLET ORAL at 08:43

## 2018-10-21 RX ADMIN — SODIUM CHLORIDE, PRESERVATIVE FREE SCH ML: 5 INJECTION INTRAVENOUS at 20:57

## 2018-10-21 RX ADMIN — HEPARIN SODIUM SCH UNITS: 5000 INJECTION, SOLUTION INTRAVENOUS; SUBCUTANEOUS at 20:59

## 2018-10-21 RX ADMIN — ACYCLOVIR SCH MG: 800 TABLET ORAL at 20:59

## 2018-10-21 RX ADMIN — DAPTOMYCIN SCH MLS/HR: 500 INJECTION, POWDER, LYOPHILIZED, FOR SOLUTION INTRAVENOUS at 20:57

## 2018-10-21 RX ADMIN — LACTULOSE SCH GM: 10 SOLUTION ORAL at 08:45

## 2018-10-21 RX ADMIN — MAGNESIUM OXIDE SCH MG: 400 TABLET ORAL at 08:43

## 2018-10-21 RX ADMIN — PANTOPRAZOLE SODIUM SCH MG: 40 TABLET, DELAYED RELEASE ORAL at 17:10

## 2018-10-21 RX ADMIN — LACTULOSE SCH GM: 10 SOLUTION ORAL at 08:43

## 2018-10-21 RX ADMIN — LACTULOSE SCH GM: 10 SOLUTION ORAL at 20:57

## 2018-10-21 RX ADMIN — PANTOPRAZOLE SODIUM SCH MG: 40 TABLET, DELAYED RELEASE ORAL at 08:43

## 2018-10-22 VITALS — SYSTOLIC BLOOD PRESSURE: 95 MMHG | DIASTOLIC BLOOD PRESSURE: 60 MMHG

## 2018-10-22 VITALS — DIASTOLIC BLOOD PRESSURE: 71 MMHG | SYSTOLIC BLOOD PRESSURE: 107 MMHG

## 2018-10-22 VITALS — DIASTOLIC BLOOD PRESSURE: 68 MMHG | SYSTOLIC BLOOD PRESSURE: 104 MMHG

## 2018-10-22 VITALS — DIASTOLIC BLOOD PRESSURE: 52 MMHG | SYSTOLIC BLOOD PRESSURE: 87 MMHG

## 2018-10-22 LAB
<PLATELET ESTIMATE>: ADEQUATE
<PLT MORPHOLOGY>: (no result)
ALBUMIN SERPL-MCNC: 2.6 G/DL (ref 3.4–5)
ALP SERPL-CCNC: 88 U/L (ref 45–117)
ALT SERPL-CCNC: 25 U/L (ref 12–78)
ANION GAP SERPL CALC-SCNC: 7 MMOL/L (ref 5–15)
ANISOCYTOSIS BLD QL SMEAR: (no result)
BAND#(MANUAL): 0.92 X10^3/UL
BILIRUB SERPL-MCNC: 0.1 MG/DL (ref 0.2–1)
CALCIUM SERPL-MCNC: 8.2 MG/DL (ref 8.5–10.1)
CHLORIDE SERPL-SCNC: 111 MMOL/L (ref 98–107)
CK SERPL-CCNC: 21 U/L (ref 26–192)
CREAT SERPL-MCNC: 0.62 MG/DL (ref 0.55–1.02)
CRP SERPL-MCNC: 2.7 MG/DL (ref 0.02–0.49)
ERYTHROCYTE [DISTWIDTH] IN BLOOD BY AUTOMATED COUNT: 20.2 % (ref 9.6–15.2)
ERYTHROCYTE [SEDIMENTATION RATE] IN BLOOD BY WESTERGREN METHOD: 51 MM/HR (ref 0–20)
HCT (SEDRATE): 25.3 % (ref 34.6–47.8)
LYMPH#(MANUAL): 0.71 X10^3/UL (ref 1–3.4)
LYMPHS% (MANUAL): 14 % (ref 22–44)
MACROCYTES BLD QL SMEAR: (no result)
MCH RBC QN AUTO: 32.9 PG (ref 27–34.8)
MCHC RBC AUTO-ENTMCNC: 33.6 G/DL (ref 32.4–35.8)
MCV RBC AUTO: 98.1 FL (ref 80–100)
MD: YES
METAMYELOCYTES# (MANUAL): 0.41 X10^3/UL (ref 0–0)
METAMYELOCYTES% (MANUAL): 8 % (ref 0–1)
MONOS#(MANUAL): 0.46 X10^3/UL (ref 0.3–2.7)
MONOS% (MANUAL): 9 % (ref 2–9)
MYELOCYTES# (MANUAL): 0.1 X10^3/UL (ref 0–0)
MYELOCYTES% (MANUAL): 2 % (ref 0–0)
NEUTS BAND NFR BLD: 18 % (ref 0–7)
NRBC % (MANUAL): 2 % (ref 0–1)
PLATELET # BLD AUTO: 169 X10^3/UL (ref 130–400)
PMV BLD AUTO: 8 FL (ref 7.4–10.4)
POLYCHROMASIA BLD QL SMEAR: (no result)
PROT SERPL-MCNC: 5.2 G/DL (ref 6.4–8.2)
RBC # BLD AUTO: 2.58 X10^6/UL (ref 3.82–5.3)
SEG#(MANUAL): 2.5 X10^3/UL (ref 1.8–6.8)
SEGS% (MANUAL): 49 % (ref 42–75)

## 2018-10-22 RX ADMIN — ACYCLOVIR SCH MG: 800 TABLET ORAL at 08:38

## 2018-10-22 RX ADMIN — PANTOPRAZOLE SODIUM SCH MG: 40 TABLET, DELAYED RELEASE ORAL at 08:38

## 2018-10-22 RX ADMIN — SODIUM CHLORIDE, PRESERVATIVE FREE SCH ML: 5 INJECTION INTRAVENOUS at 19:33

## 2018-10-22 RX ADMIN — LACTULOSE SCH GM: 10 SOLUTION ORAL at 19:33

## 2018-10-22 RX ADMIN — Medication SCH MG: at 08:38

## 2018-10-22 RX ADMIN — SODIUM CHLORIDE, PRESERVATIVE FREE SCH ML: 5 INJECTION INTRAVENOUS at 08:40

## 2018-10-22 RX ADMIN — PANTOPRAZOLE SODIUM SCH MG: 40 TABLET, DELAYED RELEASE ORAL at 15:56

## 2018-10-22 RX ADMIN — ACYCLOVIR SCH MG: 800 TABLET ORAL at 19:32

## 2018-10-22 RX ADMIN — MAGNESIUM OXIDE SCH MG: 400 TABLET ORAL at 08:37

## 2018-10-22 RX ADMIN — TEMAZEPAM PRN MG: 15 CAPSULE ORAL at 19:32

## 2018-10-22 RX ADMIN — ALLOPURINOL SCH MG: 300 TABLET ORAL at 08:37

## 2018-10-22 RX ADMIN — ONDANSETRON PRN MG: 4 TABLET, ORALLY DISINTEGRATING ORAL at 17:47

## 2018-10-22 RX ADMIN — CHOLECALCIFEROL TAB 125 MCG (5000 UNIT) SCH UNIT: 125 TAB at 08:37

## 2018-10-22 RX ADMIN — HEPARIN SODIUM SCH UNITS: 5000 INJECTION, SOLUTION INTRAVENOUS; SUBCUTANEOUS at 19:32

## 2018-10-22 RX ADMIN — ACYCLOVIR SCH MG: 800 TABLET ORAL at 15:55

## 2018-10-22 RX ADMIN — HEPARIN SODIUM SCH UNITS: 5000 INJECTION, SOLUTION INTRAVENOUS; SUBCUTANEOUS at 08:38

## 2018-10-22 RX ADMIN — OXYCODONE HYDROCHLORIDE PRN MG: 5 TABLET ORAL at 08:39

## 2018-10-22 RX ADMIN — LACTULOSE SCH GM: 10 SOLUTION ORAL at 08:36

## 2018-10-22 RX ADMIN — OXYCODONE HYDROCHLORIDE PRN MG: 5 TABLET ORAL at 17:47

## 2018-10-22 RX ADMIN — B-COMPLEX W/ C & FOLIC ACID TAB SCH TAB: TAB at 08:38

## 2018-10-23 VITALS — DIASTOLIC BLOOD PRESSURE: 75 MMHG | SYSTOLIC BLOOD PRESSURE: 117 MMHG

## 2018-10-23 VITALS — SYSTOLIC BLOOD PRESSURE: 119 MMHG | DIASTOLIC BLOOD PRESSURE: 76 MMHG

## 2018-10-23 VITALS — DIASTOLIC BLOOD PRESSURE: 78 MMHG | SYSTOLIC BLOOD PRESSURE: 128 MMHG

## 2018-10-23 VITALS — DIASTOLIC BLOOD PRESSURE: 68 MMHG | SYSTOLIC BLOOD PRESSURE: 103 MMHG

## 2018-10-23 LAB
<PLATELET ESTIMATE>: ADEQUATE
<PLT MORPHOLOGY>: (no result)
ALBUMIN SERPL-MCNC: 2.6 G/DL (ref 3.4–5)
ALP SERPL-CCNC: 85 U/L (ref 45–117)
ALT SERPL-CCNC: 27 U/L (ref 12–78)
ANION GAP SERPL CALC-SCNC: 7 MMOL/L (ref 5–15)
ANISOCYTOSIS BLD QL SMEAR: (no result)
BAND#(MANUAL): 0.73 X10^3/UL
BILIRUB SERPL-MCNC: 0.7 MG/DL (ref 0.2–1)
CALCIUM SERPL-MCNC: 8.5 MG/DL (ref 8.5–10.1)
CHLORIDE SERPL-SCNC: 110 MMOL/L (ref 98–107)
CREAT SERPL-MCNC: 0.69 MG/DL (ref 0.55–1.02)
EOS#(MANUAL): 0.04 X10^3/UL (ref 0–0.4)
EOS% (MANUAL): 1 % (ref 1–7)
ERYTHROCYTE [DISTWIDTH] IN BLOOD BY AUTOMATED COUNT: 20.3 % (ref 9.6–15.2)
LYMPH#(MANUAL): 0.82 X10^3/UL (ref 1–3.4)
LYMPHS% (MANUAL): 19 % (ref 22–44)
MACROCYTES BLD QL SMEAR: (no result)
MCH RBC QN AUTO: 33.1 PG (ref 27–34.8)
MCHC RBC AUTO-ENTMCNC: 33.5 G/DL (ref 32.4–35.8)
MCV RBC AUTO: 98.6 FL (ref 80–100)
MD: YES
METAMYELOCYTES# (MANUAL): 0.39 X10^3/UL (ref 0–0)
METAMYELOCYTES% (MANUAL): 9 % (ref 0–1)
MONOS#(MANUAL): 0.6 X10^3/UL (ref 0.3–2.7)
MONOS% (MANUAL): 14 % (ref 2–9)
MYELOCYTES# (MANUAL): 0.13 X10^3/UL (ref 0–0)
MYELOCYTES% (MANUAL): 3 % (ref 0–0)
NEUTS BAND NFR BLD: 17 % (ref 0–7)
PLATELET # BLD AUTO: 191 X10^3/UL (ref 130–400)
PMV BLD AUTO: 7.9 FL (ref 7.4–10.4)
POLYCHROMASIA BLD QL SMEAR: (no result)
PROT SERPL-MCNC: 5.2 G/DL (ref 6.4–8.2)
RBC # BLD AUTO: 2.65 X10^6/UL (ref 3.82–5.3)
SEG#(MANUAL): 1.59 X10^3/UL (ref 1.8–6.8)
SEGS% (MANUAL): 37 % (ref 42–75)

## 2018-10-23 RX ADMIN — CHOLECALCIFEROL TAB 125 MCG (5000 UNIT) SCH UNIT: 125 TAB at 08:50

## 2018-10-23 RX ADMIN — Medication SCH MG: at 08:49

## 2018-10-23 RX ADMIN — TEMAZEPAM PRN MG: 15 CAPSULE ORAL at 20:00

## 2018-10-23 RX ADMIN — SODIUM CHLORIDE, PRESERVATIVE FREE SCH ML: 5 INJECTION INTRAVENOUS at 08:52

## 2018-10-23 RX ADMIN — LACTULOSE SCH GM: 10 SOLUTION ORAL at 08:52

## 2018-10-23 RX ADMIN — PANTOPRAZOLE SODIUM SCH MG: 40 TABLET, DELAYED RELEASE ORAL at 16:51

## 2018-10-23 RX ADMIN — OXYCODONE HYDROCHLORIDE PRN MG: 5 TABLET ORAL at 17:07

## 2018-10-23 RX ADMIN — HEPARIN SODIUM SCH UNITS: 5000 INJECTION, SOLUTION INTRAVENOUS; SUBCUTANEOUS at 20:00

## 2018-10-23 RX ADMIN — OXYCODONE HYDROCHLORIDE PRN MG: 5 TABLET ORAL at 22:30

## 2018-10-23 RX ADMIN — ALLOPURINOL SCH MG: 300 TABLET ORAL at 08:50

## 2018-10-23 RX ADMIN — PANTOPRAZOLE SODIUM SCH MG: 40 TABLET, DELAYED RELEASE ORAL at 07:41

## 2018-10-23 RX ADMIN — LACTULOSE SCH GM: 10 SOLUTION ORAL at 20:00

## 2018-10-23 RX ADMIN — B-COMPLEX W/ C & FOLIC ACID TAB SCH TAB: TAB at 08:50

## 2018-10-23 RX ADMIN — SODIUM CHLORIDE, PRESERVATIVE FREE SCH ML: 5 INJECTION INTRAVENOUS at 20:00

## 2018-10-23 RX ADMIN — ACYCLOVIR SCH MG: 800 TABLET ORAL at 08:51

## 2018-10-23 RX ADMIN — HEPARIN SODIUM SCH UNITS: 5000 INJECTION, SOLUTION INTRAVENOUS; SUBCUTANEOUS at 08:51

## 2018-10-23 RX ADMIN — MAGNESIUM OXIDE SCH MG: 400 TABLET ORAL at 08:49

## 2018-10-23 RX ADMIN — ACYCLOVIR SCH MG: 800 TABLET ORAL at 16:00

## 2018-10-24 VITALS — SYSTOLIC BLOOD PRESSURE: 142 MMHG | DIASTOLIC BLOOD PRESSURE: 85 MMHG

## 2018-10-24 VITALS — SYSTOLIC BLOOD PRESSURE: 94 MMHG | DIASTOLIC BLOOD PRESSURE: 64 MMHG

## 2018-10-24 VITALS — DIASTOLIC BLOOD PRESSURE: 71 MMHG | SYSTOLIC BLOOD PRESSURE: 116 MMHG

## 2018-10-24 LAB
<PLATELET ESTIMATE>: ADEQUATE
<PLT MORPHOLOGY>: (no result)
ALBUMIN SERPL-MCNC: 2.7 G/DL (ref 3.4–5)
ALP SERPL-CCNC: 90 U/L (ref 45–117)
ALT SERPL-CCNC: 26 U/L (ref 12–78)
ANION GAP SERPL CALC-SCNC: 7 MMOL/L (ref 5–15)
ANISOCYTOSIS BLD QL SMEAR: (no result)
BAND#(MANUAL): 0.48 X10^3/UL
BASOPHILS NFR BLD MANUAL: 2 % (ref 0–1)
BASOS#(MANUAL): 0.08 X10^3/UL (ref 0–0.1)
BILIRUB SERPL-MCNC: 0.1 MG/DL (ref 0.2–1)
CALCIUM SERPL-MCNC: 8.4 MG/DL (ref 8.5–10.1)
CHLORIDE SERPL-SCNC: 110 MMOL/L (ref 98–107)
CREAT SERPL-MCNC: 0.6 MG/DL (ref 0.55–1.02)
ERYTHROCYTE [DISTWIDTH] IN BLOOD BY AUTOMATED COUNT: 20.9 % (ref 9.6–15.2)
LYMPH#(MANUAL): 0.96 X10^3/UL (ref 1–3.4)
LYMPHS% (MANUAL): 24 % (ref 22–44)
MACROCYTES BLD QL SMEAR: (no result)
MCH RBC QN AUTO: 33.4 PG (ref 27–34.8)
MCHC RBC AUTO-ENTMCNC: 33.7 G/DL (ref 32.4–35.8)
MCV RBC AUTO: 99 FL (ref 80–100)
MD: YES
METAMYELOCYTES# (MANUAL): 0.32 X10^3/UL (ref 0–0)
METAMYELOCYTES% (MANUAL): 8 % (ref 0–1)
MONOS#(MANUAL): 0.16 X10^3/UL (ref 0.3–2.7)
MONOS% (MANUAL): 4 % (ref 2–9)
MYELOCYTES# (MANUAL): 0.32 X10^3/UL (ref 0–0)
MYELOCYTES% (MANUAL): 8 % (ref 0–0)
NEUTS BAND NFR BLD: 12 % (ref 0–7)
NRBC % (MANUAL): 2 % (ref 0–1)
PLATELET # BLD AUTO: 250 X10^3/UL (ref 130–400)
PMV BLD AUTO: 7.8 FL (ref 7.4–10.4)
POLYCHROMASIA BLD QL SMEAR: (no result)
PROT SERPL-MCNC: 5.5 G/DL (ref 6.4–8.2)
RBC # BLD AUTO: 2.67 X10^6/UL (ref 3.82–5.3)
SEG#(MANUAL): 1.68 X10^3/UL (ref 1.8–6.8)
SEGS% (MANUAL): 42 % (ref 42–75)

## 2018-10-24 RX ADMIN — OXYCODONE HYDROCHLORIDE PRN MG: 5 TABLET ORAL at 11:48

## 2018-10-24 RX ADMIN — CHOLECALCIFEROL TAB 125 MCG (5000 UNIT) SCH UNIT: 125 TAB at 08:48

## 2018-10-24 RX ADMIN — ALLOPURINOL SCH MG: 300 TABLET ORAL at 08:47

## 2018-10-24 RX ADMIN — LACTULOSE SCH GM: 10 SOLUTION ORAL at 08:43

## 2018-10-24 RX ADMIN — ONDANSETRON PRN MG: 4 TABLET, ORALLY DISINTEGRATING ORAL at 11:48

## 2018-10-24 RX ADMIN — PANTOPRAZOLE SODIUM SCH MG: 40 TABLET, DELAYED RELEASE ORAL at 08:48

## 2018-10-24 RX ADMIN — MAGNESIUM OXIDE SCH MG: 400 TABLET ORAL at 08:48

## 2018-10-24 RX ADMIN — Medication SCH MG: at 08:48

## 2018-10-24 RX ADMIN — HEPARIN SODIUM SCH UNITS: 5000 INJECTION, SOLUTION INTRAVENOUS; SUBCUTANEOUS at 08:47

## 2018-10-24 RX ADMIN — OXYCODONE HYDROCHLORIDE PRN MG: 5 TABLET ORAL at 06:25

## 2018-10-24 RX ADMIN — SODIUM CHLORIDE, PRESERVATIVE FREE SCH ML: 5 INJECTION INTRAVENOUS at 08:49

## 2018-10-24 RX ADMIN — ONDANSETRON PRN MG: 4 TABLET, ORALLY DISINTEGRATING ORAL at 06:25

## 2018-10-24 RX ADMIN — B-COMPLEX W/ C & FOLIC ACID TAB SCH TAB: TAB at 08:48

## 2018-11-02 ENCOUNTER — HOSPITAL ENCOUNTER (OUTPATIENT)
Dept: HOSPITAL 8 - OUT | Age: 58
Discharge: HOME | End: 2018-11-02
Attending: INTERNAL MEDICINE
Payer: COMMERCIAL

## 2018-11-02 VITALS — DIASTOLIC BLOOD PRESSURE: 76 MMHG | SYSTOLIC BLOOD PRESSURE: 130 MMHG

## 2018-11-02 VITALS — WEIGHT: 182.1 LBS | BODY MASS INDEX: 32.27 KG/M2 | HEIGHT: 63 IN

## 2018-11-02 DIAGNOSIS — Z88.0: ICD-10-CM

## 2018-11-02 DIAGNOSIS — C83.30: Primary | ICD-10-CM

## 2018-11-02 DIAGNOSIS — Z88.8: ICD-10-CM

## 2018-11-02 DIAGNOSIS — J44.9: ICD-10-CM

## 2018-11-02 PROCEDURE — 62270 DX LMBR SPI PNXR: CPT

## 2018-11-15 ENCOUNTER — HOSPITAL ENCOUNTER (OUTPATIENT)
Dept: HOSPITAL 8 - RAD | Age: 58
Discharge: HOME | End: 2018-11-15
Attending: INTERNAL MEDICINE
Payer: COMMERCIAL

## 2018-11-15 DIAGNOSIS — R91.8: ICD-10-CM

## 2018-11-15 DIAGNOSIS — C83.35: ICD-10-CM

## 2018-11-15 DIAGNOSIS — M48.54XA: ICD-10-CM

## 2018-11-15 DIAGNOSIS — C50.911: ICD-10-CM

## 2018-11-15 DIAGNOSIS — J43.2: Primary | ICD-10-CM

## 2018-11-15 PROCEDURE — 74177 CT ABD & PELVIS W/CONTRAST: CPT

## 2018-11-15 PROCEDURE — 71260 CT THORAX DX C+: CPT

## 2018-11-20 ENCOUNTER — HOSPITAL ENCOUNTER (OUTPATIENT)
Dept: HOSPITAL 8 - OUT | Age: 58
Discharge: HOME | End: 2018-11-20
Attending: INTERNAL MEDICINE
Payer: COMMERCIAL

## 2018-11-20 VITALS — DIASTOLIC BLOOD PRESSURE: 80 MMHG | SYSTOLIC BLOOD PRESSURE: 133 MMHG

## 2018-11-20 VITALS — WEIGHT: 178.13 LBS | BODY MASS INDEX: 31.56 KG/M2 | HEIGHT: 63 IN

## 2018-11-20 DIAGNOSIS — Z72.89: ICD-10-CM

## 2018-11-20 DIAGNOSIS — Z82.49: ICD-10-CM

## 2018-11-20 DIAGNOSIS — M54.5: ICD-10-CM

## 2018-11-20 DIAGNOSIS — C83.30: Primary | ICD-10-CM

## 2018-11-20 DIAGNOSIS — Z80.42: ICD-10-CM

## 2018-11-20 DIAGNOSIS — Z88.2: ICD-10-CM

## 2018-11-20 DIAGNOSIS — J44.9: ICD-10-CM

## 2018-11-20 DIAGNOSIS — Z79.899: ICD-10-CM

## 2018-11-20 DIAGNOSIS — Z88.0: ICD-10-CM

## 2018-11-20 DIAGNOSIS — Z87.891: ICD-10-CM

## 2018-11-20 DIAGNOSIS — Z79.1: ICD-10-CM

## 2018-11-20 DIAGNOSIS — Z88.1: ICD-10-CM

## 2018-11-20 PROCEDURE — 62270 DX LMBR SPI PNXR: CPT

## 2018-12-13 ENCOUNTER — HOSPITAL ENCOUNTER (OUTPATIENT)
Dept: HOSPITAL 8 - OUT | Age: 58
Discharge: HOME | End: 2018-12-13
Attending: INTERNAL MEDICINE
Payer: COMMERCIAL

## 2018-12-13 VITALS — SYSTOLIC BLOOD PRESSURE: 147 MMHG | DIASTOLIC BLOOD PRESSURE: 76 MMHG

## 2018-12-13 VITALS — WEIGHT: 180.34 LBS | HEIGHT: 63 IN | BODY MASS INDEX: 31.95 KG/M2

## 2018-12-13 DIAGNOSIS — Z88.1: ICD-10-CM

## 2018-12-13 DIAGNOSIS — C83.35: Primary | ICD-10-CM

## 2018-12-13 DIAGNOSIS — Z88.0: ICD-10-CM

## 2018-12-13 DIAGNOSIS — Z88.8: ICD-10-CM

## 2018-12-13 DIAGNOSIS — Z87.891: ICD-10-CM

## 2018-12-13 DIAGNOSIS — J44.9: ICD-10-CM

## 2018-12-13 PROCEDURE — 77003 FLUOROGUIDE FOR SPINE INJECT: CPT

## 2018-12-13 PROCEDURE — 62270 DX LMBR SPI PNXR: CPT

## 2019-01-07 ENCOUNTER — HOSPITAL ENCOUNTER (OUTPATIENT)
Dept: HOSPITAL 8 - OUT | Age: 59
Discharge: HOME | End: 2019-01-07
Attending: INTERNAL MEDICINE
Payer: COMMERCIAL

## 2019-01-07 DIAGNOSIS — C83.35: Primary | ICD-10-CM

## 2019-01-07 DIAGNOSIS — D50.9: ICD-10-CM

## 2019-01-07 DIAGNOSIS — Z88.8: ICD-10-CM

## 2019-01-07 DIAGNOSIS — Z88.1: ICD-10-CM

## 2019-01-07 DIAGNOSIS — Z85.3: ICD-10-CM

## 2019-01-07 DIAGNOSIS — Z88.0: ICD-10-CM

## 2019-01-07 LAB
BASOPHILS # BLD AUTO: 0.07 X10^3/UL (ref 0–0.1)
BASOPHILS NFR BLD AUTO: 1 % (ref 0–1)
EOSINOPHIL # BLD AUTO: 0.01 X10^3/UL (ref 0–0.4)
EOSINOPHIL NFR BLD AUTO: 0 % (ref 1–7)
ERYTHROCYTE [DISTWIDTH] IN BLOOD BY AUTOMATED COUNT: 15.4 % (ref 9.6–15.2)
LYMPHOCYTES # BLD AUTO: 0.22 X10^3/UL (ref 1–3.4)
LYMPHOCYTES NFR BLD AUTO: 2 % (ref 22–44)
MCH RBC QN AUTO: 31.9 PG (ref 27–34.8)
MCHC RBC AUTO-ENTMCNC: 32.6 G/DL (ref 32.4–35.8)
MCV RBC AUTO: 97.9 FL (ref 80–100)
MD: (no result)
MONOCYTES # BLD AUTO: 0.07 X10^3/UL (ref 0.2–0.8)
MONOCYTES NFR BLD AUTO: 1 % (ref 2–9)
NEUTROPHILS # BLD AUTO: 14.22 X10^3/UL (ref 1.8–6.8)
NEUTROPHILS NFR BLD AUTO: 98 % (ref 42–75)
PLATELET # BLD AUTO: 133 X10^3/UL (ref 130–400)
PMV BLD AUTO: 8.1 FL (ref 7.4–10.4)
RBC # BLD AUTO: 3.33 X10^6/UL (ref 3.82–5.3)

## 2019-01-07 PROCEDURE — 77003 FLUOROGUIDE FOR SPINE INJECT: CPT

## 2019-01-07 PROCEDURE — 62270 DX LMBR SPI PNXR: CPT

## 2019-01-07 PROCEDURE — 36415 COLL VENOUS BLD VENIPUNCTURE: CPT

## 2019-01-07 PROCEDURE — 85025 COMPLETE CBC W/AUTO DIFF WBC: CPT

## 2019-01-30 ENCOUNTER — HOSPITAL ENCOUNTER (OUTPATIENT)
Dept: HOSPITAL 8 - PETCFH | Age: 59
Discharge: HOME | End: 2019-01-30
Attending: INTERNAL MEDICINE
Payer: COMMERCIAL

## 2019-01-30 DIAGNOSIS — M48.54XA: Primary | ICD-10-CM

## 2019-01-30 DIAGNOSIS — C50.911: ICD-10-CM

## 2019-01-30 DIAGNOSIS — C83.35: ICD-10-CM

## 2019-01-30 DIAGNOSIS — M48.56XA: ICD-10-CM

## 2019-01-30 DIAGNOSIS — G95.89: ICD-10-CM

## 2019-01-30 PROCEDURE — A9552 F18 FDG: HCPCS

## 2019-01-30 PROCEDURE — 78815 PET IMAGE W/CT SKULL-THIGH: CPT

## 2019-02-12 ENCOUNTER — HOSPITAL ENCOUNTER (OUTPATIENT)
Dept: HOSPITAL 8 - OUT | Age: 59
Discharge: HOME | End: 2019-02-12
Attending: INTERNAL MEDICINE
Payer: COMMERCIAL

## 2019-02-12 VITALS — BODY MASS INDEX: 30.9 KG/M2 | HEIGHT: 63 IN | WEIGHT: 174.39 LBS

## 2019-02-12 VITALS — DIASTOLIC BLOOD PRESSURE: 80 MMHG | SYSTOLIC BLOOD PRESSURE: 131 MMHG

## 2019-02-12 DIAGNOSIS — C50.911: ICD-10-CM

## 2019-02-12 DIAGNOSIS — Z88.0: ICD-10-CM

## 2019-02-12 DIAGNOSIS — Z88.1: ICD-10-CM

## 2019-02-12 DIAGNOSIS — Z45.2: Primary | ICD-10-CM

## 2019-02-12 DIAGNOSIS — Z88.8: ICD-10-CM

## 2019-02-12 PROCEDURE — 77001 FLUOROGUIDE FOR VEIN DEVICE: CPT

## 2019-02-12 PROCEDURE — 36590 REMOVAL TUNNELED CV CATH: CPT

## 2019-04-30 ENCOUNTER — HOSPITAL ENCOUNTER (INPATIENT)
Dept: HOSPITAL 8 - ED | Age: 59
LOS: 36 days | Discharge: HOME | DRG: 871 | End: 2019-06-05
Attending: HOSPITALIST | Admitting: HOSPITALIST
Payer: COMMERCIAL

## 2019-04-30 VITALS — BODY MASS INDEX: 30.94 KG/M2 | WEIGHT: 174.61 LBS | HEIGHT: 63 IN

## 2019-04-30 DIAGNOSIS — Z88.0: ICD-10-CM

## 2019-04-30 DIAGNOSIS — R50.81: ICD-10-CM

## 2019-04-30 DIAGNOSIS — E43: ICD-10-CM

## 2019-04-30 DIAGNOSIS — C79.51: ICD-10-CM

## 2019-04-30 DIAGNOSIS — Z92.3: ICD-10-CM

## 2019-04-30 DIAGNOSIS — E83.42: ICD-10-CM

## 2019-04-30 DIAGNOSIS — Z85.3: ICD-10-CM

## 2019-04-30 DIAGNOSIS — Z87.891: ICD-10-CM

## 2019-04-30 DIAGNOSIS — C92.00: ICD-10-CM

## 2019-04-30 DIAGNOSIS — F32.9: ICD-10-CM

## 2019-04-30 DIAGNOSIS — B02.29: ICD-10-CM

## 2019-04-30 DIAGNOSIS — K59.00: ICD-10-CM

## 2019-04-30 DIAGNOSIS — J32.9: ICD-10-CM

## 2019-04-30 DIAGNOSIS — A41.9: Primary | ICD-10-CM

## 2019-04-30 DIAGNOSIS — D69.3: ICD-10-CM

## 2019-04-30 DIAGNOSIS — K62.5: ICD-10-CM

## 2019-04-30 DIAGNOSIS — K12.1: ICD-10-CM

## 2019-04-30 DIAGNOSIS — Z83.3: ICD-10-CM

## 2019-04-30 DIAGNOSIS — K12.30: ICD-10-CM

## 2019-04-30 DIAGNOSIS — J30.2: ICD-10-CM

## 2019-04-30 DIAGNOSIS — Z86.19: ICD-10-CM

## 2019-04-30 DIAGNOSIS — Z82.5: ICD-10-CM

## 2019-04-30 DIAGNOSIS — R65.20: ICD-10-CM

## 2019-04-30 DIAGNOSIS — J44.9: ICD-10-CM

## 2019-04-30 DIAGNOSIS — Z90.710: ICD-10-CM

## 2019-04-30 DIAGNOSIS — Z81.8: ICD-10-CM

## 2019-04-30 DIAGNOSIS — K21.9: ICD-10-CM

## 2019-04-30 DIAGNOSIS — M48.56XA: ICD-10-CM

## 2019-04-30 DIAGNOSIS — D61.818: ICD-10-CM

## 2019-04-30 DIAGNOSIS — E87.6: ICD-10-CM

## 2019-04-30 DIAGNOSIS — T45.1X5A: ICD-10-CM

## 2019-04-30 DIAGNOSIS — Z15.01: ICD-10-CM

## 2019-04-30 DIAGNOSIS — C83.30: ICD-10-CM

## 2019-04-30 DIAGNOSIS — Z88.2: ICD-10-CM

## 2019-04-30 DIAGNOSIS — K64.9: ICD-10-CM

## 2019-04-30 LAB
ALBUMIN SERPL-MCNC: 2.9 G/DL (ref 3.4–5)
ALP SERPL-CCNC: 83 U/L (ref 45–117)
ALT SERPL-CCNC: 20 U/L (ref 12–78)
ANION GAP SERPL CALC-SCNC: 9 MMOL/L (ref 5–15)
BILIRUB SERPL-MCNC: 0.8 MG/DL (ref 0.2–1)
CALCIUM SERPL-MCNC: 8.8 MG/DL (ref 8.5–10.1)
CHLORIDE SERPL-SCNC: 98 MMOL/L (ref 98–107)
CREAT SERPL-MCNC: 0.86 MG/DL (ref 0.55–1.02)
CULTURE INDICATED?: NO
ERYTHROCYTE [DISTWIDTH] IN BLOOD BY AUTOMATED COUNT: 20.9 % (ref 9.6–15.2)
MCH RBC QN AUTO: 34.7 PG (ref 27–34.8)
MCHC RBC AUTO-ENTMCNC: 36.3 G/DL (ref 32.4–35.8)
MCV RBC AUTO: 95.6 FL (ref 80–100)
MICROSCOPIC: (no result)
PLATELET # BLD AUTO: 19 X10^3/UL (ref 130–400)
PMV BLD AUTO: 8.9 FL (ref 7.4–10.4)
PROT SERPL-MCNC: 6.9 G/DL (ref 6.4–8.2)
RBC # BLD AUTO: 1.76 X10^6/UL (ref 3.82–5.3)

## 2019-04-30 PROCEDURE — 38222 DX BONE MARROW BX & ASPIR: CPT

## 2019-04-30 PROCEDURE — 86038 ANTINUCLEAR ANTIBODIES: CPT

## 2019-04-30 PROCEDURE — 82746 ASSAY OF FOLIC ACID SERUM: CPT

## 2019-04-30 PROCEDURE — 88305 TISSUE EXAM BY PATHOLOGIST: CPT

## 2019-04-30 PROCEDURE — 80202 ASSAY OF VANCOMYCIN: CPT

## 2019-04-30 PROCEDURE — 83735 ASSAY OF MAGNESIUM: CPT

## 2019-04-30 PROCEDURE — 86923 COMPATIBILITY TEST ELECTRIC: CPT

## 2019-04-30 PROCEDURE — 36430 TRANSFUSION BLD/BLD COMPNT: CPT

## 2019-04-30 PROCEDURE — 88360 TUMOR IMMUNOHISTOCHEM/MANUAL: CPT

## 2019-04-30 PROCEDURE — 84145 PROCALCITONIN (PCT): CPT

## 2019-04-30 PROCEDURE — 93306 TTE W/DOPPLER COMPLETE: CPT

## 2019-04-30 PROCEDURE — 86039 ANTINUCLEAR ANTIBODIES (ANA): CPT

## 2019-04-30 PROCEDURE — 82550 ASSAY OF CK (CPK): CPT

## 2019-04-30 PROCEDURE — 88280 CHROMOSOME KARYOTYPE STUDY: CPT

## 2019-04-30 PROCEDURE — 85097 BONE MARROW INTERPRETATION: CPT

## 2019-04-30 PROCEDURE — C9113 INJ PANTOPRAZOLE SODIUM, VIA: HCPCS

## 2019-04-30 PROCEDURE — 82248 BILIRUBIN DIRECT: CPT

## 2019-04-30 PROCEDURE — 36415 COLL VENOUS BLD VENIPUNCTURE: CPT

## 2019-04-30 PROCEDURE — 71045 X-RAY EXAM CHEST 1 VIEW: CPT

## 2019-04-30 PROCEDURE — 74160 CT ABDOMEN W/CONTRAST: CPT

## 2019-04-30 PROCEDURE — 77012 CT SCAN FOR NEEDLE BIOPSY: CPT

## 2019-04-30 PROCEDURE — 86140 C-REACTIVE PROTEIN: CPT

## 2019-04-30 PROCEDURE — 83615 LACTATE (LD) (LDH) ENZYME: CPT

## 2019-04-30 PROCEDURE — 84100 ASSAY OF PHOSPHORUS: CPT

## 2019-04-30 PROCEDURE — 93005 ELECTROCARDIOGRAM TRACING: CPT

## 2019-04-30 PROCEDURE — 80053 COMPREHEN METABOLIC PANEL: CPT

## 2019-04-30 PROCEDURE — 85027 COMPLETE CBC AUTOMATED: CPT

## 2019-04-30 PROCEDURE — C1751 CATH, INF, PER/CENT/MIDLINE: HCPCS

## 2019-04-30 PROCEDURE — 88311 DECALCIFY TISSUE: CPT

## 2019-04-30 PROCEDURE — 87040 BLOOD CULTURE FOR BACTERIA: CPT

## 2019-04-30 PROCEDURE — 88313 SPECIAL STAINS GROUP 2: CPT

## 2019-04-30 PROCEDURE — 87086 URINE CULTURE/COLONY COUNT: CPT

## 2019-04-30 PROCEDURE — 99156 MOD SED OTH PHYS/QHP 5/>YRS: CPT

## 2019-04-30 PROCEDURE — 86644 CMV ANTIBODY: CPT

## 2019-04-30 PROCEDURE — 88264 CHROMOSOME ANALYSIS 20-25: CPT

## 2019-04-30 PROCEDURE — 96375 TX/PRO/DX INJ NEW DRUG ADDON: CPT

## 2019-04-30 PROCEDURE — 83605 ASSAY OF LACTIC ACID: CPT

## 2019-04-30 PROCEDURE — 36573 INSJ PICC RS&I 5 YR+: CPT

## 2019-04-30 PROCEDURE — 86645 CMV ANTIBODY IGM: CPT

## 2019-04-30 PROCEDURE — 81001 URINALYSIS AUTO W/SCOPE: CPT

## 2019-04-30 PROCEDURE — 85060 BLOOD SMEAR INTERPRETATION: CPT

## 2019-04-30 PROCEDURE — 96374 THER/PROPH/DIAG INJ IV PUSH: CPT

## 2019-04-30 PROCEDURE — 87305 ASPERGILLUS AG IA: CPT

## 2019-04-30 PROCEDURE — 87449 NOS EACH ORGANISM AG IA: CPT

## 2019-04-30 PROCEDURE — 84550 ASSAY OF BLOOD/URIC ACID: CPT

## 2019-04-30 PROCEDURE — 88237 TISSUE CULTURE BONE MARROW: CPT

## 2019-04-30 PROCEDURE — 70487 CT MAXILLOFACIAL W/DYE: CPT

## 2019-04-30 PROCEDURE — 87806 HIV AG W/HIV1&2 ANTB W/OPTIC: CPT

## 2019-04-30 PROCEDURE — P9040 RBC LEUKOREDUCED IRRADIATED: HCPCS

## 2019-04-30 PROCEDURE — 85025 COMPLETE CBC W/AUTO DIFF WBC: CPT

## 2019-04-30 PROCEDURE — 71260 CT THORAX DX C+: CPT

## 2019-04-30 PROCEDURE — 82607 VITAMIN B-12: CPT

## 2019-04-30 PROCEDURE — 85651 RBC SED RATE NONAUTOMATED: CPT

## 2019-04-30 PROCEDURE — 80074 ACUTE HEPATITIS PANEL: CPT

## 2019-04-30 PROCEDURE — 99157 MOD SED OTHER PHYS/QHP EA: CPT

## 2019-04-30 PROCEDURE — 86900 BLOOD TYPING SEROLOGIC ABO: CPT

## 2019-04-30 PROCEDURE — 86850 RBC ANTIBODY SCREEN: CPT

## 2019-04-30 PROCEDURE — P9037 PLATE PHERES LEUKOREDU IRRAD: HCPCS

## 2019-04-30 PROCEDURE — G0475 HIV COMBINATION ASSAY: HCPCS

## 2019-05-01 VITALS — DIASTOLIC BLOOD PRESSURE: 57 MMHG | SYSTOLIC BLOOD PRESSURE: 95 MMHG

## 2019-05-01 VITALS — SYSTOLIC BLOOD PRESSURE: 95 MMHG | DIASTOLIC BLOOD PRESSURE: 59 MMHG

## 2019-05-01 VITALS — SYSTOLIC BLOOD PRESSURE: 94 MMHG | DIASTOLIC BLOOD PRESSURE: 56 MMHG

## 2019-05-01 VITALS — DIASTOLIC BLOOD PRESSURE: 57 MMHG | SYSTOLIC BLOOD PRESSURE: 92 MMHG

## 2019-05-01 VITALS — SYSTOLIC BLOOD PRESSURE: 91 MMHG | DIASTOLIC BLOOD PRESSURE: 58 MMHG

## 2019-05-01 VITALS — SYSTOLIC BLOOD PRESSURE: 129 MMHG | DIASTOLIC BLOOD PRESSURE: 55 MMHG

## 2019-05-01 VITALS — SYSTOLIC BLOOD PRESSURE: 123 MMHG | DIASTOLIC BLOOD PRESSURE: 60 MMHG

## 2019-05-01 VITALS — DIASTOLIC BLOOD PRESSURE: 63 MMHG | SYSTOLIC BLOOD PRESSURE: 104 MMHG

## 2019-05-01 VITALS — SYSTOLIC BLOOD PRESSURE: 104 MMHG | DIASTOLIC BLOOD PRESSURE: 63 MMHG

## 2019-05-01 VITALS — DIASTOLIC BLOOD PRESSURE: 58 MMHG | SYSTOLIC BLOOD PRESSURE: 96 MMHG

## 2019-05-01 VITALS — DIASTOLIC BLOOD PRESSURE: 63 MMHG | SYSTOLIC BLOOD PRESSURE: 116 MMHG

## 2019-05-01 VITALS — DIASTOLIC BLOOD PRESSURE: 79 MMHG | SYSTOLIC BLOOD PRESSURE: 132 MMHG

## 2019-05-01 VITALS — SYSTOLIC BLOOD PRESSURE: 92 MMHG | DIASTOLIC BLOOD PRESSURE: 57 MMHG

## 2019-05-01 VITALS — DIASTOLIC BLOOD PRESSURE: 69 MMHG | SYSTOLIC BLOOD PRESSURE: 113 MMHG

## 2019-05-01 LAB
<PLATELET ESTIMATE>: (no result)
<PLATELET ESTIMATE>: (no result)
<PLT MORPHOLOGY>: (no result)
<PLT MORPHOLOGY>: (no result)
ALBUMIN SERPL-MCNC: 2.6 G/DL (ref 3.4–5)
ALP SERPL-CCNC: 75 U/L (ref 45–117)
ALT SERPL-CCNC: 19 U/L (ref 12–78)
ANION GAP SERPL CALC-SCNC: 5 MMOL/L (ref 5–15)
ANISOCYTOSIS BLD QL SMEAR: (no result)
ANISOCYTOSIS BLD QL SMEAR: (no result)
BAND#(MANUAL): 0.02 X10^3/UL
BAND#(MANUAL): 0.06 X10^3/UL
BILIRUB SERPL-MCNC: 1.1 MG/DL (ref 0.2–1)
CALCIUM SERPL-MCNC: 9.2 MG/DL (ref 8.5–10.1)
CHLORIDE SERPL-SCNC: 105 MMOL/L (ref 98–107)
CREAT SERPL-MCNC: 0.76 MG/DL (ref 0.55–1.02)
ERYTHROCYTE [DISTWIDTH] IN BLOOD BY AUTOMATED COUNT: 17 % (ref 9.6–15.2)
LYMPH#(MANUAL): 0.18 X10^3/UL (ref 1–3.4)
LYMPH#(MANUAL): 0.25 X10^3/UL (ref 1–3.4)
LYMPHS% (MANUAL): 46 % (ref 22–44)
LYMPHS% (MANUAL): 62 % (ref 22–44)
MACROCYTES BLD QL SMEAR: (no result)
MACROCYTES BLD QL SMEAR: (no result)
MCH RBC QN AUTO: 32.7 PG (ref 27–34.8)
MCHC RBC AUTO-ENTMCNC: 34.9 G/DL (ref 32.4–35.8)
MCV RBC AUTO: 93.7 FL (ref 80–100)
MD: YES
MD: YES
MONOS#(MANUAL): 0.01 X10^3/UL (ref 0.3–2.7)
MONOS% (MANUAL): 2 % (ref 2–9)
NEUTS BAND NFR BLD: 14 % (ref 0–7)
NEUTS BAND NFR BLD: 6 % (ref 0–7)
OTHER CELLS # (MANUAL): 0.01 X10^3/UL (ref 0–0)
OTHER CELLS # (MANUAL): 0.01 X10^3/UL (ref 0–0)
OTHER CELLS % (MANUAL): 2 % (ref 0–0)
OTHER CELLS % (MANUAL): 2 % (ref 0–0)
OVALOCYTES BLD QL SMEAR: (no result)
OVALOCYTES BLD QL SMEAR: (no result)
PLATELET # BLD AUTO: 15 X10^3/UL (ref 130–400)
PMV BLD AUTO: 9.3 FL (ref 7.4–10.4)
PROT SERPL-MCNC: 6.2 G/DL (ref 6.4–8.2)
RBC # BLD AUTO: 2.6 X10^6/UL (ref 3.82–5.3)
SEG#(MANUAL): 0.11 X10^3/UL (ref 1.8–6.8)
SEG#(MANUAL): 0.15 X10^3/UL (ref 1.8–6.8)
SEGS% (MANUAL): 28 % (ref 42–75)
SEGS% (MANUAL): 38 % (ref 42–75)

## 2019-05-01 PROCEDURE — 30233N1 TRANSFUSION OF NONAUTOLOGOUS RED BLOOD CELLS INTO PERIPHERAL VEIN, PERCUTANEOUS APPROACH: ICD-10-PCS | Performed by: HOSPITALIST

## 2019-05-01 RX ADMIN — ACETAMINOPHEN PRN MG: 325 TABLET, FILM COATED ORAL at 01:51

## 2019-05-01 RX ADMIN — ONDANSETRON PRN MG: 2 INJECTION, SOLUTION INTRAMUSCULAR; INTRAVENOUS at 11:59

## 2019-05-01 RX ADMIN — ACETAMINOPHEN PRN MG: 325 TABLET, FILM COATED ORAL at 19:34

## 2019-05-01 RX ADMIN — DEXTROSE, SODIUM CHLORIDE, AND POTASSIUM CHLORIDE SCH MLS/HR: 5; .45; .15 INJECTION INTRAVENOUS at 17:22

## 2019-05-01 RX ADMIN — ACETAMINOPHEN PRN MG: 325 TABLET, FILM COATED ORAL at 12:11

## 2019-05-01 RX ADMIN — MEROPENEM SCH MLS/HR: 1 INJECTION, POWDER, FOR SOLUTION INTRAVENOUS at 12:00

## 2019-05-01 RX ADMIN — DEXTROSE, SODIUM CHLORIDE, AND POTASSIUM CHLORIDE SCH MLS/HR: 5; .45; .15 INJECTION INTRAVENOUS at 09:22

## 2019-05-01 RX ADMIN — FLUCONAZOLE, SODIUM CHLORIDE SCH MLS/HR: 2 INJECTION INTRAVENOUS at 13:42

## 2019-05-01 RX ADMIN — HYDROMORPHONE HYDROCHLORIDE PRN MG: 2 INJECTION INTRAMUSCULAR; INTRAVENOUS; SUBCUTANEOUS at 21:53

## 2019-05-01 RX ADMIN — DEXTROSE, SODIUM CHLORIDE, AND POTASSIUM CHLORIDE SCH MLS/HR: 5; .45; .15 INJECTION INTRAVENOUS at 02:16

## 2019-05-01 RX ADMIN — VANCOMYCIN HYDROCHLORIDE SCH MLS/HR: 1 INJECTION, POWDER, LYOPHILIZED, FOR SOLUTION INTRAVENOUS at 17:18

## 2019-05-01 RX ADMIN — DIPHENHYDRAMINE HYDROCHLORIDE PRN MG: 25 CAPSULE ORAL at 01:51

## 2019-05-01 RX ADMIN — ONDANSETRON PRN MG: 2 INJECTION, SOLUTION INTRAMUSCULAR; INTRAVENOUS at 21:13

## 2019-05-01 RX ADMIN — MEROPENEM SCH MLS/HR: 1 INJECTION, POWDER, FOR SOLUTION INTRAVENOUS at 19:35

## 2019-05-02 VITALS — SYSTOLIC BLOOD PRESSURE: 127 MMHG | DIASTOLIC BLOOD PRESSURE: 72 MMHG

## 2019-05-02 VITALS — SYSTOLIC BLOOD PRESSURE: 118 MMHG | DIASTOLIC BLOOD PRESSURE: 65 MMHG

## 2019-05-02 VITALS — SYSTOLIC BLOOD PRESSURE: 125 MMHG | DIASTOLIC BLOOD PRESSURE: 69 MMHG

## 2019-05-02 VITALS — SYSTOLIC BLOOD PRESSURE: 110 MMHG | DIASTOLIC BLOOD PRESSURE: 68 MMHG

## 2019-05-02 VITALS — DIASTOLIC BLOOD PRESSURE: 63 MMHG | SYSTOLIC BLOOD PRESSURE: 107 MMHG

## 2019-05-02 LAB
<PLATELET ESTIMATE>: (no result)
<PLT MORPHOLOGY>: (no result)
ALBUMIN SERPL-MCNC: 2.1 G/DL (ref 3.4–5)
ALP SERPL-CCNC: 85 U/L (ref 45–117)
ALT SERPL-CCNC: 36 U/L (ref 12–78)
ANA SER QL: POSITIVE
ANA TITR SER IF: (no result) {TITER}
ANION GAP SERPL CALC-SCNC: 7 MMOL/L (ref 5–15)
ANISOCYTOSIS BLD QL SMEAR: (no result)
ANTI-NUCLEAR ANTIBODY PATTERN: (no result)
BAND#(MANUAL): 0.01 X10^3/UL
BILIRUB SERPL-MCNC: 1 MG/DL (ref 0.2–1)
CALCIUM SERPL-MCNC: 8.2 MG/DL (ref 8.5–10.1)
CHLORIDE SERPL-SCNC: 105 MMOL/L (ref 98–107)
CREAT SERPL-MCNC: 0.75 MG/DL (ref 0.55–1.02)
EOS#(MANUAL): 0 X10^3/UL (ref 0–0.4)
EOS% (MANUAL): 2 % (ref 1–7)
ERYTHROCYTE [DISTWIDTH] IN BLOOD BY AUTOMATED COUNT: 17.4 % (ref 9.6–15.2)
ERYTHROCYTE [SEDIMENTATION RATE] IN BLOOD BY WESTERGREN METHOD: > 120 MM/HR (ref 0–20)
FOLATE SERPL-MCNC: > 20 NG/ML (ref 3.1–17.5)
HCT (SEDRATE): 19.7 % (ref 34.6–47.8)
LYMPH#(MANUAL): 0.1 X10^3/UL (ref 1–3.4)
LYMPHS% (MANUAL): 50 % (ref 22–44)
MACROCYTES BLD QL SMEAR: (no result)
MCH RBC QN AUTO: 33.4 PG (ref 27–34.8)
MCHC RBC AUTO-ENTMCNC: 35.3 G/DL (ref 32.4–35.8)
MCV RBC AUTO: 94.7 FL (ref 80–100)
MD: YES
MONOS#(MANUAL): 0.02 X10^3/UL (ref 0.3–2.7)
MONOS% (MANUAL): 8 % (ref 2–9)
NEUTS BAND NFR BLD: 4 % (ref 0–7)
OTHER CELLS # (MANUAL): 0 X10^3/UL (ref 0–0)
OTHER CELLS % (MANUAL): 2 % (ref 0–0)
PLATELET # BLD AUTO: 12 X10^3/UL (ref 130–400)
PMV BLD AUTO: 7.7 FL (ref 7.4–10.4)
PROT SERPL-MCNC: 5.4 G/DL (ref 6.4–8.2)
RBC # BLD AUTO: 2.25 X10^6/UL (ref 3.82–5.3)
SEG#(MANUAL): 0.07 X10^3/UL (ref 1.8–6.8)
SEGS% (MANUAL): 34 % (ref 42–75)

## 2019-05-02 PROCEDURE — 07DR3ZX EXTRACTION OF ILIAC BONE MARROW, PERCUTANEOUS APPROACH, DIAGNOSTIC: ICD-10-PCS | Performed by: RADIOLOGY

## 2019-05-02 RX ADMIN — MEROPENEM SCH MLS/HR: 1 INJECTION, POWDER, FOR SOLUTION INTRAVENOUS at 19:47

## 2019-05-02 RX ADMIN — PANTOPRAZOLE SODIUM SCH MG: 40 TABLET, DELAYED RELEASE ORAL at 06:23

## 2019-05-02 RX ADMIN — DEXTROSE, SODIUM CHLORIDE, AND POTASSIUM CHLORIDE SCH MLS/HR: 5; .45; .15 INJECTION INTRAVENOUS at 01:55

## 2019-05-02 RX ADMIN — MORPHINE SULFATE PRN MG: 10 INJECTION INTRAVENOUS at 01:06

## 2019-05-02 RX ADMIN — ACETAMINOPHEN PRN MG: 325 TABLET, FILM COATED ORAL at 00:41

## 2019-05-02 RX ADMIN — MORPHINE SULFATE PRN MG: 10 INJECTION INTRAVENOUS at 23:03

## 2019-05-02 RX ADMIN — ACETAMINOPHEN PRN MG: 325 TABLET, FILM COATED ORAL at 06:23

## 2019-05-02 RX ADMIN — FLUCONAZOLE, SODIUM CHLORIDE SCH MLS/HR: 2 INJECTION INTRAVENOUS at 13:45

## 2019-05-02 RX ADMIN — ACETAMINOPHEN PRN MG: 325 TABLET, FILM COATED ORAL at 11:34

## 2019-05-02 RX ADMIN — ACETAMINOPHEN PRN MG: 325 TABLET, FILM COATED ORAL at 21:03

## 2019-05-02 RX ADMIN — MORPHINE SULFATE PRN MG: 10 INJECTION INTRAVENOUS at 19:49

## 2019-05-02 RX ADMIN — DEXTROSE, SODIUM CHLORIDE, AND POTASSIUM CHLORIDE SCH MLS/HR: 5; .45; .15 INJECTION INTRAVENOUS at 13:45

## 2019-05-02 RX ADMIN — DIPHENHYDRAMINE HYDROCHLORIDE PRN MG: 25 CAPSULE ORAL at 23:02

## 2019-05-02 RX ADMIN — ONDANSETRON PRN MG: 2 INJECTION, SOLUTION INTRAMUSCULAR; INTRAVENOUS at 19:47

## 2019-05-02 RX ADMIN — MEROPENEM SCH MLS/HR: 1 INJECTION, POWDER, FOR SOLUTION INTRAVENOUS at 10:27

## 2019-05-02 RX ADMIN — VANCOMYCIN HYDROCHLORIDE SCH MLS/HR: 1 INJECTION, POWDER, LYOPHILIZED, FOR SOLUTION INTRAVENOUS at 12:05

## 2019-05-02 RX ADMIN — MEROPENEM SCH MLS/HR: 1 INJECTION, POWDER, FOR SOLUTION INTRAVENOUS at 03:50

## 2019-05-03 VITALS — SYSTOLIC BLOOD PRESSURE: 119 MMHG | DIASTOLIC BLOOD PRESSURE: 70 MMHG

## 2019-05-03 VITALS — SYSTOLIC BLOOD PRESSURE: 113 MMHG | DIASTOLIC BLOOD PRESSURE: 66 MMHG

## 2019-05-03 VITALS — SYSTOLIC BLOOD PRESSURE: 98 MMHG | DIASTOLIC BLOOD PRESSURE: 59 MMHG

## 2019-05-03 VITALS — SYSTOLIC BLOOD PRESSURE: 137 MMHG | DIASTOLIC BLOOD PRESSURE: 78 MMHG

## 2019-05-03 VITALS — DIASTOLIC BLOOD PRESSURE: 59 MMHG | SYSTOLIC BLOOD PRESSURE: 98 MMHG

## 2019-05-03 VITALS — SYSTOLIC BLOOD PRESSURE: 98 MMHG | DIASTOLIC BLOOD PRESSURE: 62 MMHG

## 2019-05-03 VITALS — DIASTOLIC BLOOD PRESSURE: 62 MMHG | SYSTOLIC BLOOD PRESSURE: 114 MMHG

## 2019-05-03 LAB
<PLATELET ESTIMATE>: (no result)
<PLT MORPHOLOGY>: (no result)
ALBUMIN SERPL-MCNC: 1.8 G/DL (ref 3.4–5)
ALP SERPL-CCNC: 117 U/L (ref 45–117)
ALT SERPL-CCNC: 33 U/L (ref 12–78)
ANION GAP SERPL CALC-SCNC: 7 MMOL/L (ref 5–15)
ANISOCYTOSIS BLD QL SMEAR: (no result)
BAND#(MANUAL): 0.01 X10^3/UL
BILIRUB SERPL-MCNC: 0.5 MG/DL (ref 0.2–1)
CALCIUM SERPL-MCNC: 8 MG/DL (ref 8.5–10.1)
CHLORIDE SERPL-SCNC: 106 MMOL/L (ref 98–107)
CREAT SERPL-MCNC: 0.69 MG/DL (ref 0.55–1.02)
EOS#(MANUAL): 0.01 X10^3/UL (ref 0–0.4)
EOS% (MANUAL): 2 % (ref 1–7)
ERYTHROCYTE [DISTWIDTH] IN BLOOD BY AUTOMATED COUNT: 16.6 % (ref 9.6–15.2)
LYMPH#(MANUAL): 0.17 X10^3/UL (ref 1–3.4)
LYMPHS% (MANUAL): 57 % (ref 22–44)
MACROCYTES BLD QL SMEAR: (no result)
MCH RBC QN AUTO: 33.2 PG (ref 27–34.8)
MCHC RBC AUTO-ENTMCNC: 35.2 G/DL (ref 32.4–35.8)
MCV RBC AUTO: 94.2 FL (ref 80–100)
MD: YES
NEUTS BAND NFR BLD: 3 % (ref 0–7)
OVALOCYTES BLD QL SMEAR: (no result)
PLATELET # BLD AUTO: 11 X10^3/UL (ref 130–400)
PMV BLD AUTO: 8.9 FL (ref 7.4–10.4)
PROT SERPL-MCNC: 5.1 G/DL (ref 6.4–8.2)
RBC # BLD AUTO: 1.97 X10^6/UL (ref 3.82–5.3)
SEG#(MANUAL): 0.11 X10^3/UL (ref 1.8–6.8)
SEGS% (MANUAL): 38 % (ref 42–75)
VANCOMYCIN,TROUGH: 6.3 MCG/ML (ref 5–10)

## 2019-05-03 RX ADMIN — MENTHOL, METHYL SALICYLATE SCH APPLIC: 10; 15 CREAM TOPICAL at 16:18

## 2019-05-03 RX ADMIN — MORPHINE SULFATE PRN MG: 10 INJECTION INTRAVENOUS at 21:15

## 2019-05-03 RX ADMIN — VANCOMYCIN HYDROCHLORIDE SCH MLS/HR: 1 INJECTION, POWDER, LYOPHILIZED, FOR SOLUTION INTRAVENOUS at 06:44

## 2019-05-03 RX ADMIN — MENTHOL, METHYL SALICYLATE SCH APPLIC: 10; 15 CREAM TOPICAL at 21:15

## 2019-05-03 RX ADMIN — ACETAMINOPHEN PRN MG: 325 TABLET, FILM COATED ORAL at 12:43

## 2019-05-03 RX ADMIN — MORPHINE SULFATE PRN MG: 10 INJECTION INTRAVENOUS at 16:26

## 2019-05-03 RX ADMIN — DIPHENHYDRAMINE HYDROCHLORIDE PRN MG: 25 CAPSULE ORAL at 21:15

## 2019-05-03 RX ADMIN — VANCOMYCIN HYDROCHLORIDE SCH MLS/HR: 1 INJECTION, POWDER, LYOPHILIZED, FOR SOLUTION INTRAVENOUS at 18:59

## 2019-05-03 RX ADMIN — HYDROMORPHONE HYDROCHLORIDE PRN MG: 2 INJECTION INTRAMUSCULAR; INTRAVENOUS; SUBCUTANEOUS at 12:06

## 2019-05-03 RX ADMIN — DEXTROSE, SODIUM CHLORIDE, AND POTASSIUM CHLORIDE SCH MLS/HR: 5; .45; .15 INJECTION INTRAVENOUS at 21:30

## 2019-05-03 RX ADMIN — DEXTROSE, SODIUM CHLORIDE, AND POTASSIUM CHLORIDE SCH MLS/HR: 5; .45; .15 INJECTION INTRAVENOUS at 21:19

## 2019-05-03 RX ADMIN — FLUCONAZOLE, SODIUM CHLORIDE SCH MLS/HR: 2 INJECTION INTRAVENOUS at 15:19

## 2019-05-03 RX ADMIN — PANTOPRAZOLE SODIUM SCH MG: 40 TABLET, DELAYED RELEASE ORAL at 06:44

## 2019-05-03 RX ADMIN — MORPHINE SULFATE PRN MG: 10 INJECTION INTRAVENOUS at 04:06

## 2019-05-03 RX ADMIN — MEROPENEM SCH MLS/HR: 1 INJECTION, POWDER, FOR SOLUTION INTRAVENOUS at 21:18

## 2019-05-03 RX ADMIN — MEROPENEM SCH MLS/HR: 1 INJECTION, POWDER, FOR SOLUTION INTRAVENOUS at 04:00

## 2019-05-03 RX ADMIN — DEXTROSE, SODIUM CHLORIDE, AND POTASSIUM CHLORIDE SCH MLS/HR: 5; .45; .15 INJECTION INTRAVENOUS at 03:52

## 2019-05-03 RX ADMIN — MEROPENEM SCH MLS/HR: 1 INJECTION, POWDER, FOR SOLUTION INTRAVENOUS at 11:47

## 2019-05-03 RX ADMIN — ACETAMINOPHEN PRN MG: 325 TABLET, FILM COATED ORAL at 20:00

## 2019-05-03 RX ADMIN — ACETAMINOPHEN PRN MG: 325 TABLET, FILM COATED ORAL at 04:12

## 2019-05-04 VITALS — SYSTOLIC BLOOD PRESSURE: 138 MMHG | DIASTOLIC BLOOD PRESSURE: 71 MMHG

## 2019-05-04 VITALS — DIASTOLIC BLOOD PRESSURE: 78 MMHG | SYSTOLIC BLOOD PRESSURE: 160 MMHG

## 2019-05-04 VITALS — DIASTOLIC BLOOD PRESSURE: 75 MMHG | SYSTOLIC BLOOD PRESSURE: 123 MMHG

## 2019-05-04 VITALS — SYSTOLIC BLOOD PRESSURE: 120 MMHG | DIASTOLIC BLOOD PRESSURE: 70 MMHG

## 2019-05-04 VITALS — DIASTOLIC BLOOD PRESSURE: 68 MMHG | SYSTOLIC BLOOD PRESSURE: 116 MMHG

## 2019-05-04 VITALS — SYSTOLIC BLOOD PRESSURE: 169 MMHG | DIASTOLIC BLOOD PRESSURE: 102 MMHG

## 2019-05-04 VITALS — DIASTOLIC BLOOD PRESSURE: 73 MMHG | SYSTOLIC BLOOD PRESSURE: 149 MMHG

## 2019-05-04 LAB
<PLATELET ESTIMATE>: (no result)
<PLT MORPHOLOGY>: (no result)
ALBUMIN SERPL-MCNC: 1.9 G/DL (ref 3.4–5)
ALP SERPL-CCNC: 145 U/L (ref 45–117)
ALT SERPL-CCNC: 67 U/L (ref 12–78)
ANION GAP SERPL CALC-SCNC: 7 MMOL/L (ref 5–15)
ANISOCYTOSIS BLD QL SMEAR: (no result)
BAND#(MANUAL): 0.01 X10^3/UL
BILIRUB SERPL-MCNC: 0.8 MG/DL (ref 0.2–1)
CALCIUM SERPL-MCNC: 7.9 MG/DL (ref 8.5–10.1)
CHLORIDE SERPL-SCNC: 107 MMOL/L (ref 98–107)
CK SERPL-CCNC: 15 U/L (ref 26–192)
CREAT SERPL-MCNC: 0.56 MG/DL (ref 0.55–1.02)
CRP SERPL-MCNC: 17 MG/DL (ref 0.02–0.49)
EOS#(MANUAL): 0.01 X10^3/UL (ref 0–0.4)
EOS% (MANUAL): 2 % (ref 1–7)
ERYTHROCYTE [DISTWIDTH] IN BLOOD BY AUTOMATED COUNT: 16.9 % (ref 9.6–15.2)
ERYTHROCYTE [SEDIMENTATION RATE] IN BLOOD BY WESTERGREN METHOD: > 120 MM/HR (ref 0–20)
HCT (SEDRATE): 20.6 % (ref 34.6–47.8)
LYMPH#(MANUAL): 0.3 X10^3/UL (ref 1–3.4)
LYMPHS% (MANUAL): 74 % (ref 22–44)
MACROCYTES BLD QL SMEAR: (no result)
MCH RBC QN AUTO: 32.8 PG (ref 27–34.8)
MCHC RBC AUTO-ENTMCNC: 35.3 G/DL (ref 32.4–35.8)
MCV RBC AUTO: 93.1 FL (ref 80–100)
MD: YES
NEUTS BAND NFR BLD: 2 % (ref 0–7)
OTHER CELLS # (MANUAL): 0.02 X10^3/UL (ref 0–0)
OTHER CELLS % (MANUAL): 4 % (ref 0–0)
OVALOCYTES BLD QL SMEAR: (no result)
PLATELET # BLD AUTO: 13 X10^3/UL (ref 130–400)
PMV BLD AUTO: 9.5 FL (ref 7.4–10.4)
PROT SERPL-MCNC: 5.2 G/DL (ref 6.4–8.2)
RBC # BLD AUTO: 2.38 X10^6/UL (ref 3.82–5.3)
SEG#(MANUAL): 0.07 X10^3/UL (ref 1.8–6.8)
SEGS% (MANUAL): 18 % (ref 42–75)

## 2019-05-04 PROCEDURE — B5181ZA FLUOROSCOPY OF SUPERIOR VENA CAVA USING LOW OSMOLAR CONTRAST, GUIDANCE: ICD-10-PCS | Performed by: RADIOLOGY

## 2019-05-04 PROCEDURE — 02HV33Z INSERTION OF INFUSION DEVICE INTO SUPERIOR VENA CAVA, PERCUTANEOUS APPROACH: ICD-10-PCS | Performed by: RADIOLOGY

## 2019-05-04 PROCEDURE — B548ZZA ULTRASONOGRAPHY OF SUPERIOR VENA CAVA, GUIDANCE: ICD-10-PCS | Performed by: RADIOLOGY

## 2019-05-04 RX ADMIN — ACETAMINOPHEN PRN MG: 325 TABLET, FILM COATED ORAL at 10:50

## 2019-05-04 RX ADMIN — ACETAMINOPHEN PRN MG: 325 TABLET, FILM COATED ORAL at 16:13

## 2019-05-04 RX ADMIN — MEROPENEM SCH MLS/HR: 1 INJECTION, POWDER, FOR SOLUTION INTRAVENOUS at 18:54

## 2019-05-04 RX ADMIN — MORPHINE SULFATE PRN MG: 10 INJECTION INTRAVENOUS at 14:05

## 2019-05-04 RX ADMIN — DEXTROSE, SODIUM CHLORIDE, AND POTASSIUM CHLORIDE SCH MLS/HR: 5; .45; .15 INJECTION INTRAVENOUS at 05:30

## 2019-05-04 RX ADMIN — MORPHINE SULFATE PRN MG: 10 INJECTION INTRAVENOUS at 10:47

## 2019-05-04 RX ADMIN — MEROPENEM SCH MLS/HR: 1 INJECTION, POWDER, FOR SOLUTION INTRAVENOUS at 10:48

## 2019-05-04 RX ADMIN — DIPHENHYDRAMINE HYDROCHLORIDE PRN MG: 25 CAPSULE ORAL at 22:40

## 2019-05-04 RX ADMIN — DIPHENHYDRAMINE HYDROCHLORIDE PRN MG: 25 CAPSULE ORAL at 16:14

## 2019-05-04 RX ADMIN — PANTOPRAZOLE SODIUM SCH MG: 40 TABLET, DELAYED RELEASE ORAL at 06:19

## 2019-05-04 RX ADMIN — MORPHINE SULFATE PRN MG: 10 INJECTION INTRAVENOUS at 20:24

## 2019-05-04 RX ADMIN — DEXTROSE, SODIUM CHLORIDE, AND POTASSIUM CHLORIDE SCH MLS/HR: 5; .45; .15 INJECTION INTRAVENOUS at 20:57

## 2019-05-04 RX ADMIN — MORPHINE SULFATE PRN MG: 10 INJECTION INTRAVENOUS at 02:36

## 2019-05-04 RX ADMIN — ACETAMINOPHEN PRN MG: 325 TABLET, FILM COATED ORAL at 02:41

## 2019-05-04 RX ADMIN — DEXTROSE, SODIUM CHLORIDE, AND POTASSIUM CHLORIDE SCH MLS/HR: 5; .45; .15 INJECTION INTRAVENOUS at 09:50

## 2019-05-04 RX ADMIN — MEROPENEM SCH MLS/HR: 1 INJECTION, POWDER, FOR SOLUTION INTRAVENOUS at 05:00

## 2019-05-04 RX ADMIN — MENTHOL, METHYL SALICYLATE SCH APPLIC: 10; 15 CREAM TOPICAL at 20:57

## 2019-05-04 RX ADMIN — MORPHINE SULFATE PRN MG: 10 INJECTION INTRAVENOUS at 21:10

## 2019-05-04 RX ADMIN — MENTHOL, METHYL SALICYLATE SCH APPLIC: 10; 15 CREAM TOPICAL at 16:13

## 2019-05-04 RX ADMIN — MICAFUNGIN SODIUM SCH MLS/HR: 20 INJECTION, POWDER, LYOPHILIZED, FOR SOLUTION INTRAVENOUS at 13:58

## 2019-05-04 RX ADMIN — DAPTOMYCIN SCH MLS/HR: 500 INJECTION, POWDER, LYOPHILIZED, FOR SOLUTION INTRAVENOUS at 16:13

## 2019-05-04 RX ADMIN — ACETAMINOPHEN PRN MG: 325 TABLET, FILM COATED ORAL at 21:33

## 2019-05-04 RX ADMIN — POLYETHYLENE GLYCOL 3350 PRN GM: 17 POWDER, FOR SOLUTION ORAL at 10:49

## 2019-05-04 RX ADMIN — VANCOMYCIN HYDROCHLORIDE SCH MLS/HR: 1 INJECTION, POWDER, LYOPHILIZED, FOR SOLUTION INTRAVENOUS at 06:19

## 2019-05-04 RX ADMIN — MENTHOL, METHYL SALICYLATE SCH APPLIC: 10; 15 CREAM TOPICAL at 09:50

## 2019-05-04 RX ADMIN — HYDROMORPHONE HYDROCHLORIDE PRN MG: 2 INJECTION INTRAMUSCULAR; INTRAVENOUS; SUBCUTANEOUS at 16:13

## 2019-05-05 VITALS — SYSTOLIC BLOOD PRESSURE: 147 MMHG | DIASTOLIC BLOOD PRESSURE: 57 MMHG

## 2019-05-05 VITALS — DIASTOLIC BLOOD PRESSURE: 77 MMHG | SYSTOLIC BLOOD PRESSURE: 163 MMHG

## 2019-05-05 VITALS — SYSTOLIC BLOOD PRESSURE: 133 MMHG | DIASTOLIC BLOOD PRESSURE: 84 MMHG

## 2019-05-05 VITALS — DIASTOLIC BLOOD PRESSURE: 54 MMHG | SYSTOLIC BLOOD PRESSURE: 130 MMHG

## 2019-05-05 VITALS — DIASTOLIC BLOOD PRESSURE: 79 MMHG | SYSTOLIC BLOOD PRESSURE: 148 MMHG

## 2019-05-05 VITALS — DIASTOLIC BLOOD PRESSURE: 71 MMHG | SYSTOLIC BLOOD PRESSURE: 139 MMHG

## 2019-05-05 LAB
<PLATELET ESTIMATE>: (no result)
<PLT MORPHOLOGY>: (no result)
ALBUMIN SERPL-MCNC: 2.1 G/DL (ref 3.4–5)
ALP SERPL-CCNC: 141 U/L (ref 45–117)
ALT SERPL-CCNC: 62 U/L (ref 12–78)
ANION GAP SERPL CALC-SCNC: 6 MMOL/L (ref 5–15)
ANISOCYTOSIS BLD QL SMEAR: (no result)
BAND#(MANUAL): 0.01 X10^3/UL
BILIRUB SERPL-MCNC: 0.6 MG/DL (ref 0.2–1)
CALCIUM SERPL-MCNC: 8.2 MG/DL (ref 8.5–10.1)
CHLORIDE SERPL-SCNC: 103 MMOL/L (ref 98–107)
CREAT SERPL-MCNC: 0.52 MG/DL (ref 0.55–1.02)
EOS#(MANUAL): 0.01 X10^3/UL (ref 0–0.4)
EOS% (MANUAL): 2 % (ref 1–7)
ERYTHROCYTE [DISTWIDTH] IN BLOOD BY AUTOMATED COUNT: 16.3 % (ref 9.6–15.2)
LYMPH#(MANUAL): 0.34 X10^3/UL (ref 1–3.4)
LYMPHS% (MANUAL): 86 % (ref 22–44)
MACROCYTES BLD QL SMEAR: (no result)
MCH RBC QN AUTO: 32.4 PG (ref 27–34.8)
MCHC RBC AUTO-ENTMCNC: 34.5 G/DL (ref 32.4–35.8)
MCV RBC AUTO: 94 FL (ref 80–100)
MD: YES
NEUTS BAND NFR BLD: 2 % (ref 0–7)
OTHER CELLS # (MANUAL): 0.01 X10^3/UL (ref 0–0)
OTHER CELLS % (MANUAL): 2 % (ref 0–0)
OVALOCYTES BLD QL SMEAR: (no result)
PLATELET # BLD AUTO: 14 X10^3/UL (ref 130–400)
PMV BLD AUTO: 9.7 FL (ref 7.4–10.4)
PROT SERPL-MCNC: 5.4 G/DL (ref 6.4–8.2)
RBC # BLD AUTO: 2.65 X10^6/UL (ref 3.82–5.3)
REACTIVE LYMPHS # (MANUAL): 0.01 X10^3/UL (ref 0–0)
REACTIVE LYMPHS % (MANUAL): 2 % (ref 0–0)
SEG#(MANUAL): 0.02 X10^3/UL (ref 1.8–6.8)
SEGS% (MANUAL): 6 % (ref 42–75)

## 2019-05-05 RX ADMIN — ACETAMINOPHEN PRN MG: 325 TABLET, FILM COATED ORAL at 13:59

## 2019-05-05 RX ADMIN — ACETAMINOPHEN PRN MG: 325 TABLET, FILM COATED ORAL at 05:04

## 2019-05-05 RX ADMIN — POTASSIUM CHLORIDE SCH MEQ: 20 TABLET, EXTENDED RELEASE ORAL at 21:39

## 2019-05-05 RX ADMIN — MORPHINE SULFATE PRN MG: 10 INJECTION INTRAVENOUS at 15:15

## 2019-05-05 RX ADMIN — MENTHOL, METHYL SALICYLATE SCH APPLIC: 10; 15 CREAM TOPICAL at 16:27

## 2019-05-05 RX ADMIN — DAPTOMYCIN SCH MLS/HR: 500 INJECTION, POWDER, LYOPHILIZED, FOR SOLUTION INTRAVENOUS at 16:27

## 2019-05-05 RX ADMIN — ONDANSETRON PRN MG: 2 INJECTION, SOLUTION INTRAMUSCULAR; INTRAVENOUS at 20:12

## 2019-05-05 RX ADMIN — DEXTROSE, SODIUM CHLORIDE, AND POTASSIUM CHLORIDE SCH MLS/HR: 5; .45; .15 INJECTION INTRAVENOUS at 05:05

## 2019-05-05 RX ADMIN — DEXTROSE, SODIUM CHLORIDE, AND POTASSIUM CHLORIDE SCH MLS/HR: 5; .45; .15 INJECTION INTRAVENOUS at 15:15

## 2019-05-05 RX ADMIN — DIPHENHYDRAMINE HYDROCHLORIDE PRN MG: 25 CAPSULE ORAL at 21:40

## 2019-05-05 RX ADMIN — MEROPENEM SCH MLS/HR: 1 INJECTION, POWDER, FOR SOLUTION INTRAVENOUS at 03:41

## 2019-05-05 RX ADMIN — MORPHINE SULFATE PRN MG: 10 INJECTION INTRAVENOUS at 09:07

## 2019-05-05 RX ADMIN — MEROPENEM SCH MLS/HR: 1 INJECTION, POWDER, FOR SOLUTION INTRAVENOUS at 19:46

## 2019-05-05 RX ADMIN — MORPHINE SULFATE PRN MG: 10 INJECTION INTRAVENOUS at 02:15

## 2019-05-05 RX ADMIN — MENTHOL, METHYL SALICYLATE SCH APPLIC: 10; 15 CREAM TOPICAL at 09:00

## 2019-05-05 RX ADMIN — MORPHINE SULFATE PRN MG: 10 INJECTION INTRAVENOUS at 19:46

## 2019-05-05 RX ADMIN — DEXTROSE, SODIUM CHLORIDE, AND POTASSIUM CHLORIDE SCH MLS/HR: 5; .45; .15 INJECTION INTRAVENOUS at 23:09

## 2019-05-05 RX ADMIN — MICAFUNGIN SODIUM SCH MLS/HR: 20 INJECTION, POWDER, LYOPHILIZED, FOR SOLUTION INTRAVENOUS at 13:59

## 2019-05-05 RX ADMIN — POTASSIUM CHLORIDE SCH MEQ: 20 TABLET, EXTENDED RELEASE ORAL at 17:33

## 2019-05-05 RX ADMIN — DIPHENHYDRAMINE HYDROCHLORIDE PRN MG: 25 CAPSULE ORAL at 05:04

## 2019-05-05 RX ADMIN — PANTOPRAZOLE SODIUM SCH MG: 40 TABLET, DELAYED RELEASE ORAL at 05:04

## 2019-05-05 RX ADMIN — MEROPENEM SCH MLS/HR: 1 INJECTION, POWDER, FOR SOLUTION INTRAVENOUS at 11:05

## 2019-05-05 RX ADMIN — MENTHOL, METHYL SALICYLATE SCH APPLIC: 10; 15 CREAM TOPICAL at 20:16

## 2019-05-05 RX ADMIN — ACETAMINOPHEN PRN MG: 325 TABLET, FILM COATED ORAL at 21:40

## 2019-05-05 RX ADMIN — HYDROMORPHONE HYDROCHLORIDE PRN MG: 2 INJECTION INTRAMUSCULAR; INTRAVENOUS; SUBCUTANEOUS at 21:57

## 2019-05-06 VITALS — DIASTOLIC BLOOD PRESSURE: 73 MMHG | SYSTOLIC BLOOD PRESSURE: 123 MMHG

## 2019-05-06 VITALS — SYSTOLIC BLOOD PRESSURE: 130 MMHG | DIASTOLIC BLOOD PRESSURE: 74 MMHG

## 2019-05-06 VITALS — SYSTOLIC BLOOD PRESSURE: 101 MMHG | DIASTOLIC BLOOD PRESSURE: 67 MMHG

## 2019-05-06 VITALS — SYSTOLIC BLOOD PRESSURE: 120 MMHG | DIASTOLIC BLOOD PRESSURE: 83 MMHG

## 2019-05-06 VITALS — SYSTOLIC BLOOD PRESSURE: 127 MMHG | DIASTOLIC BLOOD PRESSURE: 84 MMHG

## 2019-05-06 LAB
<PLATELET ESTIMATE>: (no result)
<PLT MORPHOLOGY>: (no result)
ALBUMIN SERPL-MCNC: 2 G/DL (ref 3.4–5)
ALP SERPL-CCNC: 123 U/L (ref 45–117)
ALT SERPL-CCNC: 63 U/L (ref 12–78)
ANION GAP SERPL CALC-SCNC: 4 MMOL/L (ref 5–15)
ANISOCYTOSIS BLD QL SMEAR: (no result)
BAND#(MANUAL): 0.01 X10^3/UL
BILIRUB SERPL-MCNC: 0.3 MG/DL (ref 0.2–1)
CALCIUM SERPL-MCNC: 8.3 MG/DL (ref 8.5–10.1)
CHLORIDE SERPL-SCNC: 106 MMOL/L (ref 98–107)
CK SERPL-CCNC: 15 U/L (ref 26–192)
CREAT SERPL-MCNC: 0.46 MG/DL (ref 0.55–1.02)
CRP SERPL-MCNC: 16 MG/DL (ref 0.02–0.49)
ERYTHROCYTE [DISTWIDTH] IN BLOOD BY AUTOMATED COUNT: 16.2 % (ref 9.6–15.2)
ERYTHROCYTE [SEDIMENTATION RATE] IN BLOOD BY WESTERGREN METHOD: 103 MM/HR (ref 0–20)
HCT (SEDRATE): 25.3 % (ref 34.6–47.8)
LYMPH#(MANUAL): 0.32 X10^3/UL (ref 1–3.4)
LYMPHS% (MANUAL): 81 % (ref 22–44)
MACROCYTES BLD QL SMEAR: (no result)
MCH RBC QN AUTO: 32 PG (ref 27–34.8)
MCHC RBC AUTO-ENTMCNC: 34.2 G/DL (ref 32.4–35.8)
MCV RBC AUTO: 93.5 FL (ref 80–100)
MD: YES
MONOS#(MANUAL): 0.02 X10^3/UL (ref 0.3–2.7)
MONOS% (MANUAL): 4 % (ref 2–9)
NEUTS BAND NFR BLD: 2 % (ref 0–7)
OVALOCYTES BLD QL SMEAR: (no result)
PLATELET # BLD AUTO: 18 X10^3/UL (ref 130–400)
PMV BLD AUTO: 9.5 FL (ref 7.4–10.4)
PROT SERPL-MCNC: 5.5 G/DL (ref 6.4–8.2)
RBC # BLD AUTO: 2.7 X10^6/UL (ref 3.82–5.3)
SEG#(MANUAL): 0.05 X10^3/UL (ref 1.8–6.8)
SEGS% (MANUAL): 13 % (ref 42–75)

## 2019-05-06 RX ADMIN — MORPHINE SULFATE PRN MG: 10 INJECTION INTRAVENOUS at 02:42

## 2019-05-06 RX ADMIN — DEXTROSE, SODIUM CHLORIDE, AND POTASSIUM CHLORIDE SCH MLS/HR: 5; .45; .15 INJECTION INTRAVENOUS at 08:09

## 2019-05-06 RX ADMIN — MEROPENEM SCH MLS/HR: 1 INJECTION, POWDER, FOR SOLUTION INTRAVENOUS at 19:38

## 2019-05-06 RX ADMIN — DIPHENHYDRAMINE HYDROCHLORIDE PRN MG: 25 CAPSULE ORAL at 22:33

## 2019-05-06 RX ADMIN — OXYCODONE HYDROCHLORIDE AND ACETAMINOPHEN PRN TAB: 7.5; 325 TABLET ORAL at 20:02

## 2019-05-06 RX ADMIN — ACETAMINOPHEN PRN MG: 325 TABLET, FILM COATED ORAL at 10:10

## 2019-05-06 RX ADMIN — MENTHOL, METHYL SALICYLATE SCH APPLIC: 10; 15 CREAM TOPICAL at 08:09

## 2019-05-06 RX ADMIN — MEROPENEM SCH MLS/HR: 1 INJECTION, POWDER, FOR SOLUTION INTRAVENOUS at 03:39

## 2019-05-06 RX ADMIN — POLYETHYLENE GLYCOL 3350 PRN GM: 17 POWDER, FOR SOLUTION ORAL at 06:01

## 2019-05-06 RX ADMIN — DAPTOMYCIN SCH MLS/HR: 500 INJECTION, POWDER, LYOPHILIZED, FOR SOLUTION INTRAVENOUS at 17:10

## 2019-05-06 RX ADMIN — MICAFUNGIN SODIUM SCH MLS/HR: 20 INJECTION, POWDER, LYOPHILIZED, FOR SOLUTION INTRAVENOUS at 17:44

## 2019-05-06 RX ADMIN — MEROPENEM SCH MLS/HR: 1 INJECTION, POWDER, FOR SOLUTION INTRAVENOUS at 11:37

## 2019-05-06 RX ADMIN — HYDROMORPHONE HYDROCHLORIDE PRN MG: 2 INJECTION INTRAMUSCULAR; INTRAVENOUS; SUBCUTANEOUS at 19:38

## 2019-05-06 RX ADMIN — DEXTROSE, SODIUM CHLORIDE, AND POTASSIUM CHLORIDE SCH MLS/HR: 5; .45; .15 INJECTION INTRAVENOUS at 16:50

## 2019-05-06 RX ADMIN — MENTHOL, METHYL SALICYLATE SCH APPLIC: 10; 15 CREAM TOPICAL at 20:02

## 2019-05-06 RX ADMIN — HYDROMORPHONE HYDROCHLORIDE PRN MG: 2 INJECTION INTRAMUSCULAR; INTRAVENOUS; SUBCUTANEOUS at 03:49

## 2019-05-06 RX ADMIN — MENTHOL, METHYL SALICYLATE SCH APPLIC: 10; 15 CREAM TOPICAL at 17:10

## 2019-05-06 RX ADMIN — MORPHINE SULFATE PRN MG: 10 INJECTION INTRAVENOUS at 08:25

## 2019-05-06 RX ADMIN — MICAFUNGIN SODIUM SCH MLS/HR: 20 INJECTION, POWDER, LYOPHILIZED, FOR SOLUTION INTRAVENOUS at 14:00

## 2019-05-06 RX ADMIN — ONDANSETRON PRN MG: 2 INJECTION, SOLUTION INTRAMUSCULAR; INTRAVENOUS at 19:38

## 2019-05-06 RX ADMIN — PANTOPRAZOLE SODIUM SCH MG: 40 TABLET, DELAYED RELEASE ORAL at 05:45

## 2019-05-07 VITALS — SYSTOLIC BLOOD PRESSURE: 112 MMHG | DIASTOLIC BLOOD PRESSURE: 66 MMHG

## 2019-05-07 VITALS — DIASTOLIC BLOOD PRESSURE: 55 MMHG | SYSTOLIC BLOOD PRESSURE: 120 MMHG

## 2019-05-07 VITALS — SYSTOLIC BLOOD PRESSURE: 132 MMHG | DIASTOLIC BLOOD PRESSURE: 83 MMHG

## 2019-05-07 VITALS — SYSTOLIC BLOOD PRESSURE: 133 MMHG | DIASTOLIC BLOOD PRESSURE: 79 MMHG

## 2019-05-07 LAB
ALBUMIN SERPL-MCNC: 2 G/DL (ref 3.4–5)
ALP SERPL-CCNC: 111 U/L (ref 45–117)
ALT SERPL-CCNC: 56 U/L (ref 12–78)
ANION GAP SERPL CALC-SCNC: 6 MMOL/L (ref 5–15)
BILIRUB SERPL-MCNC: 0.5 MG/DL (ref 0.2–1)
CALCIUM SERPL-MCNC: 8.4 MG/DL (ref 8.5–10.1)
CHLORIDE SERPL-SCNC: 104 MMOL/L (ref 98–107)
CREAT SERPL-MCNC: 0.45 MG/DL (ref 0.55–1.02)
ERYTHROCYTE [DISTWIDTH] IN BLOOD BY AUTOMATED COUNT: 16.5 % (ref 9.6–15.2)
MCH RBC QN AUTO: 32.5 PG (ref 27–34.8)
MCHC RBC AUTO-ENTMCNC: 34.5 G/DL (ref 32.4–35.8)
MCV RBC AUTO: 94.2 FL (ref 80–100)
PLATELET # BLD AUTO: 13 X10^3/UL (ref 130–400)
PMV BLD AUTO: 9 FL (ref 7.4–10.4)
PROT SERPL-MCNC: 5.3 G/DL (ref 6.4–8.2)
RBC # BLD AUTO: 2.53 X10^6/UL (ref 3.82–5.3)

## 2019-05-07 RX ADMIN — MENTHOL, METHYL SALICYLATE SCH APPLIC: 10; 15 CREAM TOPICAL at 08:35

## 2019-05-07 RX ADMIN — MENTHOL, METHYL SALICYLATE SCH APPLIC: 10; 15 CREAM TOPICAL at 21:32

## 2019-05-07 RX ADMIN — MEROPENEM SCH MLS/HR: 1 INJECTION, POWDER, FOR SOLUTION INTRAVENOUS at 03:46

## 2019-05-07 RX ADMIN — DAPTOMYCIN SCH MLS/HR: 500 INJECTION, POWDER, LYOPHILIZED, FOR SOLUTION INTRAVENOUS at 16:18

## 2019-05-07 RX ADMIN — DEXTROSE, SODIUM CHLORIDE, AND POTASSIUM CHLORIDE SCH MLS/HR: 5; .45; .15 INJECTION INTRAVENOUS at 12:14

## 2019-05-07 RX ADMIN — PANTOPRAZOLE SODIUM SCH MG: 40 TABLET, DELAYED RELEASE ORAL at 06:27

## 2019-05-07 RX ADMIN — MICAFUNGIN SODIUM SCH MLS/HR: 20 INJECTION, POWDER, LYOPHILIZED, FOR SOLUTION INTRAVENOUS at 17:38

## 2019-05-07 RX ADMIN — POLYETHYLENE GLYCOL 3350 PRN GM: 17 POWDER, FOR SOLUTION ORAL at 13:06

## 2019-05-07 RX ADMIN — DEXTROSE, SODIUM CHLORIDE, AND POTASSIUM CHLORIDE SCH MLS/HR: 5; .45; .15 INJECTION INTRAVENOUS at 01:04

## 2019-05-07 RX ADMIN — OXYCODONE HYDROCHLORIDE AND ACETAMINOPHEN PRN TAB: 7.5; 325 TABLET ORAL at 17:37

## 2019-05-07 RX ADMIN — DEXTROSE, SODIUM CHLORIDE, AND POTASSIUM CHLORIDE SCH MLS/HR: 5; .45; .15 INJECTION INTRAVENOUS at 21:32

## 2019-05-07 RX ADMIN — MORPHINE SULFATE PRN MG: 10 INJECTION INTRAVENOUS at 01:15

## 2019-05-07 RX ADMIN — OXYCODONE HYDROCHLORIDE AND ACETAMINOPHEN PRN TAB: 7.5; 325 TABLET ORAL at 22:52

## 2019-05-07 RX ADMIN — MEROPENEM SCH MLS/HR: 1 INJECTION, POWDER, FOR SOLUTION INTRAVENOUS at 12:14

## 2019-05-07 RX ADMIN — ONDANSETRON PRN MG: 2 INJECTION, SOLUTION INTRAMUSCULAR; INTRAVENOUS at 17:37

## 2019-05-07 RX ADMIN — ACETAMINOPHEN PRN MG: 325 TABLET, FILM COATED ORAL at 06:26

## 2019-05-07 RX ADMIN — MEROPENEM SCH MLS/HR: 1 INJECTION, POWDER, FOR SOLUTION INTRAVENOUS at 19:57

## 2019-05-07 RX ADMIN — MENTHOL, METHYL SALICYLATE SCH APPLIC: 10; 15 CREAM TOPICAL at 16:18

## 2019-05-07 RX ADMIN — DIPHENHYDRAMINE HYDROCHLORIDE PRN MG: 25 CAPSULE ORAL at 21:36

## 2019-05-08 VITALS — DIASTOLIC BLOOD PRESSURE: 63 MMHG | SYSTOLIC BLOOD PRESSURE: 119 MMHG

## 2019-05-08 VITALS — SYSTOLIC BLOOD PRESSURE: 116 MMHG | DIASTOLIC BLOOD PRESSURE: 76 MMHG

## 2019-05-08 VITALS — SYSTOLIC BLOOD PRESSURE: 113 MMHG | DIASTOLIC BLOOD PRESSURE: 73 MMHG

## 2019-05-08 VITALS — SYSTOLIC BLOOD PRESSURE: 133 MMHG | DIASTOLIC BLOOD PRESSURE: 71 MMHG

## 2019-05-08 LAB
<PLATELET ESTIMATE>: (no result)
<PLT MORPHOLOGY>: (no result)
ALBUMIN SERPL-MCNC: 2.1 G/DL (ref 3.4–5)
ALP SERPL-CCNC: 108 U/L (ref 45–117)
ALT SERPL-CCNC: 49 U/L (ref 12–78)
ANION GAP SERPL CALC-SCNC: 3 MMOL/L (ref 5–15)
ANISOCYTOSIS BLD QL SMEAR: (no result)
BILIRUB SERPL-MCNC: 0.5 MG/DL (ref 0.2–1)
BLASTS # (MANUAL): 0 X10^3/UL (ref 0–0)
BLASTS NFR BLD MANUAL: 1 % (ref 0–0)
CALCIUM SERPL-MCNC: 8.7 MG/DL (ref 8.5–10.1)
CHLORIDE SERPL-SCNC: 110 MMOL/L (ref 98–107)
CREAT SERPL-MCNC: 0.41 MG/DL (ref 0.55–1.02)
ERYTHROCYTE [DISTWIDTH] IN BLOOD BY AUTOMATED COUNT: 15.4 % (ref 9.6–15.2)
LYMPH#(MANUAL): 0.34 X10^3/UL (ref 1–3.4)
LYMPHS% (MANUAL): 86 % (ref 22–44)
MCH RBC QN AUTO: 32.9 PG (ref 27–34.8)
MCHC RBC AUTO-ENTMCNC: 35.2 G/DL (ref 32.4–35.8)
MCV RBC AUTO: 93.4 FL (ref 80–100)
MD: YES
MONOS#(MANUAL): 0.01 X10^3/UL (ref 0.3–2.7)
MONOS% (MANUAL): 2 % (ref 2–9)
PLATELET # BLD AUTO: 11 X10^3/UL (ref 130–400)
PMV BLD AUTO: 10.2 FL (ref 7.4–10.4)
PROT SERPL-MCNC: 5.4 G/DL (ref 6.4–8.2)
RBC # BLD AUTO: 2.47 X10^6/UL (ref 3.82–5.3)
SEG#(MANUAL): 0.04 X10^3/UL (ref 1.8–6.8)
SEGS% (MANUAL): 11 % (ref 42–75)

## 2019-05-08 RX ADMIN — MEROPENEM SCH MLS/HR: 1 INJECTION, POWDER, FOR SOLUTION INTRAVENOUS at 03:37

## 2019-05-08 RX ADMIN — MEROPENEM SCH MLS/HR: 1 INJECTION, POWDER, FOR SOLUTION INTRAVENOUS at 11:38

## 2019-05-08 RX ADMIN — CYTARABINE SCH MLS/HR: 100 INJECTION, SOLUTION INTRATHECAL; INTRAVENOUS; SUBCUTANEOUS at 22:05

## 2019-05-08 RX ADMIN — OXYCODONE HYDROCHLORIDE AND ACETAMINOPHEN PRN TAB: 7.5; 325 TABLET ORAL at 15:44

## 2019-05-08 RX ADMIN — DIPHENHYDRAMINE HYDROCHLORIDE PRN MG: 25 CAPSULE ORAL at 22:30

## 2019-05-08 RX ADMIN — OXYCODONE HYDROCHLORIDE AND ACETAMINOPHEN PRN TAB: 7.5; 325 TABLET ORAL at 22:30

## 2019-05-08 RX ADMIN — MICAFUNGIN SODIUM SCH MLS/HR: 20 INJECTION, POWDER, LYOPHILIZED, FOR SOLUTION INTRAVENOUS at 16:54

## 2019-05-08 RX ADMIN — DEXTROSE, SODIUM CHLORIDE, AND POTASSIUM CHLORIDE SCH MLS/HR: 5; .45; .15 INJECTION INTRAVENOUS at 15:21

## 2019-05-08 RX ADMIN — DEXTROSE, SODIUM CHLORIDE, AND POTASSIUM CHLORIDE SCH MLS/HR: 5; .45; .15 INJECTION INTRAVENOUS at 05:34

## 2019-05-08 RX ADMIN — ALLOPURINOL SCH MG: 300 TABLET ORAL at 18:03

## 2019-05-08 RX ADMIN — ONDANSETRON HYDROCHLORIDE SCH MLS/HR: 2 INJECTION, SOLUTION INTRAMUSCULAR; INTRAVENOUS at 20:25

## 2019-05-08 RX ADMIN — MEROPENEM SCH MLS/HR: 1 INJECTION, POWDER, FOR SOLUTION INTRAVENOUS at 19:27

## 2019-05-08 RX ADMIN — IDARUBICIN HYDROCHLORIDE SCH MLS/HR: 1 SOLUTION INTRAVENOUS at 21:34

## 2019-05-08 RX ADMIN — MENTHOL, METHYL SALICYLATE SCH APPLIC: 10; 15 CREAM TOPICAL at 21:34

## 2019-05-08 RX ADMIN — MENTHOL, METHYL SALICYLATE SCH APPLIC: 10; 15 CREAM TOPICAL at 09:49

## 2019-05-08 RX ADMIN — DEXTROSE, SODIUM CHLORIDE, AND POTASSIUM CHLORIDE SCH MLS/HR: 5; .45; .15 INJECTION INTRAVENOUS at 12:00

## 2019-05-08 RX ADMIN — PANTOPRAZOLE SODIUM SCH MG: 40 TABLET, DELAYED RELEASE ORAL at 05:34

## 2019-05-08 RX ADMIN — MENTHOL, METHYL SALICYLATE SCH APPLIC: 10; 15 CREAM TOPICAL at 16:00

## 2019-05-09 VITALS — DIASTOLIC BLOOD PRESSURE: 84 MMHG | SYSTOLIC BLOOD PRESSURE: 149 MMHG

## 2019-05-09 VITALS — DIASTOLIC BLOOD PRESSURE: 80 MMHG | SYSTOLIC BLOOD PRESSURE: 135 MMHG

## 2019-05-09 VITALS — DIASTOLIC BLOOD PRESSURE: 66 MMHG | SYSTOLIC BLOOD PRESSURE: 101 MMHG

## 2019-05-09 VITALS — SYSTOLIC BLOOD PRESSURE: 150 MMHG | DIASTOLIC BLOOD PRESSURE: 90 MMHG

## 2019-05-09 LAB
<PLATELET ESTIMATE>: (no result)
<PLT MORPHOLOGY>: (no result)
ALBUMIN SERPL-MCNC: 2.3 G/DL (ref 3.4–5)
ALP SERPL-CCNC: 110 U/L (ref 45–117)
ALT SERPL-CCNC: 46 U/L (ref 12–78)
ANION GAP SERPL CALC-SCNC: 7 MMOL/L (ref 5–15)
ANISOCYTOSIS BLD QL SMEAR: (no result)
BAND#(MANUAL): 0.01 X10^3/UL
BILIRUB SERPL-MCNC: 0.4 MG/DL (ref 0.2–1)
CALCIUM SERPL-MCNC: 8.8 MG/DL (ref 8.5–10.1)
CHLORIDE SERPL-SCNC: 109 MMOL/L (ref 98–107)
CREAT SERPL-MCNC: 0.51 MG/DL (ref 0.55–1.02)
ERYTHROCYTE [DISTWIDTH] IN BLOOD BY AUTOMATED COUNT: 15.4 % (ref 9.6–15.2)
LYMPH#(MANUAL): 0.24 X10^3/UL (ref 1–3.4)
LYMPHS% (MANUAL): 79 % (ref 22–44)
MCH RBC QN AUTO: 33 PG (ref 27–34.8)
MCHC RBC AUTO-ENTMCNC: 35.3 G/DL (ref 32.4–35.8)
MCV RBC AUTO: 93.4 FL (ref 80–100)
MD: YES
MONOS#(MANUAL): 0 X10^3/UL (ref 0.3–2.7)
MONOS% (MANUAL): 1 % (ref 2–9)
NEUTS BAND NFR BLD: 3 % (ref 0–7)
PLATELET # BLD AUTO: 10 X10^3/UL (ref 130–400)
PMV BLD AUTO: 8.7 FL (ref 7.4–10.4)
PROT SERPL-MCNC: 5.7 G/DL (ref 6.4–8.2)
RBC # BLD AUTO: 2.58 X10^6/UL (ref 3.82–5.3)
SEG#(MANUAL): 0.05 X10^3/UL (ref 1.8–6.8)
SEGS% (MANUAL): 17 % (ref 42–75)

## 2019-05-09 RX ADMIN — ONDANSETRON HYDROCHLORIDE SCH MLS/HR: 2 INJECTION, SOLUTION INTRAMUSCULAR; INTRAVENOUS at 20:30

## 2019-05-09 RX ADMIN — OXYCODONE HYDROCHLORIDE AND ACETAMINOPHEN PRN TAB: 7.5; 325 TABLET ORAL at 05:35

## 2019-05-09 RX ADMIN — MEROPENEM SCH MLS/HR: 1 INJECTION, POWDER, FOR SOLUTION INTRAVENOUS at 03:35

## 2019-05-09 RX ADMIN — SODIUM CHLORIDE SCH MLS/HR: 0.9 INJECTION, SOLUTION INTRAVENOUS at 10:33

## 2019-05-09 RX ADMIN — MENTHOL, METHYL SALICYLATE SCH APPLIC: 10; 15 CREAM TOPICAL at 08:04

## 2019-05-09 RX ADMIN — DIPHENHYDRAMINE HYDROCHLORIDE PRN MG: 25 CAPSULE ORAL at 22:33

## 2019-05-09 RX ADMIN — IDARUBICIN HYDROCHLORIDE SCH MLS/HR: 1 SOLUTION INTRAVENOUS at 22:06

## 2019-05-09 RX ADMIN — OXYCODONE HYDROCHLORIDE AND ACETAMINOPHEN PRN TAB: 7.5; 325 TABLET ORAL at 19:50

## 2019-05-09 RX ADMIN — MEROPENEM SCH MLS/HR: 1 INJECTION, POWDER, FOR SOLUTION INTRAVENOUS at 10:32

## 2019-05-09 RX ADMIN — ALLOPURINOL SCH MG: 300 TABLET ORAL at 16:10

## 2019-05-09 RX ADMIN — SODIUM CHLORIDE SCH MLS/HR: 0.9 INJECTION, SOLUTION INTRAVENOUS at 22:33

## 2019-05-09 RX ADMIN — MENTHOL, METHYL SALICYLATE SCH APPLIC: 10; 15 CREAM TOPICAL at 19:51

## 2019-05-09 RX ADMIN — MENTHOL, METHYL SALICYLATE SCH APPLIC: 10; 15 CREAM TOPICAL at 16:08

## 2019-05-09 RX ADMIN — PANTOPRAZOLE SODIUM SCH MG: 40 TABLET, DELAYED RELEASE ORAL at 05:35

## 2019-05-09 RX ADMIN — MEROPENEM SCH MLS/HR: 1 INJECTION, POWDER, FOR SOLUTION INTRAVENOUS at 19:51

## 2019-05-10 VITALS — DIASTOLIC BLOOD PRESSURE: 69 MMHG | SYSTOLIC BLOOD PRESSURE: 124 MMHG

## 2019-05-10 VITALS — SYSTOLIC BLOOD PRESSURE: 147 MMHG | DIASTOLIC BLOOD PRESSURE: 82 MMHG

## 2019-05-10 VITALS — SYSTOLIC BLOOD PRESSURE: 102 MMHG | DIASTOLIC BLOOD PRESSURE: 75 MMHG

## 2019-05-10 VITALS — DIASTOLIC BLOOD PRESSURE: 76 MMHG | SYSTOLIC BLOOD PRESSURE: 129 MMHG

## 2019-05-10 VITALS — DIASTOLIC BLOOD PRESSURE: 66 MMHG | SYSTOLIC BLOOD PRESSURE: 103 MMHG

## 2019-05-10 VITALS — SYSTOLIC BLOOD PRESSURE: 95 MMHG | DIASTOLIC BLOOD PRESSURE: 57 MMHG

## 2019-05-10 VITALS — SYSTOLIC BLOOD PRESSURE: 112 MMHG | DIASTOLIC BLOOD PRESSURE: 64 MMHG

## 2019-05-10 LAB
<PLATELET ESTIMATE>: (no result)
<PLT MORPHOLOGY>: (no result)
ALBUMIN SERPL-MCNC: 2.2 G/DL (ref 3.4–5)
ALP SERPL-CCNC: 101 U/L (ref 45–117)
ALT SERPL-CCNC: 34 U/L (ref 12–78)
ANION GAP SERPL CALC-SCNC: 5 MMOL/L (ref 5–15)
ANISOCYTOSIS BLD QL SMEAR: (no result)
BAND#(MANUAL): 0.01 X10^3/UL
BILIRUB SERPL-MCNC: 0.5 MG/DL (ref 0.2–1)
CALCIUM SERPL-MCNC: 8.8 MG/DL (ref 8.5–10.1)
CHLORIDE SERPL-SCNC: 110 MMOL/L (ref 98–107)
CREAT SERPL-MCNC: 0.49 MG/DL (ref 0.55–1.02)
ERYTHROCYTE [DISTWIDTH] IN BLOOD BY AUTOMATED COUNT: 15.5 % (ref 9.6–15.2)
LYMPH#(MANUAL): 0.19 X10^3/UL (ref 1–3.4)
LYMPHS% (MANUAL): 62 % (ref 22–44)
MCH RBC QN AUTO: 32.9 PG (ref 27–34.8)
MCHC RBC AUTO-ENTMCNC: 35 G/DL (ref 32.4–35.8)
MCV RBC AUTO: 94.1 FL (ref 80–100)
MD: YES
MONOS#(MANUAL): 0.01 X10^3/UL (ref 0.3–2.7)
MONOS% (MANUAL): 2 % (ref 2–9)
NEUTS BAND NFR BLD: 4 % (ref 0–7)
PLATELET # BLD AUTO: 9 X10^3/UL (ref 130–400)
PMV BLD AUTO: 9.9 FL (ref 7.4–10.4)
PROT SERPL-MCNC: 5.2 G/DL (ref 6.4–8.2)
RBC # BLD AUTO: 2.23 X10^6/UL (ref 3.82–5.3)
SEG#(MANUAL): 0.1 X10^3/UL (ref 1.8–6.8)
SEGS% (MANUAL): 32 % (ref 42–75)

## 2019-05-10 RX ADMIN — OXYCODONE HYDROCHLORIDE AND ACETAMINOPHEN PRN TAB: 7.5; 325 TABLET ORAL at 22:24

## 2019-05-10 RX ADMIN — PANTOPRAZOLE SODIUM SCH MG: 40 TABLET, DELAYED RELEASE ORAL at 05:57

## 2019-05-10 RX ADMIN — OXYCODONE HYDROCHLORIDE AND ACETAMINOPHEN PRN TAB: 7.5; 325 TABLET ORAL at 08:00

## 2019-05-10 RX ADMIN — CYTARABINE SCH MLS/HR: 100 INJECTION, SOLUTION INTRATHECAL; INTRAVENOUS; SUBCUTANEOUS at 00:08

## 2019-05-10 RX ADMIN — MENTHOL, METHYL SALICYLATE SCH APPLIC: 10; 15 CREAM TOPICAL at 19:53

## 2019-05-10 RX ADMIN — MENTHOL, METHYL SALICYLATE SCH APPLIC: 10; 15 CREAM TOPICAL at 16:00

## 2019-05-10 RX ADMIN — MENTHOL, METHYL SALICYLATE SCH APPLIC: 10; 15 CREAM TOPICAL at 08:01

## 2019-05-10 RX ADMIN — ACETAMINOPHEN PRN MG: 325 TABLET, FILM COATED ORAL at 15:23

## 2019-05-10 RX ADMIN — IDARUBICIN HYDROCHLORIDE SCH MLS/HR: 1 SOLUTION INTRAVENOUS at 21:54

## 2019-05-10 RX ADMIN — SODIUM CHLORIDE SCH MLS/HR: 0.9 INJECTION, SOLUTION INTRAVENOUS at 20:50

## 2019-05-10 RX ADMIN — ALLOPURINOL SCH MG: 300 TABLET ORAL at 17:53

## 2019-05-10 RX ADMIN — DIPHENHYDRAMINE HYDROCHLORIDE PRN MG: 25 CAPSULE ORAL at 15:24

## 2019-05-10 RX ADMIN — MEROPENEM SCH MLS/HR: 1 INJECTION, POWDER, FOR SOLUTION INTRAVENOUS at 11:43

## 2019-05-10 RX ADMIN — SODIUM CHLORIDE SCH MLS/HR: 0.9 INJECTION, SOLUTION INTRAVENOUS at 08:01

## 2019-05-10 RX ADMIN — MEROPENEM SCH MLS/HR: 1 INJECTION, POWDER, FOR SOLUTION INTRAVENOUS at 19:48

## 2019-05-10 RX ADMIN — ONDANSETRON HYDROCHLORIDE SCH MLS/HR: 2 INJECTION, SOLUTION INTRAMUSCULAR; INTRAVENOUS at 20:46

## 2019-05-10 RX ADMIN — OXYCODONE HYDROCHLORIDE AND ACETAMINOPHEN PRN TAB: 7.5; 325 TABLET ORAL at 02:11

## 2019-05-10 RX ADMIN — MEROPENEM SCH MLS/HR: 1 INJECTION, POWDER, FOR SOLUTION INTRAVENOUS at 03:44

## 2019-05-11 VITALS — SYSTOLIC BLOOD PRESSURE: 122 MMHG | DIASTOLIC BLOOD PRESSURE: 73 MMHG

## 2019-05-11 VITALS — SYSTOLIC BLOOD PRESSURE: 95 MMHG | DIASTOLIC BLOOD PRESSURE: 59 MMHG

## 2019-05-11 VITALS — SYSTOLIC BLOOD PRESSURE: 121 MMHG | DIASTOLIC BLOOD PRESSURE: 71 MMHG

## 2019-05-11 VITALS — SYSTOLIC BLOOD PRESSURE: 127 MMHG | DIASTOLIC BLOOD PRESSURE: 78 MMHG

## 2019-05-11 LAB
<PLATELET ESTIMATE>: (no result)
<PLT MORPHOLOGY>: (no result)
ALBUMIN SERPL-MCNC: 2.4 G/DL (ref 3.4–5)
ALP SERPL-CCNC: 110 U/L (ref 45–117)
ALT SERPL-CCNC: 28 U/L (ref 12–78)
ANION GAP SERPL CALC-SCNC: 4 MMOL/L (ref 5–15)
ANISOCYTOSIS BLD QL SMEAR: (no result)
BAND#(MANUAL): 0.01 X10^3/UL
BILIRUB SERPL-MCNC: 0.3 MG/DL (ref 0.2–1)
CALCIUM SERPL-MCNC: 8.9 MG/DL (ref 8.5–10.1)
CHLORIDE SERPL-SCNC: 110 MMOL/L (ref 98–107)
CREAT SERPL-MCNC: 0.53 MG/DL (ref 0.55–1.02)
ERYTHROCYTE [DISTWIDTH] IN BLOOD BY AUTOMATED COUNT: 15.6 % (ref 9.6–15.2)
LYMPH#(MANUAL): 0.17 X10^3/UL (ref 1–3.4)
LYMPHS% (MANUAL): 58 % (ref 22–44)
MCH RBC QN AUTO: 32.9 PG (ref 27–34.8)
MCHC RBC AUTO-ENTMCNC: 34.9 G/DL (ref 32.4–35.8)
MCV RBC AUTO: 94.3 FL (ref 80–100)
MD: YES
MONOS#(MANUAL): 0.01 X10^3/UL (ref 0.3–2.7)
MONOS% (MANUAL): 4 % (ref 2–9)
NEUTS BAND NFR BLD: 2 % (ref 0–7)
PLATELET # BLD AUTO: 32 X10^3/UL (ref 130–400)
PMV BLD AUTO: 9.2 FL (ref 7.4–10.4)
PROT SERPL-MCNC: 5.3 G/DL (ref 6.4–8.2)
RBC # BLD AUTO: 2.16 X10^6/UL (ref 3.82–5.3)
SEG#(MANUAL): 0.11 X10^3/UL (ref 1.8–6.8)
SEGS% (MANUAL): 36 % (ref 42–75)

## 2019-05-11 RX ADMIN — Medication PRN ML: at 17:16

## 2019-05-11 RX ADMIN — MEROPENEM SCH MLS/HR: 1 INJECTION, POWDER, FOR SOLUTION INTRAVENOUS at 19:53

## 2019-05-11 RX ADMIN — MENTHOL, METHYL SALICYLATE SCH APPLIC: 10; 15 CREAM TOPICAL at 22:35

## 2019-05-11 RX ADMIN — MENTHOL, METHYL SALICYLATE SCH APPLIC: 10; 15 CREAM TOPICAL at 17:15

## 2019-05-11 RX ADMIN — MEROPENEM SCH MLS/HR: 1 INJECTION, POWDER, FOR SOLUTION INTRAVENOUS at 12:21

## 2019-05-11 RX ADMIN — MENTHOL, METHYL SALICYLATE SCH APPLIC: 10; 15 CREAM TOPICAL at 09:56

## 2019-05-11 RX ADMIN — OXYCODONE HYDROCHLORIDE AND ACETAMINOPHEN PRN TAB: 7.5; 325 TABLET ORAL at 22:35

## 2019-05-11 RX ADMIN — SODIUM CHLORIDE SCH MLS/HR: 0.9 INJECTION, SOLUTION INTRAVENOUS at 17:16

## 2019-05-11 RX ADMIN — MEROPENEM SCH MLS/HR: 1 INJECTION, POWDER, FOR SOLUTION INTRAVENOUS at 04:04

## 2019-05-11 RX ADMIN — SODIUM CHLORIDE SCH MLS/HR: 0.9 INJECTION, SOLUTION INTRAVENOUS at 09:56

## 2019-05-11 RX ADMIN — CYTARABINE SCH MLS/HR: 100 INJECTION, SOLUTION INTRATHECAL; INTRAVENOUS; SUBCUTANEOUS at 00:42

## 2019-05-11 RX ADMIN — PANTOPRAZOLE SODIUM SCH MG: 40 TABLET, DELAYED RELEASE ORAL at 05:32

## 2019-05-11 RX ADMIN — ALLOPURINOL SCH MG: 300 TABLET ORAL at 17:16

## 2019-05-11 RX ADMIN — ONDANSETRON HYDROCHLORIDE SCH MLS/HR: 2 INJECTION, SOLUTION INTRAMUSCULAR; INTRAVENOUS at 22:35

## 2019-05-11 RX ADMIN — DIPHENHYDRAMINE HYDROCHLORIDE PRN MG: 25 CAPSULE ORAL at 00:47

## 2019-05-12 VITALS — SYSTOLIC BLOOD PRESSURE: 102 MMHG | DIASTOLIC BLOOD PRESSURE: 61 MMHG

## 2019-05-12 VITALS — DIASTOLIC BLOOD PRESSURE: 75 MMHG | SYSTOLIC BLOOD PRESSURE: 120 MMHG

## 2019-05-12 VITALS — SYSTOLIC BLOOD PRESSURE: 96 MMHG | DIASTOLIC BLOOD PRESSURE: 58 MMHG

## 2019-05-12 VITALS — SYSTOLIC BLOOD PRESSURE: 120 MMHG | DIASTOLIC BLOOD PRESSURE: 71 MMHG

## 2019-05-12 VITALS — SYSTOLIC BLOOD PRESSURE: 113 MMHG | DIASTOLIC BLOOD PRESSURE: 68 MMHG

## 2019-05-12 VITALS — SYSTOLIC BLOOD PRESSURE: 109 MMHG | DIASTOLIC BLOOD PRESSURE: 50 MMHG

## 2019-05-12 LAB
<PLATELET ESTIMATE>: (no result)
<PLT MORPHOLOGY>: (no result)
ALBUMIN SERPL-MCNC: 2.4 G/DL (ref 3.4–5)
ALP SERPL-CCNC: 109 U/L (ref 45–117)
ALT SERPL-CCNC: 23 U/L (ref 12–78)
ANION GAP SERPL CALC-SCNC: 3 MMOL/L (ref 5–15)
ANISOCYTOSIS BLD QL SMEAR: (no result)
BAND#(MANUAL): 0 X10^3/UL
BILIRUB SERPL-MCNC: 0.6 MG/DL (ref 0.2–1)
CALCIUM SERPL-MCNC: 8.7 MG/DL (ref 8.5–10.1)
CHLORIDE SERPL-SCNC: 109 MMOL/L (ref 98–107)
CREAT SERPL-MCNC: 0.43 MG/DL (ref 0.55–1.02)
ERYTHROCYTE [DISTWIDTH] IN BLOOD BY AUTOMATED COUNT: 14.8 % (ref 9.6–15.2)
LYMPH#(MANUAL): 0.11 X10^3/UL (ref 1–3.4)
LYMPHS% (MANUAL): 56 % (ref 22–44)
MCH RBC QN AUTO: 32.7 PG (ref 27–34.8)
MCHC RBC AUTO-ENTMCNC: 35.1 G/DL (ref 32.4–35.8)
MCV RBC AUTO: 93.3 FL (ref 80–100)
MD: YES
NEUTS BAND NFR BLD: 2 % (ref 0–7)
PLATELET # BLD AUTO: 24 X10^3/UL (ref 130–400)
PMV BLD AUTO: 9 FL (ref 7.4–10.4)
PROT SERPL-MCNC: 5.3 G/DL (ref 6.4–8.2)
RBC # BLD AUTO: 2.08 X10^6/UL (ref 3.82–5.3)
SEG#(MANUAL): 0.08 X10^3/UL (ref 1.8–6.8)
SEGS% (MANUAL): 42 % (ref 42–75)

## 2019-05-12 RX ADMIN — MENTHOL, METHYL SALICYLATE SCH APPLIC: 10; 15 CREAM TOPICAL at 21:04

## 2019-05-12 RX ADMIN — SODIUM CHLORIDE SCH MLS/HR: 0.9 INJECTION, SOLUTION INTRAVENOUS at 21:04

## 2019-05-12 RX ADMIN — ONDANSETRON HYDROCHLORIDE SCH MLS/HR: 2 INJECTION, SOLUTION INTRAMUSCULAR; INTRAVENOUS at 21:03

## 2019-05-12 RX ADMIN — SODIUM CHLORIDE SCH MLS/HR: 0.9 INJECTION, SOLUTION INTRAVENOUS at 07:31

## 2019-05-12 RX ADMIN — ACETAMINOPHEN PRN MG: 325 TABLET, FILM COATED ORAL at 07:30

## 2019-05-12 RX ADMIN — PANTOPRAZOLE SODIUM SCH MG: 40 TABLET, DELAYED RELEASE ORAL at 05:43

## 2019-05-12 RX ADMIN — CYTARABINE SCH MLS/HR: 100 INJECTION, SOLUTION INTRATHECAL; INTRAVENOUS; SUBCUTANEOUS at 00:44

## 2019-05-12 RX ADMIN — DIPHENHYDRAMINE HYDROCHLORIDE PRN MG: 25 CAPSULE ORAL at 00:50

## 2019-05-12 RX ADMIN — MENTHOL, METHYL SALICYLATE SCH APPLIC: 10; 15 CREAM TOPICAL at 07:31

## 2019-05-12 RX ADMIN — MEROPENEM SCH MLS/HR: 1 INJECTION, POWDER, FOR SOLUTION INTRAVENOUS at 03:49

## 2019-05-12 RX ADMIN — MEROPENEM SCH MLS/HR: 1 INJECTION, POWDER, FOR SOLUTION INTRAVENOUS at 13:52

## 2019-05-12 RX ADMIN — MEROPENEM SCH MLS/HR: 1 INJECTION, POWDER, FOR SOLUTION INTRAVENOUS at 19:31

## 2019-05-12 RX ADMIN — DIPHENHYDRAMINE HYDROCHLORIDE PRN MG: 25 CAPSULE ORAL at 07:30

## 2019-05-12 RX ADMIN — ALLOPURINOL SCH MG: 300 TABLET ORAL at 17:50

## 2019-05-12 RX ADMIN — MENTHOL, METHYL SALICYLATE SCH APPLIC: 10; 15 CREAM TOPICAL at 16:00

## 2019-05-12 RX ADMIN — OXYCODONE HYDROCHLORIDE AND ACETAMINOPHEN PRN TAB: 7.5; 325 TABLET ORAL at 19:39

## 2019-05-13 VITALS — DIASTOLIC BLOOD PRESSURE: 83 MMHG | SYSTOLIC BLOOD PRESSURE: 152 MMHG

## 2019-05-13 VITALS — SYSTOLIC BLOOD PRESSURE: 140 MMHG | DIASTOLIC BLOOD PRESSURE: 57 MMHG

## 2019-05-13 VITALS — DIASTOLIC BLOOD PRESSURE: 56 MMHG | SYSTOLIC BLOOD PRESSURE: 100 MMHG

## 2019-05-13 VITALS — SYSTOLIC BLOOD PRESSURE: 127 MMHG | DIASTOLIC BLOOD PRESSURE: 75 MMHG

## 2019-05-13 LAB
<PLATELET ESTIMATE>: (no result)
<PLT MORPHOLOGY>: (no result)
ALBUMIN SERPL-MCNC: 2.4 G/DL (ref 3.4–5)
ALP SERPL-CCNC: 112 U/L (ref 45–117)
ALT SERPL-CCNC: 20 U/L (ref 12–78)
ANION GAP SERPL CALC-SCNC: 5 MMOL/L (ref 5–15)
ANISOCYTOSIS BLD QL SMEAR: (no result)
BAND#(MANUAL): 0 X10^3/UL
BILIRUB SERPL-MCNC: 0.8 MG/DL (ref 0.2–1)
CALCIUM SERPL-MCNC: 8.5 MG/DL (ref 8.5–10.1)
CHLORIDE SERPL-SCNC: 111 MMOL/L (ref 98–107)
CREAT SERPL-MCNC: 0.44 MG/DL (ref 0.55–1.02)
ERYTHROCYTE [DISTWIDTH] IN BLOOD BY AUTOMATED COUNT: 15.4 % (ref 9.6–15.2)
LYMPH#(MANUAL): 0.15 X10^3/UL (ref 1–3.4)
LYMPHS% (MANUAL): 74 % (ref 22–44)
MCH RBC QN AUTO: 32.4 PG (ref 27–34.8)
MCHC RBC AUTO-ENTMCNC: 35 G/DL (ref 32.4–35.8)
MCV RBC AUTO: 92.5 FL (ref 80–100)
MONOS#(MANUAL): 0.01 X10^3/UL (ref 0.3–2.7)
MONOS% (MANUAL): 4 % (ref 2–9)
NEUTS BAND NFR BLD: 1 % (ref 0–7)
PLATELET # BLD AUTO: 17 X10^3/UL (ref 130–400)
PMV BLD AUTO: 9.7 FL (ref 7.4–10.4)
PROT SERPL-MCNC: 5.4 G/DL (ref 6.4–8.2)
RBC # BLD AUTO: 2.37 X10^6/UL (ref 3.82–5.3)
SEG#(MANUAL): 0.04 X10^3/UL (ref 1.8–6.8)
SEGS% (MANUAL): 21 % (ref 42–75)

## 2019-05-13 RX ADMIN — SODIUM CHLORIDE SCH MLS/HR: 0.9 INJECTION, SOLUTION INTRAVENOUS at 09:42

## 2019-05-13 RX ADMIN — ALLOPURINOL SCH MG: 300 TABLET ORAL at 16:51

## 2019-05-13 RX ADMIN — MEROPENEM SCH MLS/HR: 1 INJECTION, POWDER, FOR SOLUTION INTRAVENOUS at 20:43

## 2019-05-13 RX ADMIN — CYTARABINE SCH MLS/HR: 100 INJECTION, SOLUTION INTRATHECAL; INTRAVENOUS; SUBCUTANEOUS at 01:42

## 2019-05-13 RX ADMIN — MENTHOL, METHYL SALICYLATE SCH APPLIC: 10; 15 CREAM TOPICAL at 16:51

## 2019-05-13 RX ADMIN — OXYCODONE HYDROCHLORIDE AND ACETAMINOPHEN PRN TAB: 7.5; 325 TABLET ORAL at 21:06

## 2019-05-13 RX ADMIN — PANTOPRAZOLE SODIUM SCH MG: 40 TABLET, DELAYED RELEASE ORAL at 05:44

## 2019-05-13 RX ADMIN — MEROPENEM SCH MLS/HR: 1 INJECTION, POWDER, FOR SOLUTION INTRAVENOUS at 03:43

## 2019-05-13 RX ADMIN — SODIUM CHLORIDE SCH MLS/HR: 0.9 INJECTION, SOLUTION INTRAVENOUS at 18:15

## 2019-05-13 RX ADMIN — MENTHOL, METHYL SALICYLATE SCH APPLIC: 10; 15 CREAM TOPICAL at 20:44

## 2019-05-13 RX ADMIN — MEROPENEM SCH MLS/HR: 1 INJECTION, POWDER, FOR SOLUTION INTRAVENOUS at 11:58

## 2019-05-13 RX ADMIN — MENTHOL, METHYL SALICYLATE SCH APPLIC: 10; 15 CREAM TOPICAL at 09:42

## 2019-05-13 RX ADMIN — TEMAZEPAM PRN MG: 15 CAPSULE ORAL at 22:28

## 2019-05-14 VITALS — DIASTOLIC BLOOD PRESSURE: 60 MMHG | SYSTOLIC BLOOD PRESSURE: 99 MMHG

## 2019-05-14 VITALS — DIASTOLIC BLOOD PRESSURE: 82 MMHG | SYSTOLIC BLOOD PRESSURE: 141 MMHG

## 2019-05-14 VITALS — DIASTOLIC BLOOD PRESSURE: 68 MMHG | SYSTOLIC BLOOD PRESSURE: 148 MMHG

## 2019-05-14 VITALS — DIASTOLIC BLOOD PRESSURE: 61 MMHG | SYSTOLIC BLOOD PRESSURE: 104 MMHG

## 2019-05-14 LAB
<PLATELET ESTIMATE>: (no result)
<PLT MORPHOLOGY>: (no result)
ALBUMIN SERPL-MCNC: 2.7 G/DL (ref 3.4–5)
ALP SERPL-CCNC: 119 U/L (ref 45–117)
ALT SERPL-CCNC: 19 U/L (ref 12–78)
ANION GAP SERPL CALC-SCNC: 4 MMOL/L (ref 5–15)
ANISOCYTOSIS BLD QL SMEAR: (no result)
BILIRUB SERPL-MCNC: 1.2 MG/DL (ref 0.2–1)
CALCIUM SERPL-MCNC: 9 MG/DL (ref 8.5–10.1)
CHLORIDE SERPL-SCNC: 108 MMOL/L (ref 98–107)
CREAT SERPL-MCNC: 0.51 MG/DL (ref 0.55–1.02)
EOS#(MANUAL): 0 X10^3/UL (ref 0–0.4)
EOS% (MANUAL): 1 % (ref 1–7)
ERYTHROCYTE [DISTWIDTH] IN BLOOD BY AUTOMATED COUNT: 14.8 % (ref 9.6–15.2)
LYMPH#(MANUAL): 0.23 X10^3/UL (ref 1–3.4)
LYMPHS% (MANUAL): 75 % (ref 22–44)
MCH RBC QN AUTO: 32.5 PG (ref 27–34.8)
MCHC RBC AUTO-ENTMCNC: 35.2 G/DL (ref 32.4–35.8)
MCV RBC AUTO: 92.3 FL (ref 80–100)
MD: YES
MONOS#(MANUAL): 0.02 X10^3/UL (ref 0.3–2.7)
MONOS% (MANUAL): 6 % (ref 2–9)
OVALOCYTES BLD QL SMEAR: (no result)
PLATELET # BLD AUTO: 13 X10^3/UL (ref 130–400)
PMV BLD AUTO: 9.4 FL (ref 7.4–10.4)
PROT SERPL-MCNC: 5.5 G/DL (ref 6.4–8.2)
RBC # BLD AUTO: 2.46 X10^6/UL (ref 3.82–5.3)
SEG#(MANUAL): 0.05 X10^3/UL (ref 1.8–6.8)
SEGS% (MANUAL): 18 % (ref 42–75)

## 2019-05-14 RX ADMIN — ALLOPURINOL SCH MG: 300 TABLET ORAL at 16:50

## 2019-05-14 RX ADMIN — MEROPENEM SCH MLS/HR: 1 INJECTION, POWDER, FOR SOLUTION INTRAVENOUS at 04:00

## 2019-05-14 RX ADMIN — CYTARABINE SCH MLS/HR: 100 INJECTION, SOLUTION INTRATHECAL; INTRAVENOUS; SUBCUTANEOUS at 02:14

## 2019-05-14 RX ADMIN — SODIUM CHLORIDE SCH MLS/HR: 0.9 INJECTION, SOLUTION INTRAVENOUS at 14:17

## 2019-05-14 RX ADMIN — MENTHOL, METHYL SALICYLATE SCH APPLIC: 10; 15 CREAM TOPICAL at 21:00

## 2019-05-14 RX ADMIN — OXYCODONE HYDROCHLORIDE AND ACETAMINOPHEN PRN TAB: 7.5; 325 TABLET ORAL at 16:50

## 2019-05-14 RX ADMIN — SODIUM CHLORIDE SCH MLS/HR: 0.9 INJECTION, SOLUTION INTRAVENOUS at 05:49

## 2019-05-14 RX ADMIN — MENTHOL, METHYL SALICYLATE SCH APPLIC: 10; 15 CREAM TOPICAL at 16:50

## 2019-05-14 RX ADMIN — PANTOPRAZOLE SODIUM SCH MG: 40 TABLET, DELAYED RELEASE ORAL at 05:48

## 2019-05-14 RX ADMIN — CEFEPIME SCH MLS/HR: 2 INJECTION, POWDER, FOR SOLUTION INTRAVENOUS at 16:50

## 2019-05-14 RX ADMIN — CEFEPIME SCH MLS/HR: 2 INJECTION, POWDER, FOR SOLUTION INTRAVENOUS at 10:40

## 2019-05-14 RX ADMIN — ONDANSETRON HYDROCHLORIDE SCH MLS/HR: 2 INJECTION, SOLUTION INTRAMUSCULAR; INTRAVENOUS at 01:40

## 2019-05-14 RX ADMIN — MENTHOL, METHYL SALICYLATE SCH APPLIC: 10; 15 CREAM TOPICAL at 09:05

## 2019-05-14 RX ADMIN — ONDANSETRON PRN MG: 2 INJECTION, SOLUTION INTRAMUSCULAR; INTRAVENOUS at 18:54

## 2019-05-15 VITALS — DIASTOLIC BLOOD PRESSURE: 69 MMHG | SYSTOLIC BLOOD PRESSURE: 117 MMHG

## 2019-05-15 VITALS — SYSTOLIC BLOOD PRESSURE: 121 MMHG | DIASTOLIC BLOOD PRESSURE: 61 MMHG

## 2019-05-15 VITALS — SYSTOLIC BLOOD PRESSURE: 107 MMHG | DIASTOLIC BLOOD PRESSURE: 56 MMHG

## 2019-05-15 VITALS — DIASTOLIC BLOOD PRESSURE: 59 MMHG | SYSTOLIC BLOOD PRESSURE: 112 MMHG

## 2019-05-15 VITALS — DIASTOLIC BLOOD PRESSURE: 73 MMHG | SYSTOLIC BLOOD PRESSURE: 132 MMHG

## 2019-05-15 VITALS — SYSTOLIC BLOOD PRESSURE: 113 MMHG | DIASTOLIC BLOOD PRESSURE: 69 MMHG

## 2019-05-15 VITALS — SYSTOLIC BLOOD PRESSURE: 120 MMHG | DIASTOLIC BLOOD PRESSURE: 61 MMHG

## 2019-05-15 VITALS — DIASTOLIC BLOOD PRESSURE: 55 MMHG | SYSTOLIC BLOOD PRESSURE: 99 MMHG

## 2019-05-15 VITALS — SYSTOLIC BLOOD PRESSURE: 113 MMHG | DIASTOLIC BLOOD PRESSURE: 66 MMHG

## 2019-05-15 VITALS — DIASTOLIC BLOOD PRESSURE: 67 MMHG | SYSTOLIC BLOOD PRESSURE: 105 MMHG

## 2019-05-15 LAB
<PLATELET ESTIMATE>: (no result)
<PLT MORPHOLOGY>: (no result)
ALBUMIN SERPL-MCNC: 2.5 G/DL (ref 3.4–5)
ALP SERPL-CCNC: 103 U/L (ref 45–117)
ALT SERPL-CCNC: 13 U/L (ref 12–78)
ANION GAP SERPL CALC-SCNC: 5 MMOL/L (ref 5–15)
ANISOCYTOSIS BLD QL SMEAR: (no result)
BAND#(MANUAL): 0.01 X10^3/UL
BILIRUB SERPL-MCNC: 0.5 MG/DL (ref 0.2–1)
CALCIUM SERPL-MCNC: 8.7 MG/DL (ref 8.5–10.1)
CHLORIDE SERPL-SCNC: 109 MMOL/L (ref 98–107)
CREAT SERPL-MCNC: 0.5 MG/DL (ref 0.55–1.02)
ERYTHROCYTE [DISTWIDTH] IN BLOOD BY AUTOMATED COUNT: 14.4 % (ref 9.6–15.2)
LYMPH#(MANUAL): 0.19 X10^3/UL (ref 1–3.4)
LYMPHS% (MANUAL): 96 % (ref 22–44)
MCH RBC QN AUTO: 32.4 PG (ref 27–34.8)
MCHC RBC AUTO-ENTMCNC: 35.1 G/DL (ref 32.4–35.8)
MCV RBC AUTO: 92.3 FL (ref 80–100)
MD: YES
NEUTS BAND NFR BLD: 4 % (ref 0–7)
OVALOCYTES BLD QL SMEAR: (no result)
PLATELET # BLD AUTO: 8 X10^3/UL (ref 130–400)
PMV BLD AUTO: 10 FL (ref 7.4–10.4)
PROT SERPL-MCNC: 5.4 G/DL (ref 6.4–8.2)
RBC # BLD AUTO: 2.01 X10^6/UL (ref 3.82–5.3)

## 2019-05-15 RX ADMIN — CEFEPIME SCH MLS/HR: 2 INJECTION, POWDER, FOR SOLUTION INTRAVENOUS at 08:13

## 2019-05-15 RX ADMIN — SODIUM CHLORIDE SCH MLS/HR: 0.9 INJECTION, SOLUTION INTRAVENOUS at 00:06

## 2019-05-15 RX ADMIN — POLYETHYLENE GLYCOL 3350 PRN GM: 17 POWDER, FOR SOLUTION ORAL at 08:55

## 2019-05-15 RX ADMIN — MENTHOL, METHYL SALICYLATE SCH APPLIC: 10; 15 CREAM TOPICAL at 19:30

## 2019-05-15 RX ADMIN — TEMAZEPAM PRN MG: 15 CAPSULE ORAL at 00:18

## 2019-05-15 RX ADMIN — ALLOPURINOL SCH MG: 300 TABLET ORAL at 16:48

## 2019-05-15 RX ADMIN — ONDANSETRON HYDROCHLORIDE SCH MLS/HR: 2 INJECTION, SOLUTION INTRAMUSCULAR; INTRAVENOUS at 03:41

## 2019-05-15 RX ADMIN — MENTHOL, METHYL SALICYLATE SCH APPLIC: 10; 15 CREAM TOPICAL at 16:36

## 2019-05-15 RX ADMIN — ONDANSETRON PRN MG: 2 INJECTION, SOLUTION INTRAMUSCULAR; INTRAVENOUS at 19:30

## 2019-05-15 RX ADMIN — TEMAZEPAM PRN MG: 15 CAPSULE ORAL at 19:47

## 2019-05-15 RX ADMIN — CEFEPIME SCH MLS/HR: 2 INJECTION, POWDER, FOR SOLUTION INTRAVENOUS at 16:36

## 2019-05-15 RX ADMIN — CYTARABINE SCH MLS/HR: 100 INJECTION, SOLUTION INTRATHECAL; INTRAVENOUS; SUBCUTANEOUS at 03:56

## 2019-05-15 RX ADMIN — PANTOPRAZOLE SODIUM SCH MG: 40 TABLET, DELAYED RELEASE ORAL at 06:09

## 2019-05-15 RX ADMIN — CEFEPIME SCH MLS/HR: 2 INJECTION, POWDER, FOR SOLUTION INTRAVENOUS at 01:11

## 2019-05-15 RX ADMIN — DIPHENHYDRAMINE HYDROCHLORIDE PRN MG: 25 CAPSULE ORAL at 08:34

## 2019-05-15 RX ADMIN — SODIUM CHLORIDE SCH MLS/HR: 0.9 INJECTION, SOLUTION INTRAVENOUS at 16:32

## 2019-05-15 RX ADMIN — ACETAMINOPHEN PRN MG: 325 TABLET, FILM COATED ORAL at 08:33

## 2019-05-15 RX ADMIN — MENTHOL, METHYL SALICYLATE SCH APPLIC: 10; 15 CREAM TOPICAL at 08:34

## 2019-05-15 RX ADMIN — OXYCODONE HYDROCHLORIDE AND ACETAMINOPHEN PRN TAB: 7.5; 325 TABLET ORAL at 21:55

## 2019-05-16 VITALS — DIASTOLIC BLOOD PRESSURE: 59 MMHG | SYSTOLIC BLOOD PRESSURE: 107 MMHG

## 2019-05-16 VITALS — DIASTOLIC BLOOD PRESSURE: 82 MMHG | SYSTOLIC BLOOD PRESSURE: 146 MMHG

## 2019-05-16 VITALS — DIASTOLIC BLOOD PRESSURE: 68 MMHG | SYSTOLIC BLOOD PRESSURE: 115 MMHG

## 2019-05-16 VITALS — DIASTOLIC BLOOD PRESSURE: 60 MMHG | SYSTOLIC BLOOD PRESSURE: 116 MMHG

## 2019-05-16 LAB
<PLATELET ESTIMATE>: (no result)
<PLT MORPHOLOGY>: (no result)
ALBUMIN SERPL-MCNC: 2.6 G/DL (ref 3.4–5)
ALP SERPL-CCNC: 115 U/L (ref 45–117)
ALT SERPL-CCNC: 18 U/L (ref 12–78)
ANION GAP SERPL CALC-SCNC: 7 MMOL/L (ref 5–15)
ANISOCYTOSIS BLD QL SMEAR: (no result)
BILIRUB SERPL-MCNC: 0.6 MG/DL (ref 0.2–1)
CALCIUM SERPL-MCNC: 8.4 MG/DL (ref 8.5–10.1)
CHLORIDE SERPL-SCNC: 107 MMOL/L (ref 98–107)
CREAT SERPL-MCNC: 0.51 MG/DL (ref 0.55–1.02)
ERYTHROCYTE [DISTWIDTH] IN BLOOD BY AUTOMATED COUNT: 15.6 % (ref 9.6–15.2)
LYMPH#(MANUAL): 0.2 X10^3/UL (ref 1–3.4)
LYMPHS% (MANUAL): 100 % (ref 22–44)
MCH RBC QN AUTO: 31.8 PG (ref 27–34.8)
MCHC RBC AUTO-ENTMCNC: 35.6 G/DL (ref 32.4–35.8)
MCV RBC AUTO: 89.5 FL (ref 80–100)
MD: YES
OVALOCYTES BLD QL SMEAR: (no result)
PLATELET # BLD AUTO: 46 X10^3/UL (ref 130–400)
PMV BLD AUTO: 7.1 FL (ref 7.4–10.4)
PROT SERPL-MCNC: 5.6 G/DL (ref 6.4–8.2)
RBC # BLD AUTO: 2.43 X10^6/UL (ref 3.82–5.3)

## 2019-05-16 RX ADMIN — PANTOPRAZOLE SODIUM SCH MG: 40 TABLET, DELAYED RELEASE ORAL at 05:46

## 2019-05-16 RX ADMIN — PROCHLORPERAZINE EDISYLATE PRN MG: 5 INJECTION INTRAMUSCULAR; INTRAVENOUS at 15:17

## 2019-05-16 RX ADMIN — SODIUM CHLORIDE SCH MLS/HR: 0.9 INJECTION, SOLUTION INTRAVENOUS at 23:42

## 2019-05-16 RX ADMIN — MENTHOL, METHYL SALICYLATE SCH APPLIC: 10; 15 CREAM TOPICAL at 20:33

## 2019-05-16 RX ADMIN — OXYCODONE HYDROCHLORIDE AND ACETAMINOPHEN PRN TAB: 7.5; 325 TABLET ORAL at 20:33

## 2019-05-16 RX ADMIN — MENTHOL, METHYL SALICYLATE SCH APPLIC: 10; 15 CREAM TOPICAL at 15:17

## 2019-05-16 RX ADMIN — MENTHOL, METHYL SALICYLATE SCH APPLIC: 10; 15 CREAM TOPICAL at 09:12

## 2019-05-16 RX ADMIN — ALLOPURINOL SCH MG: 300 TABLET ORAL at 17:21

## 2019-05-16 RX ADMIN — SODIUM CHLORIDE SCH MLS/HR: 0.9 INJECTION, SOLUTION INTRAVENOUS at 12:28

## 2019-05-16 RX ADMIN — POLYETHYLENE GLYCOL 3350 PRN GM: 17 POWDER, FOR SOLUTION ORAL at 05:46

## 2019-05-16 RX ADMIN — CEFEPIME SCH MLS/HR: 2 INJECTION, POWDER, FOR SOLUTION INTRAVENOUS at 17:21

## 2019-05-16 RX ADMIN — ONDANSETRON PRN MG: 2 INJECTION, SOLUTION INTRAMUSCULAR; INTRAVENOUS at 13:57

## 2019-05-16 RX ADMIN — TEMAZEPAM PRN MG: 15 CAPSULE ORAL at 21:29

## 2019-05-16 RX ADMIN — CEFEPIME SCH MLS/HR: 2 INJECTION, POWDER, FOR SOLUTION INTRAVENOUS at 09:39

## 2019-05-16 RX ADMIN — CEFEPIME SCH MLS/HR: 2 INJECTION, POWDER, FOR SOLUTION INTRAVENOUS at 01:51

## 2019-05-16 RX ADMIN — ONDANSETRON PRN MG: 2 INJECTION, SOLUTION INTRAMUSCULAR; INTRAVENOUS at 09:11

## 2019-05-16 RX ADMIN — SODIUM CHLORIDE SCH MLS/HR: 0.9 INJECTION, SOLUTION INTRAVENOUS at 01:52

## 2019-05-17 VITALS — DIASTOLIC BLOOD PRESSURE: 49 MMHG | SYSTOLIC BLOOD PRESSURE: 103 MMHG

## 2019-05-17 VITALS — SYSTOLIC BLOOD PRESSURE: 109 MMHG | DIASTOLIC BLOOD PRESSURE: 68 MMHG

## 2019-05-17 VITALS — SYSTOLIC BLOOD PRESSURE: 97 MMHG | DIASTOLIC BLOOD PRESSURE: 65 MMHG

## 2019-05-17 VITALS — SYSTOLIC BLOOD PRESSURE: 108 MMHG | DIASTOLIC BLOOD PRESSURE: 72 MMHG

## 2019-05-17 LAB
<PLATELET ESTIMATE>: (no result)
<PLT MORPHOLOGY>: (no result)
ALBUMIN SERPL-MCNC: 2.6 G/DL (ref 3.4–5)
ALP SERPL-CCNC: 116 U/L (ref 45–117)
ALT SERPL-CCNC: 14 U/L (ref 12–78)
ANION GAP SERPL CALC-SCNC: 6 MMOL/L (ref 5–15)
ANISOCYTOSIS BLD QL SMEAR: (no result)
BILIRUB SERPL-MCNC: 1 MG/DL (ref 0.2–1)
CALCIUM SERPL-MCNC: 8.8 MG/DL (ref 8.5–10.1)
CHLORIDE SERPL-SCNC: 103 MMOL/L (ref 98–107)
CREAT SERPL-MCNC: 0.47 MG/DL (ref 0.55–1.02)
ERYTHROCYTE [DISTWIDTH] IN BLOOD BY AUTOMATED COUNT: 15.1 % (ref 9.6–15.2)
LYMPH#(MANUAL): 0.2 X10^3/UL (ref 1–3.4)
LYMPHS% (MANUAL): 100 % (ref 22–44)
MCH RBC QN AUTO: 31.4 PG (ref 27–34.8)
MCHC RBC AUTO-ENTMCNC: 35 G/DL (ref 32.4–35.8)
MCV RBC AUTO: 89.8 FL (ref 80–100)
MD: YES
OVALOCYTES BLD QL SMEAR: (no result)
PLATELET # BLD AUTO: 36 X10^3/UL (ref 130–400)
PMV BLD AUTO: 7.4 FL (ref 7.4–10.4)
PROT SERPL-MCNC: 6 G/DL (ref 6.4–8.2)
RBC # BLD AUTO: 2.44 X10^6/UL (ref 3.82–5.3)
SEG#(MANUAL): 0 X10^3/UL (ref 1.8–6.8)
SEGS% (MANUAL): 0 % (ref 42–75)

## 2019-05-17 RX ADMIN — MENTHOL, METHYL SALICYLATE SCH APPLIC: 10; 15 CREAM TOPICAL at 08:35

## 2019-05-17 RX ADMIN — MENTHOL, METHYL SALICYLATE SCH APPLIC: 10; 15 CREAM TOPICAL at 16:16

## 2019-05-17 RX ADMIN — CEFEPIME SCH MLS/HR: 2 INJECTION, POWDER, FOR SOLUTION INTRAVENOUS at 10:08

## 2019-05-17 RX ADMIN — MENTHOL, METHYL SALICYLATE SCH APPLIC: 10; 15 CREAM TOPICAL at 20:06

## 2019-05-17 RX ADMIN — ONDANSETRON PRN MG: 2 INJECTION, SOLUTION INTRAMUSCULAR; INTRAVENOUS at 08:49

## 2019-05-17 RX ADMIN — ALLOPURINOL SCH MG: 300 TABLET ORAL at 16:16

## 2019-05-17 RX ADMIN — VORICONAZOLE SCH MG: 200 TABLET, FILM COATED ORAL at 21:52

## 2019-05-17 RX ADMIN — SODIUM CHLORIDE SCH MLS/HR: 0.9 INJECTION, SOLUTION INTRAVENOUS at 10:10

## 2019-05-17 RX ADMIN — SODIUM CHLORIDE SCH MLS/HR: 0.9 INJECTION, SOLUTION INTRAVENOUS at 21:52

## 2019-05-17 RX ADMIN — PROCHLORPERAZINE EDISYLATE PRN MG: 5 INJECTION INTRAMUSCULAR; INTRAVENOUS at 18:29

## 2019-05-17 RX ADMIN — CEFEPIME SCH MLS/HR: 2 INJECTION, POWDER, FOR SOLUTION INTRAVENOUS at 16:16

## 2019-05-17 RX ADMIN — PANTOPRAZOLE SODIUM SCH MG: 40 TABLET, DELAYED RELEASE ORAL at 05:14

## 2019-05-17 RX ADMIN — CEFEPIME SCH MLS/HR: 2 INJECTION, POWDER, FOR SOLUTION INTRAVENOUS at 01:06

## 2019-05-17 RX ADMIN — OXYCODONE HYDROCHLORIDE AND ACETAMINOPHEN PRN TAB: 7.5; 325 TABLET ORAL at 18:29

## 2019-05-18 VITALS — SYSTOLIC BLOOD PRESSURE: 101 MMHG | DIASTOLIC BLOOD PRESSURE: 68 MMHG

## 2019-05-18 VITALS — SYSTOLIC BLOOD PRESSURE: 100 MMHG | DIASTOLIC BLOOD PRESSURE: 66 MMHG

## 2019-05-18 VITALS — DIASTOLIC BLOOD PRESSURE: 76 MMHG | SYSTOLIC BLOOD PRESSURE: 113 MMHG

## 2019-05-18 VITALS — SYSTOLIC BLOOD PRESSURE: 101 MMHG | DIASTOLIC BLOOD PRESSURE: 66 MMHG

## 2019-05-18 LAB
<PLATELET ESTIMATE>: (no result)
<PLT MORPHOLOGY>: (no result)
ALBUMIN SERPL-MCNC: 2.6 G/DL (ref 3.4–5)
ALP SERPL-CCNC: 110 U/L (ref 45–117)
ALT SERPL-CCNC: 12 U/L (ref 12–78)
ANION GAP SERPL CALC-SCNC: 8 MMOL/L (ref 5–15)
ANISOCYTOSIS BLD QL SMEAR: (no result)
BASOPHILS NFR BLD MANUAL: 1 % (ref 0–1)
BASOS#(MANUAL): 0 X10^3/UL (ref 0–0.1)
BILIRUB SERPL-MCNC: 1 MG/DL (ref 0.2–1)
CALCIUM SERPL-MCNC: 8.7 MG/DL (ref 8.5–10.1)
CHLORIDE SERPL-SCNC: 104 MMOL/L (ref 98–107)
CREAT SERPL-MCNC: 0.52 MG/DL (ref 0.55–1.02)
ERYTHROCYTE [DISTWIDTH] IN BLOOD BY AUTOMATED COUNT: 14.7 % (ref 9.6–15.2)
LYMPH#(MANUAL): 0.2 X10^3/UL (ref 1–3.4)
LYMPHS% (MANUAL): 99 % (ref 22–44)
MCH RBC QN AUTO: 32.1 PG (ref 27–34.8)
MCHC RBC AUTO-ENTMCNC: 35.1 G/DL (ref 32.4–35.8)
MCV RBC AUTO: 91.3 FL (ref 80–100)
MD: YES
PLATELET # BLD AUTO: 24 X10^3/UL (ref 130–400)
PMV BLD AUTO: 7.6 FL (ref 7.4–10.4)
PROT SERPL-MCNC: 6.1 G/DL (ref 6.4–8.2)
RBC # BLD AUTO: 2.31 X10^6/UL (ref 3.82–5.3)

## 2019-05-18 RX ADMIN — TEMAZEPAM PRN MG: 15 CAPSULE ORAL at 21:24

## 2019-05-18 RX ADMIN — CEFEPIME SCH MLS/HR: 2 INJECTION, POWDER, FOR SOLUTION INTRAVENOUS at 17:23

## 2019-05-18 RX ADMIN — ALLOPURINOL SCH MG: 300 TABLET ORAL at 17:23

## 2019-05-18 RX ADMIN — OXYCODONE HYDROCHLORIDE AND ACETAMINOPHEN PRN TAB: 7.5; 325 TABLET ORAL at 04:00

## 2019-05-18 RX ADMIN — DIPHENHYDRAMINE HYDROCHLORIDE PRN MG: 25 CAPSULE ORAL at 09:01

## 2019-05-18 RX ADMIN — VORICONAZOLE SCH MG: 200 TABLET, FILM COATED ORAL at 08:55

## 2019-05-18 RX ADMIN — OXYCODONE HYDROCHLORIDE AND ACETAMINOPHEN PRN TAB: 7.5; 325 TABLET ORAL at 21:23

## 2019-05-18 RX ADMIN — ONDANSETRON PRN MG: 2 INJECTION, SOLUTION INTRAMUSCULAR; INTRAVENOUS at 04:00

## 2019-05-18 RX ADMIN — CEFEPIME SCH MLS/HR: 2 INJECTION, POWDER, FOR SOLUTION INTRAVENOUS at 00:09

## 2019-05-18 RX ADMIN — SODIUM CHLORIDE SCH MLS/HR: 0.9 INJECTION, SOLUTION INTRAVENOUS at 08:57

## 2019-05-18 RX ADMIN — ONDANSETRON PRN MG: 2 INJECTION, SOLUTION INTRAMUSCULAR; INTRAVENOUS at 16:22

## 2019-05-18 RX ADMIN — CEFEPIME SCH MLS/HR: 2 INJECTION, POWDER, FOR SOLUTION INTRAVENOUS at 08:56

## 2019-05-18 RX ADMIN — VORICONAZOLE SCH MG: 50 TABLET ORAL at 21:23

## 2019-05-18 RX ADMIN — SODIUM CHLORIDE SCH MLS/HR: 0.9 INJECTION, SOLUTION INTRAVENOUS at 20:48

## 2019-05-18 RX ADMIN — PANTOPRAZOLE SODIUM SCH MG: 40 TABLET, DELAYED RELEASE ORAL at 05:56

## 2019-05-18 RX ADMIN — MENTHOL, METHYL SALICYLATE SCH APPLIC: 10; 15 CREAM TOPICAL at 20:48

## 2019-05-18 RX ADMIN — PROCHLORPERAZINE EDISYLATE PRN MG: 5 INJECTION INTRAMUSCULAR; INTRAVENOUS at 20:48

## 2019-05-18 RX ADMIN — PROCHLORPERAZINE EDISYLATE PRN MG: 5 INJECTION INTRAMUSCULAR; INTRAVENOUS at 05:55

## 2019-05-18 RX ADMIN — MENTHOL, METHYL SALICYLATE SCH APPLIC: 10; 15 CREAM TOPICAL at 15:08

## 2019-05-18 RX ADMIN — MENTHOL, METHYL SALICYLATE SCH APPLIC: 10; 15 CREAM TOPICAL at 08:57

## 2019-05-19 VITALS — DIASTOLIC BLOOD PRESSURE: 75 MMHG | SYSTOLIC BLOOD PRESSURE: 136 MMHG

## 2019-05-19 VITALS — DIASTOLIC BLOOD PRESSURE: 65 MMHG | SYSTOLIC BLOOD PRESSURE: 101 MMHG

## 2019-05-19 VITALS — DIASTOLIC BLOOD PRESSURE: 82 MMHG | SYSTOLIC BLOOD PRESSURE: 130 MMHG

## 2019-05-19 VITALS — SYSTOLIC BLOOD PRESSURE: 126 MMHG | DIASTOLIC BLOOD PRESSURE: 75 MMHG

## 2019-05-19 VITALS — DIASTOLIC BLOOD PRESSURE: 84 MMHG | SYSTOLIC BLOOD PRESSURE: 124 MMHG

## 2019-05-19 VITALS — SYSTOLIC BLOOD PRESSURE: 142 MMHG | DIASTOLIC BLOOD PRESSURE: 85 MMHG

## 2019-05-19 VITALS — DIASTOLIC BLOOD PRESSURE: 83 MMHG | SYSTOLIC BLOOD PRESSURE: 130 MMHG

## 2019-05-19 LAB
<PLATELET ESTIMATE>: (no result)
<PLT MORPHOLOGY>: (no result)
ALBUMIN SERPL-MCNC: 2.4 G/DL (ref 3.4–5)
ALP SERPL-CCNC: 99 U/L (ref 45–117)
ALT SERPL-CCNC: 7 U/L (ref 12–78)
ANION GAP SERPL CALC-SCNC: 5 MMOL/L (ref 5–15)
ANISOCYTOSIS BLD QL SMEAR: (no result)
BILIRUB SERPL-MCNC: 0.8 MG/DL (ref 0.2–1)
CALCIUM SERPL-MCNC: 8.5 MG/DL (ref 8.5–10.1)
CHLORIDE SERPL-SCNC: 109 MMOL/L (ref 98–107)
CREAT SERPL-MCNC: 0.55 MG/DL (ref 0.55–1.02)
ERYTHROCYTE [DISTWIDTH] IN BLOOD BY AUTOMATED COUNT: 14.3 % (ref 9.6–15.2)
LYMPH#(MANUAL): 0.2 X10^3/UL (ref 1–3.4)
LYMPHS% (MANUAL): 100 % (ref 22–44)
MCH RBC QN AUTO: 31.6 PG (ref 27–34.8)
MCHC RBC AUTO-ENTMCNC: 34.4 G/DL (ref 32.4–35.8)
MCV RBC AUTO: 91.7 FL (ref 80–100)
MD: YES
PLATELET # BLD AUTO: 16 X10^3/UL (ref 130–400)
PMV BLD AUTO: 7.9 FL (ref 7.4–10.4)
PROT SERPL-MCNC: 5.8 G/DL (ref 6.4–8.2)
RBC # BLD AUTO: 1.98 X10^6/UL (ref 3.82–5.3)
SEG#(MANUAL): 0 X10^3/UL (ref 1.8–6.8)
SEGS% (MANUAL): 0 % (ref 42–75)

## 2019-05-19 RX ADMIN — MENTHOL, METHYL SALICYLATE SCH APPLIC: 10; 15 CREAM TOPICAL at 17:03

## 2019-05-19 RX ADMIN — SODIUM CHLORIDE SCH MLS/HR: 0.9 INJECTION, SOLUTION INTRAVENOUS at 07:28

## 2019-05-19 RX ADMIN — VORICONAZOLE SCH MG: 50 TABLET ORAL at 08:42

## 2019-05-19 RX ADMIN — CEFEPIME SCH MLS/HR: 2 INJECTION, POWDER, FOR SOLUTION INTRAVENOUS at 17:03

## 2019-05-19 RX ADMIN — CEFEPIME SCH MLS/HR: 2 INJECTION, POWDER, FOR SOLUTION INTRAVENOUS at 01:38

## 2019-05-19 RX ADMIN — MAGNESIUM OXIDE SCH MG: 400 TABLET ORAL at 21:16

## 2019-05-19 RX ADMIN — DIPHENHYDRAMINE HYDROCHLORIDE PRN MG: 25 CAPSULE ORAL at 08:42

## 2019-05-19 RX ADMIN — MENTHOL, METHYL SALICYLATE SCH APPLIC: 10; 15 CREAM TOPICAL at 08:43

## 2019-05-19 RX ADMIN — MENTHOL, METHYL SALICYLATE SCH APPLIC: 10; 15 CREAM TOPICAL at 21:17

## 2019-05-19 RX ADMIN — MAGNESIUM OXIDE SCH MG: 400 TABLET ORAL at 08:41

## 2019-05-19 RX ADMIN — ALLOPURINOL SCH MG: 300 TABLET ORAL at 17:03

## 2019-05-19 RX ADMIN — ACETAMINOPHEN PRN MG: 325 TABLET, FILM COATED ORAL at 08:42

## 2019-05-19 RX ADMIN — ONDANSETRON PRN MG: 2 INJECTION, SOLUTION INTRAMUSCULAR; INTRAVENOUS at 14:36

## 2019-05-19 RX ADMIN — OXYCODONE HYDROCHLORIDE AND ACETAMINOPHEN PRN TAB: 7.5; 325 TABLET ORAL at 21:25

## 2019-05-19 RX ADMIN — PROCHLORPERAZINE EDISYLATE PRN MG: 5 INJECTION INTRAMUSCULAR; INTRAVENOUS at 21:25

## 2019-05-19 RX ADMIN — PANTOPRAZOLE SODIUM SCH MG: 40 TABLET, DELAYED RELEASE ORAL at 05:12

## 2019-05-19 RX ADMIN — VORICONAZOLE SCH MG: 50 TABLET ORAL at 21:16

## 2019-05-19 RX ADMIN — SODIUM CHLORIDE SCH MLS/HR: 0.9 INJECTION, SOLUTION INTRAVENOUS at 17:03

## 2019-05-19 RX ADMIN — CEFEPIME SCH MLS/HR: 2 INJECTION, POWDER, FOR SOLUTION INTRAVENOUS at 08:41

## 2019-05-20 VITALS — SYSTOLIC BLOOD PRESSURE: 100 MMHG | DIASTOLIC BLOOD PRESSURE: 65 MMHG

## 2019-05-20 VITALS — SYSTOLIC BLOOD PRESSURE: 150 MMHG | DIASTOLIC BLOOD PRESSURE: 88 MMHG

## 2019-05-20 VITALS — DIASTOLIC BLOOD PRESSURE: 81 MMHG | SYSTOLIC BLOOD PRESSURE: 122 MMHG

## 2019-05-20 VITALS — DIASTOLIC BLOOD PRESSURE: 82 MMHG | SYSTOLIC BLOOD PRESSURE: 153 MMHG

## 2019-05-20 LAB
<PLATELET ESTIMATE>: (no result)
<PLT MORPHOLOGY>: (no result)
ALBUMIN SERPL-MCNC: 2.4 G/DL (ref 3.4–5)
ALP SERPL-CCNC: 93 U/L (ref 45–117)
ALT SERPL-CCNC: 11 U/L (ref 12–78)
ANION GAP SERPL CALC-SCNC: 6 MMOL/L (ref 5–15)
BILIRUB DIRECT SERPL-MCNC: NORMAL MG/DL
BILIRUB SERPL-MCNC: 0.6 MG/DL (ref 0.2–1)
CALCIUM SERPL-MCNC: 8.9 MG/DL (ref 8.5–10.1)
CHLORIDE SERPL-SCNC: 108 MMOL/L (ref 98–107)
CK SERPL-CCNC: 13 U/L (ref 26–192)
CREAT SERPL-MCNC: 0.48 MG/DL (ref 0.55–1.02)
CRP SERPL-MCNC: 11 MG/DL (ref 0.02–0.49)
ERYTHROCYTE [DISTWIDTH] IN BLOOD BY AUTOMATED COUNT: 13.9 % (ref 9.6–15.2)
ERYTHROCYTE [SEDIMENTATION RATE] IN BLOOD BY WESTERGREN METHOD: 114 MM/HR (ref 0–20)
HCT (SEDRATE): 21.9 % (ref 34.6–47.8)
LYMPH#(MANUAL): 0.3 X10^3/UL (ref 1–3.4)
LYMPHS% (MANUAL): 100 % (ref 22–44)
MCH RBC QN AUTO: 31.3 PG (ref 27–34.8)
MCHC RBC AUTO-ENTMCNC: 33.7 G/DL (ref 32.4–35.8)
MCV RBC AUTO: 93.1 FL (ref 80–100)
MD: YES
PLATELET # BLD AUTO: 10 X10^3/UL (ref 130–400)
PMV BLD AUTO: 7.5 FL (ref 7.4–10.4)
PROT SERPL-MCNC: 5.5 G/DL (ref 6.4–8.2)
RBC # BLD AUTO: 2.36 X10^6/UL (ref 3.82–5.3)

## 2019-05-20 RX ADMIN — CEFEPIME SCH MLS/HR: 2 INJECTION, POWDER, FOR SOLUTION INTRAVENOUS at 09:20

## 2019-05-20 RX ADMIN — PANTOPRAZOLE SODIUM SCH MG: 40 TABLET, DELAYED RELEASE ORAL at 05:09

## 2019-05-20 RX ADMIN — VORICONAZOLE SCH MG: 50 TABLET ORAL at 21:48

## 2019-05-20 RX ADMIN — METHYLPREDNISOLONE SODIUM SUCCINATE SCH MG: 40 INJECTION, POWDER, FOR SOLUTION INTRAMUSCULAR; INTRAVENOUS at 19:54

## 2019-05-20 RX ADMIN — ALLOPURINOL SCH MG: 300 TABLET ORAL at 18:21

## 2019-05-20 RX ADMIN — MAGNESIUM OXIDE SCH MG: 400 TABLET ORAL at 09:20

## 2019-05-20 RX ADMIN — SODIUM CHLORIDE SCH MLS/HR: 0.9 INJECTION, SOLUTION INTRAVENOUS at 05:09

## 2019-05-20 RX ADMIN — MENTHOL, METHYL SALICYLATE SCH APPLIC: 10; 15 CREAM TOPICAL at 09:21

## 2019-05-20 RX ADMIN — MENTHOL, METHYL SALICYLATE SCH APPLIC: 10; 15 CREAM TOPICAL at 16:21

## 2019-05-20 RX ADMIN — CEFEPIME SCH MLS/HR: 2 INJECTION, POWDER, FOR SOLUTION INTRAVENOUS at 01:39

## 2019-05-20 RX ADMIN — OXYCODONE HYDROCHLORIDE AND ACETAMINOPHEN PRN TAB: 7.5; 325 TABLET ORAL at 18:21

## 2019-05-20 RX ADMIN — ONDANSETRON PRN MG: 2 INJECTION, SOLUTION INTRAMUSCULAR; INTRAVENOUS at 21:44

## 2019-05-20 RX ADMIN — Medication PRN ML: at 09:20

## 2019-05-20 RX ADMIN — MENTHOL, METHYL SALICYLATE SCH APPLIC: 10; 15 CREAM TOPICAL at 21:49

## 2019-05-20 RX ADMIN — VORICONAZOLE SCH MG: 50 TABLET ORAL at 09:20

## 2019-05-20 RX ADMIN — CEFEPIME SCH MLS/HR: 2 INJECTION, POWDER, FOR SOLUTION INTRAVENOUS at 18:21

## 2019-05-20 RX ADMIN — SODIUM CHLORIDE SCH MLS/HR: 0.9 INJECTION, SOLUTION INTRAVENOUS at 14:33

## 2019-05-20 RX ADMIN — MAGNESIUM OXIDE SCH MG: 400 TABLET ORAL at 21:49

## 2019-05-21 VITALS — DIASTOLIC BLOOD PRESSURE: 79 MMHG | SYSTOLIC BLOOD PRESSURE: 145 MMHG

## 2019-05-21 VITALS — SYSTOLIC BLOOD PRESSURE: 149 MMHG | DIASTOLIC BLOOD PRESSURE: 64 MMHG

## 2019-05-21 VITALS — DIASTOLIC BLOOD PRESSURE: 75 MMHG | SYSTOLIC BLOOD PRESSURE: 133 MMHG

## 2019-05-21 VITALS — DIASTOLIC BLOOD PRESSURE: 86 MMHG | SYSTOLIC BLOOD PRESSURE: 137 MMHG

## 2019-05-21 VITALS — SYSTOLIC BLOOD PRESSURE: 140 MMHG | DIASTOLIC BLOOD PRESSURE: 78 MMHG

## 2019-05-21 VITALS — SYSTOLIC BLOOD PRESSURE: 145 MMHG | DIASTOLIC BLOOD PRESSURE: 81 MMHG

## 2019-05-21 VITALS — SYSTOLIC BLOOD PRESSURE: 144 MMHG | DIASTOLIC BLOOD PRESSURE: 71 MMHG

## 2019-05-21 LAB
ALBUMIN SERPL-MCNC: 2.6 G/DL (ref 3.4–5)
ALP SERPL-CCNC: 104 U/L (ref 45–117)
ALT SERPL-CCNC: 10 U/L (ref 12–78)
ANION GAP SERPL CALC-SCNC: 9 MMOL/L (ref 5–15)
BILIRUB SERPL-MCNC: 0.8 MG/DL (ref 0.2–1)
CALCIUM SERPL-MCNC: 9 MG/DL (ref 8.5–10.1)
CHLORIDE SERPL-SCNC: 104 MMOL/L (ref 98–107)
CREAT SERPL-MCNC: 0.56 MG/DL (ref 0.55–1.02)
ERYTHROCYTE [DISTWIDTH] IN BLOOD BY AUTOMATED COUNT: 13.7 % (ref 9.6–15.2)
MCH RBC QN AUTO: 30.4 PG (ref 27–34.8)
MCHC RBC AUTO-ENTMCNC: 33.3 G/DL (ref 32.4–35.8)
MCV RBC AUTO: 91.3 FL (ref 80–100)
PLATELET # BLD AUTO: 7 X10^3/UL (ref 130–400)
PMV BLD AUTO: 8.4 FL (ref 7.4–10.4)
PROT SERPL-MCNC: 6.4 G/DL (ref 6.4–8.2)
RBC # BLD AUTO: 2.56 X10^6/UL (ref 3.82–5.3)

## 2019-05-21 RX ADMIN — METHYLPREDNISOLONE SODIUM SUCCINATE SCH MG: 40 INJECTION, POWDER, FOR SOLUTION INTRAMUSCULAR; INTRAVENOUS at 08:36

## 2019-05-21 RX ADMIN — CEFEPIME SCH MLS/HR: 2 INJECTION, POWDER, FOR SOLUTION INTRAVENOUS at 08:35

## 2019-05-21 RX ADMIN — DIPHENHYDRAMINE HYDROCHLORIDE PRN MG: 25 CAPSULE ORAL at 08:51

## 2019-05-21 RX ADMIN — VORICONAZOLE SCH MG: 50 TABLET ORAL at 20:52

## 2019-05-21 RX ADMIN — ONDANSETRON PRN MG: 2 INJECTION, SOLUTION INTRAMUSCULAR; INTRAVENOUS at 20:53

## 2019-05-21 RX ADMIN — SODIUM CHLORIDE SCH MLS/HR: 0.9 INJECTION, SOLUTION INTRAVENOUS at 20:51

## 2019-05-21 RX ADMIN — SODIUM CHLORIDE SCH MLS/HR: 0.9 INJECTION, SOLUTION INTRAVENOUS at 08:35

## 2019-05-21 RX ADMIN — VORICONAZOLE SCH MG: 50 TABLET ORAL at 08:36

## 2019-05-21 RX ADMIN — MENTHOL, METHYL SALICYLATE SCH APPLIC: 10; 15 CREAM TOPICAL at 20:53

## 2019-05-21 RX ADMIN — PANTOPRAZOLE SODIUM SCH MG: 40 TABLET, DELAYED RELEASE ORAL at 05:11

## 2019-05-21 RX ADMIN — CEFEPIME SCH MLS/HR: 2 INJECTION, POWDER, FOR SOLUTION INTRAVENOUS at 01:28

## 2019-05-21 RX ADMIN — TEMAZEPAM PRN MG: 15 CAPSULE ORAL at 20:56

## 2019-05-21 RX ADMIN — OXYCODONE HYDROCHLORIDE AND ACETAMINOPHEN PRN TAB: 7.5; 325 TABLET ORAL at 20:52

## 2019-05-21 RX ADMIN — MENTHOL, METHYL SALICYLATE SCH APPLIC: 10; 15 CREAM TOPICAL at 08:37

## 2019-05-21 RX ADMIN — CEFEPIME SCH MLS/HR: 2 INJECTION, POWDER, FOR SOLUTION INTRAVENOUS at 16:14

## 2019-05-21 RX ADMIN — MAGNESIUM OXIDE SCH MG: 400 TABLET ORAL at 08:36

## 2019-05-21 RX ADMIN — MAGNESIUM OXIDE SCH MG: 400 TABLET ORAL at 20:52

## 2019-05-21 RX ADMIN — Medication PRN ML: at 20:51

## 2019-05-21 RX ADMIN — MENTHOL, METHYL SALICYLATE SCH APPLIC: 10; 15 CREAM TOPICAL at 16:14

## 2019-05-21 RX ADMIN — ALLOPURINOL SCH MG: 300 TABLET ORAL at 16:15

## 2019-05-22 VITALS — SYSTOLIC BLOOD PRESSURE: 157 MMHG | DIASTOLIC BLOOD PRESSURE: 76 MMHG

## 2019-05-22 VITALS — SYSTOLIC BLOOD PRESSURE: 150 MMHG | DIASTOLIC BLOOD PRESSURE: 84 MMHG

## 2019-05-22 VITALS — SYSTOLIC BLOOD PRESSURE: 153 MMHG | DIASTOLIC BLOOD PRESSURE: 79 MMHG

## 2019-05-22 VITALS — SYSTOLIC BLOOD PRESSURE: 145 MMHG | DIASTOLIC BLOOD PRESSURE: 88 MMHG

## 2019-05-22 LAB
<PLATELET ESTIMATE>: (no result)
<PLT MORPHOLOGY>: (no result)
ALBUMIN SERPL-MCNC: 2.6 G/DL (ref 3.4–5)
ALP SERPL-CCNC: 92 U/L (ref 45–117)
ALT SERPL-CCNC: 15 U/L (ref 12–78)
ANION GAP SERPL CALC-SCNC: 7 MMOL/L (ref 5–15)
BILIRUB DIRECT SERPL-MCNC: NORMAL MG/DL
BILIRUB SERPL-MCNC: 0.2 MG/DL (ref 0.2–1)
CALCIUM SERPL-MCNC: 9.3 MG/DL (ref 8.5–10.1)
CHLORIDE SERPL-SCNC: 109 MMOL/L (ref 98–107)
CREAT SERPL-MCNC: 0.59 MG/DL (ref 0.55–1.02)
ERYTHROCYTE [DISTWIDTH] IN BLOOD BY AUTOMATED COUNT: 13.6 % (ref 9.6–15.2)
LYMPH#(MANUAL): 0.2 X10^3/UL (ref 1–3.4)
LYMPHS% (MANUAL): 100 % (ref 22–44)
MCH RBC QN AUTO: 31.1 PG (ref 27–34.8)
MCHC RBC AUTO-ENTMCNC: 33.6 G/DL (ref 32.4–35.8)
MCV RBC AUTO: 92.5 FL (ref 80–100)
MD: YES
PLATELET # BLD AUTO: 40 X10^3/UL (ref 130–400)
PMV BLD AUTO: 7.3 FL (ref 7.4–10.4)
PROT SERPL-MCNC: 6.3 G/DL (ref 6.4–8.2)
RBC # BLD AUTO: 2.65 X10^6/UL (ref 3.82–5.3)
SEG#(MANUAL): 0 X10^3/UL (ref 1.8–6.8)
SEGS% (MANUAL): 0 % (ref 42–75)

## 2019-05-22 RX ADMIN — MAGNESIUM OXIDE SCH MG: 400 TABLET ORAL at 08:37

## 2019-05-22 RX ADMIN — TEMAZEPAM PRN MG: 15 CAPSULE ORAL at 20:05

## 2019-05-22 RX ADMIN — MENTHOL, METHYL SALICYLATE SCH APPLIC: 10; 15 CREAM TOPICAL at 21:32

## 2019-05-22 RX ADMIN — MENTHOL, METHYL SALICYLATE SCH APPLIC: 10; 15 CREAM TOPICAL at 08:38

## 2019-05-22 RX ADMIN — MAGNESIUM OXIDE SCH MG: 400 TABLET ORAL at 21:32

## 2019-05-22 RX ADMIN — VORICONAZOLE SCH MG: 50 TABLET ORAL at 21:32

## 2019-05-22 RX ADMIN — POLYETHYLENE GLYCOL 3350 PRN GM: 17 POWDER, FOR SOLUTION ORAL at 20:05

## 2019-05-22 RX ADMIN — VORICONAZOLE SCH MG: 50 TABLET ORAL at 08:37

## 2019-05-22 RX ADMIN — CEFEPIME SCH MLS/HR: 2 INJECTION, POWDER, FOR SOLUTION INTRAVENOUS at 10:25

## 2019-05-22 RX ADMIN — Medication PRN ML: at 05:52

## 2019-05-22 RX ADMIN — PANTOPRAZOLE SODIUM SCH MG: 40 TABLET, DELAYED RELEASE ORAL at 05:51

## 2019-05-22 RX ADMIN — ONDANSETRON PRN MG: 2 INJECTION, SOLUTION INTRAMUSCULAR; INTRAVENOUS at 20:05

## 2019-05-22 RX ADMIN — CEFEPIME SCH MLS/HR: 2 INJECTION, POWDER, FOR SOLUTION INTRAVENOUS at 02:31

## 2019-05-22 RX ADMIN — SODIUM CHLORIDE SCH MLS/HR: 0.9 INJECTION, SOLUTION INTRAVENOUS at 08:36

## 2019-05-22 RX ADMIN — ALLOPURINOL SCH MG: 300 TABLET ORAL at 18:25

## 2019-05-22 RX ADMIN — CEFEPIME SCH MLS/HR: 2 INJECTION, POWDER, FOR SOLUTION INTRAVENOUS at 19:45

## 2019-05-22 RX ADMIN — MENTHOL, METHYL SALICYLATE SCH APPLIC: 10; 15 CREAM TOPICAL at 16:22

## 2019-05-23 VITALS — DIASTOLIC BLOOD PRESSURE: 81 MMHG | SYSTOLIC BLOOD PRESSURE: 131 MMHG

## 2019-05-23 VITALS — SYSTOLIC BLOOD PRESSURE: 113 MMHG | DIASTOLIC BLOOD PRESSURE: 70 MMHG

## 2019-05-23 VITALS — DIASTOLIC BLOOD PRESSURE: 74 MMHG | SYSTOLIC BLOOD PRESSURE: 124 MMHG

## 2019-05-23 VITALS — DIASTOLIC BLOOD PRESSURE: 72 MMHG | SYSTOLIC BLOOD PRESSURE: 134 MMHG

## 2019-05-23 VITALS — DIASTOLIC BLOOD PRESSURE: 72 MMHG | SYSTOLIC BLOOD PRESSURE: 123 MMHG

## 2019-05-23 VITALS — DIASTOLIC BLOOD PRESSURE: 77 MMHG | SYSTOLIC BLOOD PRESSURE: 128 MMHG

## 2019-05-23 VITALS — DIASTOLIC BLOOD PRESSURE: 74 MMHG | SYSTOLIC BLOOD PRESSURE: 136 MMHG

## 2019-05-23 LAB
ALBUMIN SERPL-MCNC: 2.5 G/DL (ref 3.4–5)
ALP SERPL-CCNC: 86 U/L (ref 45–117)
ALT SERPL-CCNC: 13 U/L (ref 12–78)
ANION GAP SERPL CALC-SCNC: 6 MMOL/L (ref 5–15)
BILIRUB SERPL-MCNC: 0.3 MG/DL (ref 0.2–1)
CALCIUM SERPL-MCNC: 8.4 MG/DL (ref 8.5–10.1)
CHLORIDE SERPL-SCNC: 107 MMOL/L (ref 98–107)
CREAT SERPL-MCNC: 0.57 MG/DL (ref 0.55–1.02)
ERYTHROCYTE [DISTWIDTH] IN BLOOD BY AUTOMATED COUNT: 13.7 % (ref 9.6–15.2)
MCH RBC QN AUTO: 30.8 PG (ref 27–34.8)
MCHC RBC AUTO-ENTMCNC: 33.3 G/DL (ref 32.4–35.8)
MCV RBC AUTO: 92.4 FL (ref 80–100)
PLATELET # BLD AUTO: 29 X10^3/UL (ref 130–400)
PMV BLD AUTO: 7 FL (ref 7.4–10.4)
PROT SERPL-MCNC: 5.8 G/DL (ref 6.4–8.2)
RBC # BLD AUTO: 2.16 X10^6/UL (ref 3.82–5.3)

## 2019-05-23 RX ADMIN — CEFEPIME SCH MLS/HR: 2 INJECTION, POWDER, FOR SOLUTION INTRAVENOUS at 04:19

## 2019-05-23 RX ADMIN — PANTOPRAZOLE SODIUM SCH MG: 40 TABLET, DELAYED RELEASE ORAL at 05:53

## 2019-05-23 RX ADMIN — ONDANSETRON PRN MG: 2 INJECTION, SOLUTION INTRAMUSCULAR; INTRAVENOUS at 08:15

## 2019-05-23 RX ADMIN — CEFEPIME SCH MLS/HR: 2 INJECTION, POWDER, FOR SOLUTION INTRAVENOUS at 20:42

## 2019-05-23 RX ADMIN — DIPHENHYDRAMINE HYDROCHLORIDE PRN MG: 25 CAPSULE ORAL at 08:17

## 2019-05-23 RX ADMIN — VORICONAZOLE SCH MG: 50 TABLET ORAL at 20:43

## 2019-05-23 RX ADMIN — ONDANSETRON PRN MG: 2 INJECTION, SOLUTION INTRAMUSCULAR; INTRAVENOUS at 17:09

## 2019-05-23 RX ADMIN — Medication PRN ML: at 05:49

## 2019-05-23 RX ADMIN — MENTHOL, METHYL SALICYLATE SCH APPLIC: 10; 15 CREAM TOPICAL at 15:43

## 2019-05-23 RX ADMIN — ONDANSETRON PRN MG: 2 INJECTION, SOLUTION INTRAMUSCULAR; INTRAVENOUS at 20:52

## 2019-05-23 RX ADMIN — ACETAMINOPHEN PRN MG: 325 TABLET, FILM COATED ORAL at 08:17

## 2019-05-23 RX ADMIN — ALLOPURINOL SCH MG: 300 TABLET ORAL at 17:09

## 2019-05-23 RX ADMIN — CEFEPIME SCH MLS/HR: 2 INJECTION, POWDER, FOR SOLUTION INTRAVENOUS at 11:56

## 2019-05-23 RX ADMIN — MAGNESIUM OXIDE SCH MG: 400 TABLET ORAL at 20:43

## 2019-05-23 RX ADMIN — MAGNESIUM OXIDE SCH MG: 400 TABLET ORAL at 08:17

## 2019-05-23 RX ADMIN — MENTHOL, METHYL SALICYLATE SCH APPLIC: 10; 15 CREAM TOPICAL at 20:43

## 2019-05-23 RX ADMIN — TEMAZEPAM PRN MG: 15 CAPSULE ORAL at 20:43

## 2019-05-23 RX ADMIN — OXYCODONE HYDROCHLORIDE AND ACETAMINOPHEN PRN TAB: 7.5; 325 TABLET ORAL at 17:09

## 2019-05-23 RX ADMIN — VORICONAZOLE SCH MG: 50 TABLET ORAL at 08:17

## 2019-05-23 RX ADMIN — MENTHOL, METHYL SALICYLATE SCH APPLIC: 10; 15 CREAM TOPICAL at 08:17

## 2019-05-23 RX ADMIN — Medication PRN ML: at 15:42

## 2019-05-24 VITALS — SYSTOLIC BLOOD PRESSURE: 117 MMHG | DIASTOLIC BLOOD PRESSURE: 80 MMHG

## 2019-05-24 VITALS — SYSTOLIC BLOOD PRESSURE: 116 MMHG | DIASTOLIC BLOOD PRESSURE: 78 MMHG

## 2019-05-24 VITALS — SYSTOLIC BLOOD PRESSURE: 121 MMHG | DIASTOLIC BLOOD PRESSURE: 79 MMHG

## 2019-05-24 VITALS — SYSTOLIC BLOOD PRESSURE: 114 MMHG | DIASTOLIC BLOOD PRESSURE: 74 MMHG

## 2019-05-24 LAB
<PLATELET ESTIMATE>: (no result)
<PLT MORPHOLOGY>: (no result)
ALBUMIN SERPL-MCNC: 2.7 G/DL (ref 3.4–5)
ALP SERPL-CCNC: 95 U/L (ref 45–117)
ALT SERPL-CCNC: 11 U/L (ref 12–78)
ANION GAP SERPL CALC-SCNC: 6 MMOL/L (ref 5–15)
BILIRUB DIRECT SERPL-MCNC: NORMAL MG/DL
BILIRUB SERPL-MCNC: 0.4 MG/DL (ref 0.2–1)
CALCIUM SERPL-MCNC: 9 MG/DL (ref 8.5–10.1)
CHLORIDE SERPL-SCNC: 102 MMOL/L (ref 98–107)
CREAT SERPL-MCNC: 0.61 MG/DL (ref 0.55–1.02)
ERYTHROCYTE [DISTWIDTH] IN BLOOD BY AUTOMATED COUNT: 13.4 % (ref 9.6–15.2)
LYMPH#(MANUAL): 0.19 X10^3/UL (ref 1–3.4)
LYMPHS% (MANUAL): 96 % (ref 22–44)
MCH RBC QN AUTO: 31.2 PG (ref 27–34.8)
MCHC RBC AUTO-ENTMCNC: 34.2 G/DL (ref 32.4–35.8)
MCV RBC AUTO: 91.3 FL (ref 80–100)
MD: YES
PLATELET # BLD AUTO: 22 X10^3/UL (ref 130–400)
PMV BLD AUTO: 7.7 FL (ref 7.4–10.4)
PROT SERPL-MCNC: 6.2 G/DL (ref 6.4–8.2)
RBC # BLD AUTO: 2.61 X10^6/UL (ref 3.82–5.3)
SEG#(MANUAL): 0.01 X10^3/UL (ref 1.8–6.8)
SEGS% (MANUAL): 4 % (ref 42–75)

## 2019-05-24 PROCEDURE — 07DR3ZX EXTRACTION OF ILIAC BONE MARROW, PERCUTANEOUS APPROACH, DIAGNOSTIC: ICD-10-PCS | Performed by: RADIOLOGY

## 2019-05-24 RX ADMIN — MAGNESIUM OXIDE SCH MG: 400 TABLET ORAL at 20:00

## 2019-05-24 RX ADMIN — MENTHOL, METHYL SALICYLATE SCH APPLIC: 10; 15 CREAM TOPICAL at 07:52

## 2019-05-24 RX ADMIN — VORICONAZOLE SCH MG: 50 TABLET ORAL at 19:59

## 2019-05-24 RX ADMIN — CEFEPIME SCH MLS/HR: 2 INJECTION, POWDER, FOR SOLUTION INTRAVENOUS at 03:39

## 2019-05-24 RX ADMIN — ALLOPURINOL SCH MG: 300 TABLET ORAL at 15:49

## 2019-05-24 RX ADMIN — CEFEPIME SCH MLS/HR: 2 INJECTION, POWDER, FOR SOLUTION INTRAVENOUS at 11:27

## 2019-05-24 RX ADMIN — PANTOPRAZOLE SODIUM SCH MG: 40 TABLET, DELAYED RELEASE ORAL at 07:51

## 2019-05-24 RX ADMIN — ONDANSETRON PRN MG: 2 INJECTION, SOLUTION INTRAMUSCULAR; INTRAVENOUS at 07:52

## 2019-05-24 RX ADMIN — DIPHENHYDRAMINE HYDROCHLORIDE PRN MG: 25 CAPSULE ORAL at 22:50

## 2019-05-24 RX ADMIN — VORICONAZOLE SCH MG: 50 TABLET ORAL at 07:52

## 2019-05-24 RX ADMIN — Medication PRN ML: at 07:59

## 2019-05-24 RX ADMIN — CEFEPIME SCH MLS/HR: 2 INJECTION, POWDER, FOR SOLUTION INTRAVENOUS at 20:00

## 2019-05-24 RX ADMIN — ONDANSETRON PRN MG: 2 INJECTION, SOLUTION INTRAMUSCULAR; INTRAVENOUS at 19:59

## 2019-05-24 RX ADMIN — MENTHOL, METHYL SALICYLATE SCH APPLIC: 10; 15 CREAM TOPICAL at 20:00

## 2019-05-24 RX ADMIN — Medication PRN ML: at 02:10

## 2019-05-24 RX ADMIN — MENTHOL, METHYL SALICYLATE SCH APPLIC: 10; 15 CREAM TOPICAL at 15:49

## 2019-05-24 RX ADMIN — MAGNESIUM OXIDE SCH MG: 400 TABLET ORAL at 07:51

## 2019-05-25 VITALS — DIASTOLIC BLOOD PRESSURE: 72 MMHG | SYSTOLIC BLOOD PRESSURE: 118 MMHG

## 2019-05-25 VITALS — SYSTOLIC BLOOD PRESSURE: 112 MMHG | DIASTOLIC BLOOD PRESSURE: 73 MMHG

## 2019-05-25 VITALS — SYSTOLIC BLOOD PRESSURE: 128 MMHG | DIASTOLIC BLOOD PRESSURE: 80 MMHG

## 2019-05-25 VITALS — SYSTOLIC BLOOD PRESSURE: 119 MMHG | DIASTOLIC BLOOD PRESSURE: 68 MMHG

## 2019-05-25 VITALS — DIASTOLIC BLOOD PRESSURE: 70 MMHG | SYSTOLIC BLOOD PRESSURE: 108 MMHG

## 2019-05-25 VITALS — DIASTOLIC BLOOD PRESSURE: 82 MMHG | SYSTOLIC BLOOD PRESSURE: 123 MMHG

## 2019-05-25 VITALS — SYSTOLIC BLOOD PRESSURE: 114 MMHG | DIASTOLIC BLOOD PRESSURE: 72 MMHG

## 2019-05-25 LAB
<PLATELET ESTIMATE>: (no result)
<PLT MORPHOLOGY>: (no result)
ALBUMIN SERPL-MCNC: 2.8 G/DL (ref 3.4–5)
ALP SERPL-CCNC: 101 U/L (ref 45–117)
ALT SERPL-CCNC: 12 U/L (ref 12–78)
ANION GAP SERPL CALC-SCNC: 9 MMOL/L (ref 5–15)
BILIRUB DIRECT SERPL-MCNC: NORMAL MG/DL
BILIRUB SERPL-MCNC: 0.4 MG/DL (ref 0.2–1)
CALCIUM SERPL-MCNC: 9.2 MG/DL (ref 8.5–10.1)
CHLORIDE SERPL-SCNC: 100 MMOL/L (ref 98–107)
CREAT SERPL-MCNC: 0.65 MG/DL (ref 0.55–1.02)
ERYTHROCYTE [DISTWIDTH] IN BLOOD BY AUTOMATED COUNT: 13.4 % (ref 9.6–15.2)
LYMPH#(MANUAL): 0.2 X10^3/UL (ref 1–3.4)
LYMPHS% (MANUAL): 100 % (ref 22–44)
MCH RBC QN AUTO: 31.3 PG (ref 27–34.8)
MCHC RBC AUTO-ENTMCNC: 34.2 G/DL (ref 32.4–35.8)
MCV RBC AUTO: 91.3 FL (ref 80–100)
MD: YES
PLATELET # BLD AUTO: 13 X10^3/UL (ref 130–400)
PMV BLD AUTO: 7.7 FL (ref 7.4–10.4)
PROT SERPL-MCNC: 7 G/DL (ref 6.4–8.2)
RBC # BLD AUTO: 2.17 X10^6/UL (ref 3.82–5.3)
SEG#(MANUAL): 0 X10^3/UL (ref 1.8–6.8)
SEGS% (MANUAL): 0 % (ref 42–75)

## 2019-05-25 RX ADMIN — Medication PRN ML: at 12:32

## 2019-05-25 RX ADMIN — Medication PRN ML: at 17:51

## 2019-05-25 RX ADMIN — ONDANSETRON PRN MG: 2 INJECTION, SOLUTION INTRAMUSCULAR; INTRAVENOUS at 17:51

## 2019-05-25 RX ADMIN — DIPHENHYDRAMINE HYDROCHLORIDE PRN MG: 25 CAPSULE ORAL at 09:23

## 2019-05-25 RX ADMIN — PANTOPRAZOLE SODIUM SCH MG: 40 TABLET, DELAYED RELEASE ORAL at 05:58

## 2019-05-25 RX ADMIN — ACETAMINOPHEN PRN MG: 325 TABLET, FILM COATED ORAL at 09:23

## 2019-05-25 RX ADMIN — CEFEPIME SCH MLS/HR: 2 INJECTION, POWDER, FOR SOLUTION INTRAVENOUS at 19:26

## 2019-05-25 RX ADMIN — MENTHOL, METHYL SALICYLATE SCH APPLIC: 10; 15 CREAM TOPICAL at 16:00

## 2019-05-25 RX ADMIN — MAGNESIUM OXIDE SCH MG: 400 TABLET ORAL at 08:35

## 2019-05-25 RX ADMIN — ACETAMINOPHEN PRN MG: 325 TABLET, FILM COATED ORAL at 21:04

## 2019-05-25 RX ADMIN — MENTHOL, METHYL SALICYLATE SCH APPLIC: 10; 15 CREAM TOPICAL at 21:00

## 2019-05-25 RX ADMIN — CEFEPIME SCH MLS/HR: 2 INJECTION, POWDER, FOR SOLUTION INTRAVENOUS at 11:52

## 2019-05-25 RX ADMIN — OXYCODONE HYDROCHLORIDE AND ACETAMINOPHEN PRN TAB: 7.5; 325 TABLET ORAL at 03:55

## 2019-05-25 RX ADMIN — ONDANSETRON PRN MG: 2 INJECTION, SOLUTION INTRAMUSCULAR; INTRAVENOUS at 03:55

## 2019-05-25 RX ADMIN — MAGNESIUM OXIDE SCH MG: 400 TABLET ORAL at 20:59

## 2019-05-25 RX ADMIN — DIPHENHYDRAMINE HYDROCHLORIDE PRN MG: 25 CAPSULE ORAL at 20:59

## 2019-05-25 RX ADMIN — VORICONAZOLE SCH MG: 50 TABLET ORAL at 10:24

## 2019-05-25 RX ADMIN — VORICONAZOLE SCH MG: 50 TABLET ORAL at 21:00

## 2019-05-25 RX ADMIN — ONDANSETRON PRN MG: 2 INJECTION, SOLUTION INTRAMUSCULAR; INTRAVENOUS at 09:20

## 2019-05-25 RX ADMIN — MENTHOL, METHYL SALICYLATE SCH APPLIC: 10; 15 CREAM TOPICAL at 08:36

## 2019-05-25 RX ADMIN — CEFEPIME SCH MLS/HR: 2 INJECTION, POWDER, FOR SOLUTION INTRAVENOUS at 03:55

## 2019-05-25 RX ADMIN — POLYETHYLENE GLYCOL 3350 PRN GM: 17 POWDER, FOR SOLUTION ORAL at 08:35

## 2019-05-25 RX ADMIN — Medication PRN ML: at 03:55

## 2019-05-26 VITALS — DIASTOLIC BLOOD PRESSURE: 71 MMHG | SYSTOLIC BLOOD PRESSURE: 121 MMHG

## 2019-05-26 VITALS — SYSTOLIC BLOOD PRESSURE: 117 MMHG | DIASTOLIC BLOOD PRESSURE: 75 MMHG

## 2019-05-26 VITALS — SYSTOLIC BLOOD PRESSURE: 127 MMHG | DIASTOLIC BLOOD PRESSURE: 80 MMHG

## 2019-05-26 VITALS — SYSTOLIC BLOOD PRESSURE: 129 MMHG | DIASTOLIC BLOOD PRESSURE: 71 MMHG

## 2019-05-26 VITALS — SYSTOLIC BLOOD PRESSURE: 120 MMHG | DIASTOLIC BLOOD PRESSURE: 78 MMHG

## 2019-05-26 VITALS — DIASTOLIC BLOOD PRESSURE: 78 MMHG | SYSTOLIC BLOOD PRESSURE: 132 MMHG

## 2019-05-26 VITALS — DIASTOLIC BLOOD PRESSURE: 97 MMHG | SYSTOLIC BLOOD PRESSURE: 114 MMHG

## 2019-05-26 VITALS — SYSTOLIC BLOOD PRESSURE: 138 MMHG | DIASTOLIC BLOOD PRESSURE: 95 MMHG

## 2019-05-26 LAB
<PLATELET ESTIMATE>: (no result)
<PLT MORPHOLOGY>: (no result)
ALBUMIN SERPL-MCNC: 2.6 G/DL (ref 3.4–5)
ALP SERPL-CCNC: 94 U/L (ref 45–117)
ALT SERPL-CCNC: 11 U/L (ref 12–78)
ANION GAP SERPL CALC-SCNC: 6 MMOL/L (ref 5–15)
BILIRUB DIRECT SERPL-MCNC: NORMAL MG/DL
BILIRUB SERPL-MCNC: 0.5 MG/DL (ref 0.2–1)
CALCIUM SERPL-MCNC: 8.7 MG/DL (ref 8.5–10.1)
CHLORIDE SERPL-SCNC: 104 MMOL/L (ref 98–107)
CREAT SERPL-MCNC: 0.62 MG/DL (ref 0.55–1.02)
ERYTHROCYTE [DISTWIDTH] IN BLOOD BY AUTOMATED COUNT: 13.4 % (ref 9.6–15.2)
LYMPH#(MANUAL): 0.2 X10^3/UL (ref 1–3.4)
LYMPHS% (MANUAL): 100 % (ref 22–44)
MCH RBC QN AUTO: 32 PG (ref 27–34.8)
MCHC RBC AUTO-ENTMCNC: 34.9 G/DL (ref 32.4–35.8)
MCV RBC AUTO: 91.7 FL (ref 80–100)
MD: YES
PLATELET # BLD AUTO: 9 X10^3/UL (ref 130–400)
PMV BLD AUTO: 6.8 FL (ref 7.4–10.4)
PROT SERPL-MCNC: 6.5 G/DL (ref 6.4–8.2)
RBC # BLD AUTO: 2.86 X10^6/UL (ref 3.82–5.3)

## 2019-05-26 PROCEDURE — 30233R1 TRANSFUSION OF NONAUTOLOGOUS PLATELETS INTO PERIPHERAL VEIN, PERCUTANEOUS APPROACH: ICD-10-PCS | Performed by: HOSPITALIST

## 2019-05-26 RX ADMIN — CEFEPIME SCH MLS/HR: 2 INJECTION, POWDER, FOR SOLUTION INTRAVENOUS at 12:05

## 2019-05-26 RX ADMIN — OXYCODONE HYDROCHLORIDE AND ACETAMINOPHEN PRN TAB: 7.5; 325 TABLET ORAL at 17:05

## 2019-05-26 RX ADMIN — CEFEPIME SCH MLS/HR: 2 INJECTION, POWDER, FOR SOLUTION INTRAVENOUS at 03:53

## 2019-05-26 RX ADMIN — VORICONAZOLE SCH MG: 50 TABLET ORAL at 20:43

## 2019-05-26 RX ADMIN — PANTOPRAZOLE SODIUM SCH MG: 40 TABLET, DELAYED RELEASE ORAL at 06:00

## 2019-05-26 RX ADMIN — ACETAMINOPHEN PRN MG: 325 TABLET, FILM COATED ORAL at 08:40

## 2019-05-26 RX ADMIN — MENTHOL, METHYL SALICYLATE SCH APPLIC: 10; 15 CREAM TOPICAL at 20:43

## 2019-05-26 RX ADMIN — ONDANSETRON PRN MG: 2 INJECTION, SOLUTION INTRAMUSCULAR; INTRAVENOUS at 08:40

## 2019-05-26 RX ADMIN — Medication PRN ML: at 12:06

## 2019-05-26 RX ADMIN — Medication PRN ML: at 18:23

## 2019-05-26 RX ADMIN — Medication PRN ML: at 04:52

## 2019-05-26 RX ADMIN — MAGNESIUM OXIDE SCH MG: 400 TABLET ORAL at 08:40

## 2019-05-26 RX ADMIN — MENTHOL, METHYL SALICYLATE SCH APPLIC: 10; 15 CREAM TOPICAL at 17:02

## 2019-05-26 RX ADMIN — MENTHOL, METHYL SALICYLATE SCH APPLIC: 10; 15 CREAM TOPICAL at 08:41

## 2019-05-26 RX ADMIN — DIPHENHYDRAMINE HYDROCHLORIDE PRN MG: 25 CAPSULE ORAL at 08:40

## 2019-05-26 RX ADMIN — POLYETHYLENE GLYCOL 3350 PRN GM: 17 POWDER, FOR SOLUTION ORAL at 06:06

## 2019-05-26 RX ADMIN — CEFEPIME SCH MLS/HR: 2 INJECTION, POWDER, FOR SOLUTION INTRAVENOUS at 19:56

## 2019-05-26 RX ADMIN — ONDANSETRON PRN MG: 2 INJECTION, SOLUTION INTRAMUSCULAR; INTRAVENOUS at 03:53

## 2019-05-26 RX ADMIN — ACETAMINOPHEN PRN MG: 325 TABLET, FILM COATED ORAL at 18:37

## 2019-05-26 RX ADMIN — MAGNESIUM OXIDE SCH MG: 400 TABLET ORAL at 20:43

## 2019-05-26 RX ADMIN — VORICONAZOLE SCH MG: 50 TABLET ORAL at 08:41

## 2019-05-26 RX ADMIN — ONDANSETRON PRN MG: 2 INJECTION, SOLUTION INTRAMUSCULAR; INTRAVENOUS at 20:42

## 2019-05-27 VITALS — DIASTOLIC BLOOD PRESSURE: 72 MMHG | SYSTOLIC BLOOD PRESSURE: 142 MMHG

## 2019-05-27 VITALS — SYSTOLIC BLOOD PRESSURE: 123 MMHG | DIASTOLIC BLOOD PRESSURE: 74 MMHG

## 2019-05-27 VITALS — DIASTOLIC BLOOD PRESSURE: 68 MMHG | SYSTOLIC BLOOD PRESSURE: 99 MMHG

## 2019-05-27 VITALS — DIASTOLIC BLOOD PRESSURE: 80 MMHG | SYSTOLIC BLOOD PRESSURE: 126 MMHG

## 2019-05-27 VITALS — SYSTOLIC BLOOD PRESSURE: 107 MMHG | DIASTOLIC BLOOD PRESSURE: 63 MMHG

## 2019-05-27 VITALS — SYSTOLIC BLOOD PRESSURE: 132 MMHG | DIASTOLIC BLOOD PRESSURE: 84 MMHG

## 2019-05-27 VITALS — SYSTOLIC BLOOD PRESSURE: 107 MMHG | DIASTOLIC BLOOD PRESSURE: 65 MMHG

## 2019-05-27 LAB
<PLATELET ESTIMATE>: (no result)
<PLT MORPHOLOGY>: (no result)
ALBUMIN SERPL-MCNC: 2.7 G/DL (ref 3.4–5)
ALP SERPL-CCNC: 98 U/L (ref 45–117)
ALT SERPL-CCNC: 11 U/L (ref 12–78)
ANION GAP SERPL CALC-SCNC: 5 MMOL/L (ref 5–15)
BILIRUB DIRECT SERPL-MCNC: NORMAL MG/DL
BILIRUB SERPL-MCNC: 0.5 MG/DL (ref 0.2–1)
CALCIUM SERPL-MCNC: 8.8 MG/DL (ref 8.5–10.1)
CHLORIDE SERPL-SCNC: 98 MMOL/L (ref 98–107)
CREAT SERPL-MCNC: 0.68 MG/DL (ref 0.55–1.02)
CULTURE INDICATED?: NO
ERYTHROCYTE [DISTWIDTH] IN BLOOD BY AUTOMATED COUNT: 13.5 % (ref 9.6–15.2)
LYMPH#(MANUAL): 0.2 X10^3/UL (ref 1–3.4)
LYMPHS% (MANUAL): 98 % (ref 22–44)
MCH RBC QN AUTO: 31.5 PG (ref 27–34.8)
MCHC RBC AUTO-ENTMCNC: 34.1 G/DL (ref 32.4–35.8)
MCV RBC AUTO: 92.1 FL (ref 80–100)
MD: YES
MICROSCOPIC: (no result)
PLATELET # BLD AUTO: 5 X10^3/UL (ref 130–400)
PMV BLD AUTO: 8.7 FL (ref 7.4–10.4)
PROT SERPL-MCNC: 6.8 G/DL (ref 6.4–8.2)
RBC # BLD AUTO: 2.84 X10^6/UL (ref 3.82–5.3)
REACTIVE LYMPHS # (MANUAL): 0 X10^3/UL (ref 0–0)
REACTIVE LYMPHS % (MANUAL): 2 % (ref 0–0)
SEG#(MANUAL): 0 X10^3/UL (ref 1.8–6.8)
SEGS% (MANUAL): 0 % (ref 42–75)

## 2019-05-27 RX ADMIN — PANTOPRAZOLE SODIUM SCH MG: 40 TABLET, DELAYED RELEASE ORAL at 06:00

## 2019-05-27 RX ADMIN — OXYCODONE HYDROCHLORIDE AND ACETAMINOPHEN PRN TAB: 7.5; 325 TABLET ORAL at 19:32

## 2019-05-27 RX ADMIN — MEROPENEM SCH MLS/HR: 1 INJECTION, POWDER, FOR SOLUTION INTRAVENOUS at 14:21

## 2019-05-27 RX ADMIN — MENTHOL, METHYL SALICYLATE SCH APPLIC: 10; 15 CREAM TOPICAL at 19:32

## 2019-05-27 RX ADMIN — DIPHENHYDRAMINE HYDROCHLORIDE PRN MG: 25 CAPSULE ORAL at 22:21

## 2019-05-27 RX ADMIN — MEROPENEM SCH MLS/HR: 1 INJECTION, POWDER, FOR SOLUTION INTRAVENOUS at 23:52

## 2019-05-27 RX ADMIN — MICAFUNGIN SODIUM SCH MLS/HR: 20 INJECTION, POWDER, LYOPHILIZED, FOR SOLUTION INTRAVENOUS at 10:25

## 2019-05-27 RX ADMIN — DIPHENHYDRAMINE HYDROCHLORIDE PRN MG: 25 CAPSULE ORAL at 11:14

## 2019-05-27 RX ADMIN — CEFEPIME SCH MLS/HR: 2 INJECTION, POWDER, FOR SOLUTION INTRAVENOUS at 03:33

## 2019-05-27 RX ADMIN — Medication PRN ML: at 17:26

## 2019-05-27 RX ADMIN — ACETAMINOPHEN PRN MG: 325 TABLET, FILM COATED ORAL at 19:26

## 2019-05-27 RX ADMIN — MENTHOL, METHYL SALICYLATE SCH APPLIC: 10; 15 CREAM TOPICAL at 10:25

## 2019-05-27 RX ADMIN — MAGNESIUM OXIDE SCH MG: 400 TABLET ORAL at 10:24

## 2019-05-27 RX ADMIN — MENTHOL, METHYL SALICYLATE SCH APPLIC: 10; 15 CREAM TOPICAL at 16:48

## 2019-05-27 RX ADMIN — PROCHLORPERAZINE EDISYLATE PRN MG: 5 INJECTION INTRAMUSCULAR; INTRAVENOUS at 11:15

## 2019-05-27 RX ADMIN — ONDANSETRON PRN MG: 2 INJECTION, SOLUTION INTRAMUSCULAR; INTRAVENOUS at 04:59

## 2019-05-27 RX ADMIN — MAGNESIUM OXIDE SCH MG: 400 TABLET ORAL at 19:26

## 2019-05-27 RX ADMIN — Medication PRN ML: at 06:24

## 2019-05-27 RX ADMIN — ACETAMINOPHEN PRN MG: 325 TABLET, FILM COATED ORAL at 11:15

## 2019-05-27 RX ADMIN — MEROPENEM SCH MLS/HR: 1 INJECTION, POWDER, FOR SOLUTION INTRAVENOUS at 06:22

## 2019-05-27 RX ADMIN — ACETAMINOPHEN PRN MG: 325 TABLET, FILM COATED ORAL at 03:41

## 2019-05-27 RX ADMIN — OXYCODONE HYDROCHLORIDE AND ACETAMINOPHEN PRN TAB: 7.5; 325 TABLET ORAL at 05:00

## 2019-05-28 VITALS — DIASTOLIC BLOOD PRESSURE: 68 MMHG | SYSTOLIC BLOOD PRESSURE: 114 MMHG

## 2019-05-28 VITALS — SYSTOLIC BLOOD PRESSURE: 129 MMHG | DIASTOLIC BLOOD PRESSURE: 85 MMHG

## 2019-05-28 VITALS — DIASTOLIC BLOOD PRESSURE: 78 MMHG | SYSTOLIC BLOOD PRESSURE: 127 MMHG

## 2019-05-28 VITALS — DIASTOLIC BLOOD PRESSURE: 69 MMHG | SYSTOLIC BLOOD PRESSURE: 109 MMHG

## 2019-05-28 LAB
<PLATELET ESTIMATE>: (no result)
<PLT MORPHOLOGY>: (no result)
ALBUMIN SERPL-MCNC: 2.6 G/DL (ref 3.4–5)
ALP SERPL-CCNC: 95 U/L (ref 45–117)
ALT SERPL-CCNC: 11 U/L (ref 12–78)
ANION GAP SERPL CALC-SCNC: 7 MMOL/L (ref 5–15)
BILIRUB DIRECT SERPL-MCNC: NORMAL MG/DL
BILIRUB SERPL-MCNC: 0.2 MG/DL (ref 0.2–1)
CALCIUM SERPL-MCNC: 8.6 MG/DL (ref 8.5–10.1)
CHLORIDE SERPL-SCNC: 101 MMOL/L (ref 98–107)
CREAT SERPL-MCNC: 0.61 MG/DL (ref 0.55–1.02)
ERYTHROCYTE [DISTWIDTH] IN BLOOD BY AUTOMATED COUNT: 13.3 % (ref 9.6–15.2)
LYMPH#(MANUAL): 0.1 X10^3/UL (ref 1–3.4)
LYMPHS% (MANUAL): 100 % (ref 22–44)
MCH RBC QN AUTO: 31.8 PG (ref 27–34.8)
MCHC RBC AUTO-ENTMCNC: 34.9 G/DL (ref 32.4–35.8)
MCV RBC AUTO: 91.2 FL (ref 80–100)
MD: YES
PLATELET # BLD AUTO: 27 X10^3/UL (ref 130–400)
PMV BLD AUTO: 7.7 FL (ref 7.4–10.4)
PROT SERPL-MCNC: 6.5 G/DL (ref 6.4–8.2)
RBC # BLD AUTO: 2.72 X10^6/UL (ref 3.82–5.3)
SEG#(MANUAL): 0 X10^3/UL (ref 1.8–6.8)
SEGS% (MANUAL): 0 % (ref 42–75)

## 2019-05-28 RX ADMIN — MENTHOL, METHYL SALICYLATE SCH APPLIC: 10; 15 CREAM TOPICAL at 16:52

## 2019-05-28 RX ADMIN — Medication PRN ML: at 19:30

## 2019-05-28 RX ADMIN — ONDANSETRON PRN MG: 2 INJECTION, SOLUTION INTRAMUSCULAR; INTRAVENOUS at 22:14

## 2019-05-28 RX ADMIN — OXYCODONE HYDROCHLORIDE AND ACETAMINOPHEN PRN TAB: 7.5; 325 TABLET ORAL at 07:28

## 2019-05-28 RX ADMIN — Medication PRN ML: at 11:19

## 2019-05-28 RX ADMIN — OXYCODONE HYDROCHLORIDE AND ACETAMINOPHEN PRN TAB: 7.5; 325 TABLET ORAL at 15:29

## 2019-05-28 RX ADMIN — MEROPENEM SCH MLS/HR: 1 INJECTION, POWDER, FOR SOLUTION INTRAVENOUS at 07:58

## 2019-05-28 RX ADMIN — ACETAMINOPHEN PRN MG: 325 TABLET, FILM COATED ORAL at 03:57

## 2019-05-28 RX ADMIN — MAGNESIUM OXIDE SCH MG: 400 TABLET ORAL at 21:00

## 2019-05-28 RX ADMIN — MAGNESIUM OXIDE SCH MG: 400 TABLET ORAL at 07:58

## 2019-05-28 RX ADMIN — ERAVACYCLINE SCH MLS/HR: 50 INJECTION, POWDER, LYOPHILIZED, FOR SOLUTION INTRAVENOUS at 23:03

## 2019-05-28 RX ADMIN — DIPHENHYDRAMINE HYDROCHLORIDE PRN MG: 25 CAPSULE ORAL at 21:07

## 2019-05-28 RX ADMIN — MICAFUNGIN SODIUM SCH MLS/HR: 20 INJECTION, POWDER, LYOPHILIZED, FOR SOLUTION INTRAVENOUS at 10:03

## 2019-05-28 RX ADMIN — LORAZEPAM PRN MG: 2 INJECTION INTRAMUSCULAR; INTRAVENOUS at 23:13

## 2019-05-28 RX ADMIN — ERAVACYCLINE SCH MLS/HR: 50 INJECTION, POWDER, LYOPHILIZED, FOR SOLUTION INTRAVENOUS at 11:47

## 2019-05-28 RX ADMIN — MENTHOL, METHYL SALICYLATE SCH APPLIC: 10; 15 CREAM TOPICAL at 07:59

## 2019-05-28 RX ADMIN — MENTHOL, METHYL SALICYLATE SCH APPLIC: 10; 15 CREAM TOPICAL at 21:07

## 2019-05-28 RX ADMIN — PANTOPRAZOLE SODIUM SCH MG: 40 TABLET, DELAYED RELEASE ORAL at 05:46

## 2019-05-28 RX ADMIN — POLYETHYLENE GLYCOL 3350 PRN GM: 17 POWDER, FOR SOLUTION ORAL at 07:58

## 2019-05-28 RX ADMIN — OXYCODONE HYDROCHLORIDE AND ACETAMINOPHEN PRN TAB: 7.5; 325 TABLET ORAL at 22:14

## 2019-05-29 VITALS — SYSTOLIC BLOOD PRESSURE: 148 MMHG | DIASTOLIC BLOOD PRESSURE: 84 MMHG

## 2019-05-29 VITALS — DIASTOLIC BLOOD PRESSURE: 75 MMHG | SYSTOLIC BLOOD PRESSURE: 125 MMHG

## 2019-05-29 VITALS — DIASTOLIC BLOOD PRESSURE: 73 MMHG | SYSTOLIC BLOOD PRESSURE: 113 MMHG

## 2019-05-29 VITALS — SYSTOLIC BLOOD PRESSURE: 111 MMHG | DIASTOLIC BLOOD PRESSURE: 72 MMHG

## 2019-05-29 LAB
ALBUMIN SERPL-MCNC: 2.5 G/DL (ref 3.4–5)
ALP SERPL-CCNC: 98 U/L (ref 45–117)
ALT SERPL-CCNC: 13 U/L (ref 12–78)
ANION GAP SERPL CALC-SCNC: 5 MMOL/L (ref 5–15)
BILIRUB SERPL-MCNC: 0.4 MG/DL (ref 0.2–1)
CALCIUM SERPL-MCNC: 8.5 MG/DL (ref 8.5–10.1)
CHLORIDE SERPL-SCNC: 102 MMOL/L (ref 98–107)
CREAT SERPL-MCNC: 0.51 MG/DL (ref 0.55–1.02)
ERYTHROCYTE [DISTWIDTH] IN BLOOD BY AUTOMATED COUNT: 13.4 % (ref 9.6–15.2)
MCH RBC QN AUTO: 31.1 PG (ref 27–34.8)
MCHC RBC AUTO-ENTMCNC: 34.3 G/DL (ref 32.4–35.8)
MCV RBC AUTO: 90.7 FL (ref 80–100)
PLATELET # BLD AUTO: 17 X10^3/UL (ref 130–400)
PMV BLD AUTO: 8.4 FL (ref 7.4–10.4)
PROT SERPL-MCNC: 6.2 G/DL (ref 6.4–8.2)
RBC # BLD AUTO: 2.7 X10^6/UL (ref 3.82–5.3)

## 2019-05-29 RX ADMIN — MENTHOL, METHYL SALICYLATE SCH APPLIC: 10; 15 CREAM TOPICAL at 08:53

## 2019-05-29 RX ADMIN — MAGNESIUM OXIDE SCH MG: 400 TABLET ORAL at 08:43

## 2019-05-29 RX ADMIN — ONDANSETRON PRN MG: 2 INJECTION, SOLUTION INTRAMUSCULAR; INTRAVENOUS at 12:25

## 2019-05-29 RX ADMIN — Medication PRN ML: at 17:18

## 2019-05-29 RX ADMIN — Medication PRN ML: at 05:55

## 2019-05-29 RX ADMIN — OXYCODONE HYDROCHLORIDE AND ACETAMINOPHEN PRN TAB: 7.5; 325 TABLET ORAL at 02:17

## 2019-05-29 RX ADMIN — DOCUSATE SODIUM SCH MG: 100 CAPSULE, LIQUID FILLED ORAL at 08:43

## 2019-05-29 RX ADMIN — ERAVACYCLINE SCH MLS/HR: 50 INJECTION, POWDER, LYOPHILIZED, FOR SOLUTION INTRAVENOUS at 12:33

## 2019-05-29 RX ADMIN — MENTHOL, METHYL SALICYLATE SCH APPLIC: 10; 15 CREAM TOPICAL at 16:01

## 2019-05-29 RX ADMIN — LORAZEPAM PRN MG: 2 INJECTION INTRAMUSCULAR; INTRAVENOUS at 20:58

## 2019-05-29 RX ADMIN — POLYETHYLENE GLYCOL 3350 PRN GM: 17 POWDER, FOR SOLUTION ORAL at 08:44

## 2019-05-29 RX ADMIN — Medication PRN ML: at 21:30

## 2019-05-29 RX ADMIN — DOCUSATE SODIUM SCH MG: 100 CAPSULE, LIQUID FILLED ORAL at 20:58

## 2019-05-29 RX ADMIN — OXYCODONE HYDROCHLORIDE AND ACETAMINOPHEN PRN TAB: 7.5; 325 TABLET ORAL at 12:22

## 2019-05-29 RX ADMIN — OXYCODONE HYDROCHLORIDE AND ACETAMINOPHEN PRN TAB: 7.5; 325 TABLET ORAL at 20:58

## 2019-05-29 RX ADMIN — PROCHLORPERAZINE EDISYLATE PRN MG: 5 INJECTION INTRAMUSCULAR; INTRAVENOUS at 06:56

## 2019-05-29 RX ADMIN — MAGNESIUM OXIDE SCH MG: 400 TABLET ORAL at 20:58

## 2019-05-29 RX ADMIN — PANTOPRAZOLE SODIUM SCH MG: 40 TABLET, DELAYED RELEASE ORAL at 05:55

## 2019-05-29 RX ADMIN — ERAVACYCLINE SCH MLS/HR: 50 INJECTION, POWDER, LYOPHILIZED, FOR SOLUTION INTRAVENOUS at 23:09

## 2019-05-29 RX ADMIN — MENTHOL, METHYL SALICYLATE SCH APPLIC: 10; 15 CREAM TOPICAL at 20:58

## 2019-05-29 RX ADMIN — ONDANSETRON PRN MG: 2 INJECTION, SOLUTION INTRAMUSCULAR; INTRAVENOUS at 21:18

## 2019-05-29 RX ADMIN — DIPHENHYDRAMINE HYDROCHLORIDE PRN MG: 25 CAPSULE ORAL at 20:58

## 2019-05-29 RX ADMIN — MICAFUNGIN SODIUM SCH MLS/HR: 20 INJECTION, POWDER, LYOPHILIZED, FOR SOLUTION INTRAVENOUS at 08:43

## 2019-05-30 VITALS — DIASTOLIC BLOOD PRESSURE: 64 MMHG | SYSTOLIC BLOOD PRESSURE: 98 MMHG

## 2019-05-30 VITALS — SYSTOLIC BLOOD PRESSURE: 124 MMHG | DIASTOLIC BLOOD PRESSURE: 72 MMHG

## 2019-05-30 VITALS — DIASTOLIC BLOOD PRESSURE: 78 MMHG | SYSTOLIC BLOOD PRESSURE: 133 MMHG

## 2019-05-30 VITALS — DIASTOLIC BLOOD PRESSURE: 63 MMHG | SYSTOLIC BLOOD PRESSURE: 99 MMHG

## 2019-05-30 VITALS — SYSTOLIC BLOOD PRESSURE: 96 MMHG | DIASTOLIC BLOOD PRESSURE: 62 MMHG

## 2019-05-30 VITALS — SYSTOLIC BLOOD PRESSURE: 102 MMHG | DIASTOLIC BLOOD PRESSURE: 66 MMHG

## 2019-05-30 LAB
<PLATELET ESTIMATE>: (no result)
<PLT MORPHOLOGY>: (no result)
ALBUMIN SERPL-MCNC: 2.4 G/DL (ref 3.4–5)
ALP SERPL-CCNC: 99 U/L (ref 45–117)
ALT SERPL-CCNC: 14 U/L (ref 12–78)
ANION GAP SERPL CALC-SCNC: 7 MMOL/L (ref 5–15)
BAND#(MANUAL): 0 X10^3/UL
BILIRUB DIRECT SERPL-MCNC: NORMAL MG/DL
BILIRUB SERPL-MCNC: 0.5 MG/DL (ref 0.2–1)
CALCIUM SERPL-MCNC: 8.3 MG/DL (ref 8.5–10.1)
CHLORIDE SERPL-SCNC: 100 MMOL/L (ref 98–107)
CREAT SERPL-MCNC: 0.55 MG/DL (ref 0.55–1.02)
ERYTHROCYTE [DISTWIDTH] IN BLOOD BY AUTOMATED COUNT: 13.1 % (ref 9.6–15.2)
LYMPH#(MANUAL): 0.14 X10^3/UL (ref 1–3.4)
LYMPHS% (MANUAL): 72 % (ref 22–44)
MCH RBC QN AUTO: 31.7 PG (ref 27–34.8)
MCHC RBC AUTO-ENTMCNC: 34.8 G/DL (ref 32.4–35.8)
MCV RBC AUTO: 91.3 FL (ref 80–100)
MD: YES
MONOS#(MANUAL): 0.03 X10^3/UL (ref 0.3–2.7)
MONOS% (MANUAL): 17 % (ref 2–9)
NEUTS BAND NFR BLD: 1 % (ref 0–7)
PLATELET # BLD AUTO: 9 X10^3/UL (ref 130–400)
PMV BLD AUTO: 9.5 FL (ref 7.4–10.4)
PROT SERPL-MCNC: 5.6 G/DL (ref 6.4–8.2)
RBC # BLD AUTO: 2.56 X10^6/UL (ref 3.82–5.3)
SEG#(MANUAL): 0.02 X10^3/UL (ref 1.8–6.8)
SEGS% (MANUAL): 10 % (ref 42–75)

## 2019-05-30 RX ADMIN — ACETAMINOPHEN PRN MG: 325 TABLET, FILM COATED ORAL at 18:36

## 2019-05-30 RX ADMIN — ERAVACYCLINE SCH MLS/HR: 50 INJECTION, POWDER, LYOPHILIZED, FOR SOLUTION INTRAVENOUS at 11:29

## 2019-05-30 RX ADMIN — MICAFUNGIN SODIUM SCH MLS/HR: 20 INJECTION, POWDER, LYOPHILIZED, FOR SOLUTION INTRAVENOUS at 09:39

## 2019-05-30 RX ADMIN — OXYCODONE HYDROCHLORIDE AND ACETAMINOPHEN PRN TAB: 7.5; 325 TABLET ORAL at 20:28

## 2019-05-30 RX ADMIN — DOCUSATE SODIUM SCH MG: 100 CAPSULE, LIQUID FILLED ORAL at 20:28

## 2019-05-30 RX ADMIN — OXYCODONE HYDROCHLORIDE AND ACETAMINOPHEN PRN TAB: 7.5; 325 TABLET ORAL at 07:57

## 2019-05-30 RX ADMIN — GUAIFENESIN AND DEXTROMETHORPHAN PRN ML: 100; 10 SYRUP ORAL at 20:28

## 2019-05-30 RX ADMIN — PANTOPRAZOLE SODIUM SCH MG: 40 TABLET, DELAYED RELEASE ORAL at 07:57

## 2019-05-30 RX ADMIN — ACETAMINOPHEN PRN MG: 325 TABLET, FILM COATED ORAL at 07:57

## 2019-05-30 RX ADMIN — MAGNESIUM OXIDE SCH MG: 400 TABLET ORAL at 09:39

## 2019-05-30 RX ADMIN — DOCUSATE SODIUM SCH MG: 100 CAPSULE, LIQUID FILLED ORAL at 09:39

## 2019-05-30 RX ADMIN — ONDANSETRON PRN MG: 2 INJECTION, SOLUTION INTRAMUSCULAR; INTRAVENOUS at 09:39

## 2019-05-30 RX ADMIN — MENTHOL, METHYL SALICYLATE SCH APPLIC: 10; 15 CREAM TOPICAL at 20:28

## 2019-05-30 RX ADMIN — ERAVACYCLINE SCH MLS/HR: 50 INJECTION, POWDER, LYOPHILIZED, FOR SOLUTION INTRAVENOUS at 23:19

## 2019-05-30 RX ADMIN — MENTHOL, METHYL SALICYLATE SCH APPLIC: 10; 15 CREAM TOPICAL at 09:44

## 2019-05-30 RX ADMIN — Medication PRN ML: at 09:47

## 2019-05-30 RX ADMIN — Medication PRN ML: at 21:57

## 2019-05-30 RX ADMIN — MENTHOL, METHYL SALICYLATE SCH APPLIC: 10; 15 CREAM TOPICAL at 15:31

## 2019-05-30 RX ADMIN — POLYETHYLENE GLYCOL 3350 PRN GM: 17 POWDER, FOR SOLUTION ORAL at 09:39

## 2019-05-30 RX ADMIN — DIPHENHYDRAMINE HYDROCHLORIDE PRN MG: 25 CAPSULE ORAL at 21:57

## 2019-05-30 RX ADMIN — MAGNESIUM OXIDE SCH MG: 400 TABLET ORAL at 20:28

## 2019-05-31 VITALS — SYSTOLIC BLOOD PRESSURE: 116 MMHG | DIASTOLIC BLOOD PRESSURE: 63 MMHG

## 2019-05-31 VITALS — DIASTOLIC BLOOD PRESSURE: 63 MMHG | SYSTOLIC BLOOD PRESSURE: 99 MMHG

## 2019-05-31 VITALS — SYSTOLIC BLOOD PRESSURE: 92 MMHG | DIASTOLIC BLOOD PRESSURE: 56 MMHG

## 2019-05-31 VITALS — SYSTOLIC BLOOD PRESSURE: 126 MMHG | DIASTOLIC BLOOD PRESSURE: 75 MMHG

## 2019-05-31 LAB
<PLATELET ESTIMATE>: (no result)
<PLT MORPHOLOGY>: (no result)
ALBUMIN SERPL-MCNC: 2.4 G/DL (ref 3.4–5)
ALP SERPL-CCNC: 107 U/L (ref 45–117)
ALT SERPL-CCNC: 14 U/L (ref 12–78)
ANION GAP SERPL CALC-SCNC: 6 MMOL/L (ref 5–15)
BILIRUB DIRECT SERPL-MCNC: NORMAL MG/DL
BILIRUB SERPL-MCNC: 0.3 MG/DL (ref 0.2–1)
CALCIUM SERPL-MCNC: 8.4 MG/DL (ref 8.5–10.1)
CHLORIDE SERPL-SCNC: 103 MMOL/L (ref 98–107)
CREAT SERPL-MCNC: 0.52 MG/DL (ref 0.55–1.02)
ERYTHROCYTE [DISTWIDTH] IN BLOOD BY AUTOMATED COUNT: 12.9 % (ref 9.6–15.2)
LYMPH#(MANUAL): 0.23 X10^3/UL (ref 1–3.4)
LYMPHS% (MANUAL): 76 % (ref 22–44)
MCH RBC QN AUTO: 31 PG (ref 27–34.8)
MCHC RBC AUTO-ENTMCNC: 34.1 G/DL (ref 32.4–35.8)
MCV RBC AUTO: 91 FL (ref 80–100)
MD: YES
MONOS#(MANUAL): 0.03 X10^3/UL (ref 0.3–2.7)
MONOS% (MANUAL): 10 % (ref 2–9)
PLATELET # BLD AUTO: 36 X10^3/UL (ref 130–400)
PMV BLD AUTO: 8.9 FL (ref 7.4–10.4)
PROT SERPL-MCNC: 5.8 G/DL (ref 6.4–8.2)
RBC # BLD AUTO: 2.53 X10^6/UL (ref 3.82–5.3)
SEG#(MANUAL): 0.04 X10^3/UL (ref 1.8–6.8)
SEGS% (MANUAL): 14 % (ref 42–75)

## 2019-05-31 RX ADMIN — MICAFUNGIN SODIUM SCH MLS/HR: 20 INJECTION, POWDER, LYOPHILIZED, FOR SOLUTION INTRAVENOUS at 09:30

## 2019-05-31 RX ADMIN — GUAIFENESIN AND DEXTROMETHORPHAN PRN ML: 100; 10 SYRUP ORAL at 02:09

## 2019-05-31 RX ADMIN — MENTHOL, METHYL SALICYLATE SCH APPLIC: 10; 15 CREAM TOPICAL at 21:22

## 2019-05-31 RX ADMIN — OXYCODONE HYDROCHLORIDE AND ACETAMINOPHEN PRN TAB: 7.5; 325 TABLET ORAL at 21:22

## 2019-05-31 RX ADMIN — OXYCODONE HYDROCHLORIDE AND ACETAMINOPHEN PRN TAB: 7.5; 325 TABLET ORAL at 15:42

## 2019-05-31 RX ADMIN — Medication PRN ML: at 17:59

## 2019-05-31 RX ADMIN — MAGNESIUM OXIDE SCH MG: 400 TABLET ORAL at 09:29

## 2019-05-31 RX ADMIN — DOCUSATE SODIUM SCH MG: 100 CAPSULE, LIQUID FILLED ORAL at 21:00

## 2019-05-31 RX ADMIN — ERAVACYCLINE SCH MLS/HR: 50 INJECTION, POWDER, LYOPHILIZED, FOR SOLUTION INTRAVENOUS at 11:26

## 2019-05-31 RX ADMIN — GUAIFENESIN AND DEXTROMETHORPHAN PRN ML: 100; 10 SYRUP ORAL at 23:25

## 2019-05-31 RX ADMIN — OXYCODONE HYDROCHLORIDE AND ACETAMINOPHEN PRN TAB: 7.5; 325 TABLET ORAL at 06:00

## 2019-05-31 RX ADMIN — PANTOPRAZOLE SODIUM SCH MG: 40 TABLET, DELAYED RELEASE ORAL at 06:00

## 2019-05-31 RX ADMIN — GUAIFENESIN AND DEXTROMETHORPHAN PRN ML: 100; 10 SYRUP ORAL at 13:39

## 2019-05-31 RX ADMIN — Medication PRN ML: at 15:42

## 2019-05-31 RX ADMIN — Medication SCH MG: at 13:39

## 2019-05-31 RX ADMIN — ERAVACYCLINE SCH MLS/HR: 50 INJECTION, POWDER, LYOPHILIZED, FOR SOLUTION INTRAVENOUS at 23:26

## 2019-05-31 RX ADMIN — MENTHOL, METHYL SALICYLATE SCH APPLIC: 10; 15 CREAM TOPICAL at 09:30

## 2019-05-31 RX ADMIN — MAGNESIUM OXIDE SCH MG: 400 TABLET ORAL at 21:22

## 2019-05-31 RX ADMIN — Medication PRN ML: at 09:29

## 2019-05-31 RX ADMIN — MENTHOL, METHYL SALICYLATE SCH APPLIC: 10; 15 CREAM TOPICAL at 18:00

## 2019-05-31 RX ADMIN — POLYETHYLENE GLYCOL 3350 SCH GM: 17 POWDER, FOR SOLUTION ORAL at 09:30

## 2019-05-31 RX ADMIN — Medication PRN ML: at 21:21

## 2019-05-31 RX ADMIN — DOCUSATE SODIUM SCH MG: 100 CAPSULE, LIQUID FILLED ORAL at 09:29

## 2019-06-01 VITALS — DIASTOLIC BLOOD PRESSURE: 66 MMHG | SYSTOLIC BLOOD PRESSURE: 108 MMHG

## 2019-06-01 VITALS — SYSTOLIC BLOOD PRESSURE: 104 MMHG | DIASTOLIC BLOOD PRESSURE: 67 MMHG

## 2019-06-01 VITALS — SYSTOLIC BLOOD PRESSURE: 136 MMHG | DIASTOLIC BLOOD PRESSURE: 86 MMHG

## 2019-06-01 VITALS — SYSTOLIC BLOOD PRESSURE: 129 MMHG | DIASTOLIC BLOOD PRESSURE: 76 MMHG

## 2019-06-01 LAB
<PLATELET ESTIMATE>: (no result)
<PLT MORPHOLOGY>: (no result)
ALBUMIN SERPL-MCNC: 2.1 G/DL (ref 3.4–5)
ALP SERPL-CCNC: 103 U/L (ref 45–117)
ALT SERPL-CCNC: 12 U/L (ref 12–78)
ANION GAP SERPL CALC-SCNC: 5 MMOL/L (ref 5–15)
BAND#(MANUAL): 0.04 X10^3/UL
BILIRUB SERPL-MCNC: 0.3 MG/DL (ref 0.2–1)
CALCIUM SERPL-MCNC: 8.1 MG/DL (ref 8.5–10.1)
CHLORIDE SERPL-SCNC: 105 MMOL/L (ref 98–107)
CREAT SERPL-MCNC: 0.45 MG/DL (ref 0.55–1.02)
ERYTHROCYTE [DISTWIDTH] IN BLOOD BY AUTOMATED COUNT: 13.3 % (ref 9.6–15.2)
LYMPH#(MANUAL): 0.28 X10^3/UL (ref 1–3.4)
LYMPHS% (MANUAL): 56 % (ref 22–44)
MCH RBC QN AUTO: 30.9 PG (ref 27–34.8)
MCHC RBC AUTO-ENTMCNC: 33.9 G/DL (ref 32.4–35.8)
MCV RBC AUTO: 91.3 FL (ref 80–100)
MD: YES
MONOS#(MANUAL): 0.08 X10^3/UL (ref 0.3–2.7)
MONOS% (MANUAL): 16 % (ref 2–9)
NEUTS BAND NFR BLD: 8 % (ref 0–7)
PLATELET # BLD AUTO: 40 X10^3/UL (ref 130–400)
PMV BLD AUTO: 8.9 FL (ref 7.4–10.4)
PROT SERPL-MCNC: 5.2 G/DL (ref 6.4–8.2)
RBC # BLD AUTO: 2.41 X10^6/UL (ref 3.82–5.3)
REACTIVE LYMPHS # (MANUAL): 0.02 X10^3/UL (ref 0–0)
REACTIVE LYMPHS % (MANUAL): 4 % (ref 0–0)
SEG#(MANUAL): 0.08 X10^3/UL (ref 1.8–6.8)
SEGS% (MANUAL): 16 % (ref 42–75)

## 2019-06-01 RX ADMIN — MENTHOL, METHYL SALICYLATE SCH APPLIC: 10; 15 CREAM TOPICAL at 21:04

## 2019-06-01 RX ADMIN — POLYETHYLENE GLYCOL 3350 SCH GM: 17 POWDER, FOR SOLUTION ORAL at 08:14

## 2019-06-01 RX ADMIN — TRIAMCINOLONE ACETONIDE PRN APPLIC: 1 CREAM TOPICAL at 21:04

## 2019-06-01 RX ADMIN — MENTHOL, METHYL SALICYLATE SCH APPLIC: 10; 15 CREAM TOPICAL at 16:00

## 2019-06-01 RX ADMIN — MENTHOL, METHYL SALICYLATE SCH APPLIC: 10; 15 CREAM TOPICAL at 08:28

## 2019-06-01 RX ADMIN — OXYCODONE HYDROCHLORIDE AND ACETAMINOPHEN PRN TAB: 7.5; 325 TABLET ORAL at 17:44

## 2019-06-01 RX ADMIN — GUAIFENESIN AND DEXTROMETHORPHAN PRN ML: 100; 10 SYRUP ORAL at 21:02

## 2019-06-01 RX ADMIN — PANTOPRAZOLE SODIUM SCH MG: 40 TABLET, DELAYED RELEASE ORAL at 05:49

## 2019-06-01 RX ADMIN — Medication PRN ML: at 17:14

## 2019-06-01 RX ADMIN — Medication SCH MG: at 08:14

## 2019-06-01 RX ADMIN — OXYCODONE HYDROCHLORIDE AND ACETAMINOPHEN PRN TAB: 7.5; 325 TABLET ORAL at 05:50

## 2019-06-01 RX ADMIN — OXYCODONE HYDROCHLORIDE AND ACETAMINOPHEN PRN TAB: 7.5; 325 TABLET ORAL at 01:25

## 2019-06-01 RX ADMIN — MAGNESIUM OXIDE SCH MG: 400 TABLET ORAL at 08:15

## 2019-06-01 RX ADMIN — OXYCODONE HYDROCHLORIDE AND ACETAMINOPHEN PRN TAB: 7.5; 325 TABLET ORAL at 14:03

## 2019-06-01 RX ADMIN — TRIAMCINOLONE ACETONIDE PRN APPLIC: 1 CREAM TOPICAL at 11:27

## 2019-06-01 RX ADMIN — ERAVACYCLINE SCH MLS/HR: 50 INJECTION, POWDER, LYOPHILIZED, FOR SOLUTION INTRAVENOUS at 11:28

## 2019-06-01 RX ADMIN — DOCUSATE SODIUM SCH MG: 100 CAPSULE, LIQUID FILLED ORAL at 21:02

## 2019-06-01 RX ADMIN — HYDROXYZINE HYDROCHLORIDE PRN MG: 10 TABLET ORAL at 21:03

## 2019-06-01 RX ADMIN — GUAIFENESIN AND DEXTROMETHORPHAN PRN ML: 100; 10 SYRUP ORAL at 11:28

## 2019-06-01 RX ADMIN — DOCUSATE SODIUM SCH MG: 100 CAPSULE, LIQUID FILLED ORAL at 08:14

## 2019-06-01 RX ADMIN — MAGNESIUM OXIDE SCH MG: 400 TABLET ORAL at 21:03

## 2019-06-01 RX ADMIN — MICAFUNGIN SODIUM SCH MLS/HR: 20 INJECTION, POWDER, LYOPHILIZED, FOR SOLUTION INTRAVENOUS at 08:28

## 2019-06-01 RX ADMIN — LORAZEPAM PRN MG: 2 INJECTION INTRAMUSCULAR; INTRAVENOUS at 21:20

## 2019-06-02 VITALS — DIASTOLIC BLOOD PRESSURE: 66 MMHG | SYSTOLIC BLOOD PRESSURE: 104 MMHG

## 2019-06-02 VITALS — SYSTOLIC BLOOD PRESSURE: 117 MMHG | DIASTOLIC BLOOD PRESSURE: 56 MMHG

## 2019-06-02 VITALS — SYSTOLIC BLOOD PRESSURE: 124 MMHG | DIASTOLIC BLOOD PRESSURE: 66 MMHG

## 2019-06-02 VITALS — SYSTOLIC BLOOD PRESSURE: 111 MMHG | DIASTOLIC BLOOD PRESSURE: 67 MMHG

## 2019-06-02 LAB
<PLATELET ESTIMATE>: (no result)
<PLT MORPHOLOGY>: (no result)
ALBUMIN SERPL-MCNC: 2.2 G/DL (ref 3.4–5)
ALP SERPL-CCNC: 119 U/L (ref 45–117)
ALT SERPL-CCNC: 13 U/L (ref 12–78)
ANION GAP SERPL CALC-SCNC: 7 MMOL/L (ref 5–15)
BAND#(MANUAL): 0.02 X10^3/UL
BILIRUB DIRECT SERPL-MCNC: NORMAL MG/DL
BILIRUB SERPL-MCNC: 0.3 MG/DL (ref 0.2–1)
CALCIUM SERPL-MCNC: 8.2 MG/DL (ref 8.5–10.1)
CHLORIDE SERPL-SCNC: 108 MMOL/L (ref 98–107)
CREAT SERPL-MCNC: 0.49 MG/DL (ref 0.55–1.02)
ERYTHROCYTE [DISTWIDTH] IN BLOOD BY AUTOMATED COUNT: 13 % (ref 9.6–15.2)
LYMPH#(MANUAL): 0.32 X10^3/UL (ref 1–3.4)
LYMPHS% (MANUAL): 40 % (ref 22–44)
MCH RBC QN AUTO: 30.8 PG (ref 27–34.8)
MCHC RBC AUTO-ENTMCNC: 34.1 G/DL (ref 32.4–35.8)
MCV RBC AUTO: 90.5 FL (ref 80–100)
MD: YES
MONOS#(MANUAL): 0.25 X10^3/UL (ref 0.3–2.7)
MONOS% (MANUAL): 31 % (ref 2–9)
NEUTS BAND NFR BLD: 3 % (ref 0–7)
NRBC % (MANUAL): 1 % (ref 0–1)
PLATELET # BLD AUTO: 53 X10^3/UL (ref 130–400)
PMV BLD AUTO: 8.6 FL (ref 7.4–10.4)
PROT SERPL-MCNC: 5.2 G/DL (ref 6.4–8.2)
RBC # BLD AUTO: 2.49 X10^6/UL (ref 3.82–5.3)
SEG#(MANUAL): 0.21 X10^3/UL (ref 1.8–6.8)
SEGS% (MANUAL): 26 % (ref 42–75)

## 2019-06-02 RX ADMIN — ERAVACYCLINE SCH MLS/HR: 50 INJECTION, POWDER, LYOPHILIZED, FOR SOLUTION INTRAVENOUS at 11:16

## 2019-06-02 RX ADMIN — MENTHOL, METHYL SALICYLATE SCH APPLIC: 10; 15 CREAM TOPICAL at 21:00

## 2019-06-02 RX ADMIN — DOCUSATE SODIUM SCH MG: 100 CAPSULE, LIQUID FILLED ORAL at 09:05

## 2019-06-02 RX ADMIN — TRIAMCINOLONE ACETONIDE PRN APPLIC: 1 CREAM TOPICAL at 21:15

## 2019-06-02 RX ADMIN — Medication SCH MG: at 09:05

## 2019-06-02 RX ADMIN — ERAVACYCLINE SCH MLS/HR: 50 INJECTION, POWDER, LYOPHILIZED, FOR SOLUTION INTRAVENOUS at 00:03

## 2019-06-02 RX ADMIN — MENTHOL, METHYL SALICYLATE SCH APPLIC: 10; 15 CREAM TOPICAL at 09:06

## 2019-06-02 RX ADMIN — ONDANSETRON PRN MG: 2 INJECTION, SOLUTION INTRAMUSCULAR; INTRAVENOUS at 11:17

## 2019-06-02 RX ADMIN — MICAFUNGIN SODIUM SCH MLS/HR: 20 INJECTION, POWDER, LYOPHILIZED, FOR SOLUTION INTRAVENOUS at 09:05

## 2019-06-02 RX ADMIN — PANTOPRAZOLE SODIUM SCH MG: 40 TABLET, DELAYED RELEASE ORAL at 04:55

## 2019-06-02 RX ADMIN — DOCUSATE SODIUM SCH MG: 100 CAPSULE, LIQUID FILLED ORAL at 21:00

## 2019-06-02 RX ADMIN — OXYCODONE HYDROCHLORIDE AND ACETAMINOPHEN PRN TAB: 7.5; 325 TABLET ORAL at 09:06

## 2019-06-02 RX ADMIN — GUAIFENESIN AND DEXTROMETHORPHAN PRN ML: 100; 10 SYRUP ORAL at 18:02

## 2019-06-02 RX ADMIN — LORAZEPAM PRN MG: 2 INJECTION INTRAMUSCULAR; INTRAVENOUS at 21:24

## 2019-06-02 RX ADMIN — MAGNESIUM OXIDE SCH MG: 400 TABLET ORAL at 09:06

## 2019-06-02 RX ADMIN — GUAIFENESIN AND DEXTROMETHORPHAN PRN ML: 100; 10 SYRUP ORAL at 04:55

## 2019-06-02 RX ADMIN — MENTHOL, METHYL SALICYLATE SCH APPLIC: 10; 15 CREAM TOPICAL at 15:59

## 2019-06-02 RX ADMIN — ERAVACYCLINE SCH MLS/HR: 50 INJECTION, POWDER, LYOPHILIZED, FOR SOLUTION INTRAVENOUS at 23:16

## 2019-06-02 RX ADMIN — ONDANSETRON PRN MG: 2 INJECTION, SOLUTION INTRAMUSCULAR; INTRAVENOUS at 04:57

## 2019-06-02 RX ADMIN — MAGNESIUM OXIDE SCH MG: 400 TABLET ORAL at 21:24

## 2019-06-02 RX ADMIN — OXYCODONE HYDROCHLORIDE AND ACETAMINOPHEN PRN TAB: 7.5; 325 TABLET ORAL at 15:21

## 2019-06-02 RX ADMIN — OXYCODONE HYDROCHLORIDE AND ACETAMINOPHEN PRN TAB: 7.5; 325 TABLET ORAL at 23:16

## 2019-06-02 RX ADMIN — POLYETHYLENE GLYCOL 3350 SCH GM: 17 POWDER, FOR SOLUTION ORAL at 09:05

## 2019-06-02 RX ADMIN — OXYCODONE HYDROCHLORIDE AND ACETAMINOPHEN PRN TAB: 7.5; 325 TABLET ORAL at 00:02

## 2019-06-02 RX ADMIN — OXYCODONE HYDROCHLORIDE AND ACETAMINOPHEN PRN TAB: 7.5; 325 TABLET ORAL at 04:55

## 2019-06-02 RX ADMIN — HYDROXYZINE HYDROCHLORIDE PRN MG: 10 TABLET ORAL at 21:24

## 2019-06-02 RX ADMIN — OXYCODONE HYDROCHLORIDE AND ACETAMINOPHEN PRN TAB: 7.5; 325 TABLET ORAL at 19:40

## 2019-06-03 VITALS — DIASTOLIC BLOOD PRESSURE: 78 MMHG | SYSTOLIC BLOOD PRESSURE: 115 MMHG

## 2019-06-03 VITALS — SYSTOLIC BLOOD PRESSURE: 119 MMHG | DIASTOLIC BLOOD PRESSURE: 75 MMHG

## 2019-06-03 VITALS — DIASTOLIC BLOOD PRESSURE: 77 MMHG | SYSTOLIC BLOOD PRESSURE: 117 MMHG

## 2019-06-03 VITALS — SYSTOLIC BLOOD PRESSURE: 126 MMHG | DIASTOLIC BLOOD PRESSURE: 75 MMHG

## 2019-06-03 LAB
<PLATELET ESTIMATE>: (no result)
<PLT MORPHOLOGY>: (no result)
ALBUMIN SERPL-MCNC: 2.1 G/DL (ref 3.4–5)
ALP SERPL-CCNC: 123 U/L (ref 45–117)
ALT SERPL-CCNC: 13 U/L (ref 12–78)
ANION GAP SERPL CALC-SCNC: 4 MMOL/L (ref 5–15)
BAND#(MANUAL): 0.03 X10^3/UL
BILIRUB SERPL-MCNC: 0.3 MG/DL (ref 0.2–1)
CALCIUM SERPL-MCNC: 8.3 MG/DL (ref 8.5–10.1)
CHLORIDE SERPL-SCNC: 109 MMOL/L (ref 98–107)
CREAT SERPL-MCNC: 0.48 MG/DL (ref 0.55–1.02)
ERYTHROCYTE [DISTWIDTH] IN BLOOD BY AUTOMATED COUNT: 13.6 % (ref 9.6–15.2)
LYMPH#(MANUAL): 0.36 X10^3/UL (ref 1–3.4)
LYMPHS% (MANUAL): 33 % (ref 22–44)
MCH RBC QN AUTO: 30.7 PG (ref 27–34.8)
MCHC RBC AUTO-ENTMCNC: 33.7 G/DL (ref 32.4–35.8)
MCV RBC AUTO: 91.2 FL (ref 80–100)
MD: YES
METAMYELOCYTES# (MANUAL): 0.04 X10^3/UL (ref 0–0)
METAMYELOCYTES% (MANUAL): 4 % (ref 0–1)
MONOS#(MANUAL): 0.18 X10^3/UL (ref 0.3–2.7)
MONOS% (MANUAL): 16 % (ref 2–9)
MYELOCYTES# (MANUAL): 0.01 X10^3/UL (ref 0–0)
MYELOCYTES% (MANUAL): 1 % (ref 0–0)
NEUTS BAND NFR BLD: 3 % (ref 0–7)
PLATELET # BLD AUTO: 66 X10^3/UL (ref 130–400)
PMV BLD AUTO: 7.8 FL (ref 7.4–10.4)
POLYCHROMASIA BLD QL SMEAR: (no result)
PROT SERPL-MCNC: 5 G/DL (ref 6.4–8.2)
RBC # BLD AUTO: 2.41 X10^6/UL (ref 3.82–5.3)
SEG#(MANUAL): 0.47 X10^3/UL (ref 1.8–6.8)
SEGS% (MANUAL): 43 % (ref 42–75)

## 2019-06-03 RX ADMIN — MENTHOL, METHYL SALICYLATE SCH APPLIC: 10; 15 CREAM TOPICAL at 17:14

## 2019-06-03 RX ADMIN — MENTHOL, METHYL SALICYLATE SCH APPLIC: 10; 15 CREAM TOPICAL at 08:30

## 2019-06-03 RX ADMIN — MAGNESIUM OXIDE SCH MG: 400 TABLET ORAL at 20:38

## 2019-06-03 RX ADMIN — LORAZEPAM PRN MG: 2 INJECTION INTRAMUSCULAR; INTRAVENOUS at 03:27

## 2019-06-03 RX ADMIN — DOCUSATE SODIUM SCH MG: 100 CAPSULE, LIQUID FILLED ORAL at 08:15

## 2019-06-03 RX ADMIN — OXYCODONE HYDROCHLORIDE AND ACETAMINOPHEN PRN TAB: 7.5; 325 TABLET ORAL at 12:26

## 2019-06-03 RX ADMIN — DOCUSATE SODIUM SCH MG: 100 CAPSULE, LIQUID FILLED ORAL at 20:38

## 2019-06-03 RX ADMIN — OXYCODONE HYDROCHLORIDE AND ACETAMINOPHEN PRN TAB: 7.5; 325 TABLET ORAL at 20:38

## 2019-06-03 RX ADMIN — MAGNESIUM OXIDE SCH MG: 400 TABLET ORAL at 08:30

## 2019-06-03 RX ADMIN — MENTHOL, METHYL SALICYLATE SCH APPLIC: 10; 15 CREAM TOPICAL at 20:42

## 2019-06-03 RX ADMIN — ONDANSETRON PRN MG: 2 INJECTION, SOLUTION INTRAMUSCULAR; INTRAVENOUS at 14:24

## 2019-06-03 RX ADMIN — Medication SCH MG: at 08:30

## 2019-06-03 RX ADMIN — PANTOPRAZOLE SODIUM SCH MG: 40 TABLET, DELAYED RELEASE ORAL at 05:21

## 2019-06-03 RX ADMIN — ERAVACYCLINE SCH MLS/HR: 50 INJECTION, POWDER, LYOPHILIZED, FOR SOLUTION INTRAVENOUS at 23:34

## 2019-06-03 RX ADMIN — POLYETHYLENE GLYCOL 3350 SCH GM: 17 POWDER, FOR SOLUTION ORAL at 08:16

## 2019-06-03 RX ADMIN — ERAVACYCLINE SCH MLS/HR: 50 INJECTION, POWDER, LYOPHILIZED, FOR SOLUTION INTRAVENOUS at 12:16

## 2019-06-03 RX ADMIN — OXYCODONE HYDROCHLORIDE AND ACETAMINOPHEN PRN TAB: 7.5; 325 TABLET ORAL at 03:27

## 2019-06-03 RX ADMIN — MICAFUNGIN SODIUM SCH MLS/HR: 20 INJECTION, POWDER, LYOPHILIZED, FOR SOLUTION INTRAVENOUS at 08:30

## 2019-06-04 VITALS — DIASTOLIC BLOOD PRESSURE: 75 MMHG | SYSTOLIC BLOOD PRESSURE: 113 MMHG

## 2019-06-04 VITALS — DIASTOLIC BLOOD PRESSURE: 64 MMHG | SYSTOLIC BLOOD PRESSURE: 94 MMHG

## 2019-06-04 VITALS — SYSTOLIC BLOOD PRESSURE: 122 MMHG | DIASTOLIC BLOOD PRESSURE: 85 MMHG

## 2019-06-04 VITALS — DIASTOLIC BLOOD PRESSURE: 82 MMHG | SYSTOLIC BLOOD PRESSURE: 123 MMHG

## 2019-06-04 LAB
<PLATELET ESTIMATE>: (no result)
<PLT MORPHOLOGY>: (no result)
ALBUMIN SERPL-MCNC: 2.1 G/DL (ref 3.4–5)
ALP SERPL-CCNC: 129 U/L (ref 45–117)
ALT SERPL-CCNC: 13 U/L (ref 12–78)
ANION GAP SERPL CALC-SCNC: 5 MMOL/L (ref 5–15)
BAND#(MANUAL): 0.11 X10^3/UL
BILIRUB SERPL-MCNC: 0.2 MG/DL (ref 0.2–1)
CALCIUM SERPL-MCNC: 8 MG/DL (ref 8.5–10.1)
CHLORIDE SERPL-SCNC: 107 MMOL/L (ref 98–107)
CREAT SERPL-MCNC: 0.53 MG/DL (ref 0.55–1.02)
ERYTHROCYTE [DISTWIDTH] IN BLOOD BY AUTOMATED COUNT: 13.5 % (ref 9.6–15.2)
LYMPH#(MANUAL): 0.48 X10^3/UL (ref 1–3.4)
LYMPHS% (MANUAL): 30 % (ref 22–44)
MCH RBC QN AUTO: 30.8 PG (ref 27–34.8)
MCHC RBC AUTO-ENTMCNC: 33.9 G/DL (ref 32.4–35.8)
MCV RBC AUTO: 90.8 FL (ref 80–100)
MD: YES
METAMYELOCYTES# (MANUAL): 0.05 X10^3/UL (ref 0–0)
METAMYELOCYTES% (MANUAL): 3 % (ref 0–1)
MONOS#(MANUAL): 0.29 X10^3/UL (ref 0.3–2.7)
MONOS% (MANUAL): 18 % (ref 2–9)
MYELOCYTES# (MANUAL): 0.02 X10^3/UL (ref 0–0)
MYELOCYTES% (MANUAL): 1 % (ref 0–0)
NEUTS BAND NFR BLD: 7 % (ref 0–7)
NRBC % (MANUAL): 2 % (ref 0–1)
PLATELET # BLD AUTO: 94 X10^3/UL (ref 130–400)
PMV BLD AUTO: 7.8 FL (ref 7.4–10.4)
POLYCHROMASIA BLD QL SMEAR: (no result)
PROT SERPL-MCNC: 4.9 G/DL (ref 6.4–8.2)
RBC # BLD AUTO: 2.58 X10^6/UL (ref 3.82–5.3)
SEG#(MANUAL): 0.66 X10^3/UL (ref 1.8–6.8)
SEGS% (MANUAL): 41 % (ref 42–75)

## 2019-06-04 RX ADMIN — MENTHOL, METHYL SALICYLATE SCH APPLIC: 10; 15 CREAM TOPICAL at 17:46

## 2019-06-04 RX ADMIN — POLYETHYLENE GLYCOL 3350 SCH GM: 17 POWDER, FOR SOLUTION ORAL at 07:40

## 2019-06-04 RX ADMIN — METHOCARBAMOL PRN MG: 500 TABLET ORAL at 07:40

## 2019-06-04 RX ADMIN — ONDANSETRON PRN MG: 2 INJECTION, SOLUTION INTRAMUSCULAR; INTRAVENOUS at 07:36

## 2019-06-04 RX ADMIN — MENTHOL, METHYL SALICYLATE SCH APPLIC: 10; 15 CREAM TOPICAL at 07:43

## 2019-06-04 RX ADMIN — OXYCODONE HYDROCHLORIDE AND ACETAMINOPHEN PRN TAB: 7.5; 325 TABLET ORAL at 13:53

## 2019-06-04 RX ADMIN — OXYCODONE HYDROCHLORIDE AND ACETAMINOPHEN PRN TAB: 7.5; 325 TABLET ORAL at 20:50

## 2019-06-04 RX ADMIN — MICAFUNGIN SODIUM SCH MLS/HR: 20 INJECTION, POWDER, LYOPHILIZED, FOR SOLUTION INTRAVENOUS at 09:42

## 2019-06-04 RX ADMIN — MAGNESIUM OXIDE SCH MG: 400 TABLET ORAL at 07:40

## 2019-06-04 RX ADMIN — ERAVACYCLINE SCH MLS/HR: 50 INJECTION, POWDER, LYOPHILIZED, FOR SOLUTION INTRAVENOUS at 23:11

## 2019-06-04 RX ADMIN — MENTHOL, METHYL SALICYLATE SCH APPLIC: 10; 15 CREAM TOPICAL at 20:50

## 2019-06-04 RX ADMIN — GUAIFENESIN AND DEXTROMETHORPHAN PRN ML: 100; 10 SYRUP ORAL at 13:53

## 2019-06-04 RX ADMIN — Medication SCH MG: at 07:40

## 2019-06-04 RX ADMIN — MAGNESIUM OXIDE SCH MG: 400 TABLET ORAL at 20:50

## 2019-06-04 RX ADMIN — METHOCARBAMOL PRN MG: 500 TABLET ORAL at 23:11

## 2019-06-04 RX ADMIN — DOCUSATE SODIUM SCH MG: 100 CAPSULE, LIQUID FILLED ORAL at 07:40

## 2019-06-04 RX ADMIN — DOCUSATE SODIUM SCH MG: 100 CAPSULE, LIQUID FILLED ORAL at 20:50

## 2019-06-04 RX ADMIN — PANTOPRAZOLE SODIUM SCH MG: 40 TABLET, DELAYED RELEASE ORAL at 05:54

## 2019-06-04 RX ADMIN — ERAVACYCLINE SCH MLS/HR: 50 INJECTION, POWDER, LYOPHILIZED, FOR SOLUTION INTRAVENOUS at 12:04

## 2019-06-05 VITALS — DIASTOLIC BLOOD PRESSURE: 75 MMHG | SYSTOLIC BLOOD PRESSURE: 118 MMHG

## 2019-06-05 VITALS — DIASTOLIC BLOOD PRESSURE: 79 MMHG | SYSTOLIC BLOOD PRESSURE: 134 MMHG

## 2019-06-05 VITALS — DIASTOLIC BLOOD PRESSURE: 81 MMHG | SYSTOLIC BLOOD PRESSURE: 118 MMHG

## 2019-06-05 LAB
<PLATELET ESTIMATE>: (no result)
<PLT MORPHOLOGY>: (no result)
ALBUMIN SERPL-MCNC: 2 G/DL (ref 3.4–5)
ALP SERPL-CCNC: 140 U/L (ref 45–117)
ALT SERPL-CCNC: 10 U/L (ref 12–78)
ANION GAP SERPL CALC-SCNC: 7 MMOL/L (ref 5–15)
BILIRUB SERPL-MCNC: 0.2 MG/DL (ref 0.2–1)
CALCIUM SERPL-MCNC: 8.1 MG/DL (ref 8.5–10.1)
CHLORIDE SERPL-SCNC: 105 MMOL/L (ref 98–107)
CREAT SERPL-MCNC: 0.6 MG/DL (ref 0.55–1.02)
ERYTHROCYTE [DISTWIDTH] IN BLOOD BY AUTOMATED COUNT: 13 % (ref 9.6–15.2)
LYMPH#(MANUAL): 0.36 X10^3/UL (ref 1–3.4)
LYMPHS% (MANUAL): 17 % (ref 22–44)
MCH RBC QN AUTO: 30.4 PG (ref 27–34.8)
MCHC RBC AUTO-ENTMCNC: 33.8 G/DL (ref 32.4–35.8)
MCV RBC AUTO: 90.1 FL (ref 80–100)
MD: YES
METAMYELOCYTES# (MANUAL): 0.08 X10^3/UL (ref 0–0)
METAMYELOCYTES% (MANUAL): 4 % (ref 0–1)
MONOS#(MANUAL): 0.38 X10^3/UL (ref 0.3–2.7)
MONOS% (MANUAL): 18 % (ref 2–9)
MYELOCYTES# (MANUAL): 0.06 X10^3/UL (ref 0–0)
MYELOCYTES% (MANUAL): 3 % (ref 0–0)
PLATELET # BLD AUTO: 120 X10^3/UL (ref 130–400)
PMV BLD AUTO: 7.6 FL (ref 7.4–10.4)
POLYCHROMASIA BLD QL SMEAR: (no result)
PROT SERPL-MCNC: 4.8 G/DL (ref 6.4–8.2)
RBC # BLD AUTO: 2.49 X10^6/UL (ref 3.82–5.3)
SEG#(MANUAL): 1.22 X10^3/UL (ref 1.8–6.8)
SEGS% (MANUAL): 58 % (ref 42–75)

## 2019-06-05 RX ADMIN — MENTHOL, METHYL SALICYLATE SCH APPLIC: 10; 15 CREAM TOPICAL at 08:19

## 2019-06-05 RX ADMIN — OXYCODONE HYDROCHLORIDE AND ACETAMINOPHEN PRN TAB: 7.5; 325 TABLET ORAL at 05:00

## 2019-06-05 RX ADMIN — MAGNESIUM OXIDE SCH MG: 400 TABLET ORAL at 08:19

## 2019-06-05 RX ADMIN — PANTOPRAZOLE SODIUM SCH MG: 40 TABLET, DELAYED RELEASE ORAL at 05:00

## 2019-06-05 RX ADMIN — POLYETHYLENE GLYCOL 3350 SCH GM: 17 POWDER, FOR SOLUTION ORAL at 08:19

## 2019-06-05 RX ADMIN — GUAIFENESIN AND DEXTROMETHORPHAN PRN ML: 100; 10 SYRUP ORAL at 10:45

## 2019-06-05 RX ADMIN — ERAVACYCLINE SCH MLS/HR: 50 INJECTION, POWDER, LYOPHILIZED, FOR SOLUTION INTRAVENOUS at 12:04

## 2019-06-05 RX ADMIN — ONDANSETRON PRN MG: 2 INJECTION, SOLUTION INTRAMUSCULAR; INTRAVENOUS at 08:19

## 2019-06-05 RX ADMIN — DOCUSATE SODIUM SCH MG: 100 CAPSULE, LIQUID FILLED ORAL at 08:19

## 2019-06-05 RX ADMIN — OXYCODONE HYDROCHLORIDE AND ACETAMINOPHEN PRN TAB: 7.5; 325 TABLET ORAL at 14:11

## 2019-06-05 RX ADMIN — ONDANSETRON PRN MG: 2 INJECTION, SOLUTION INTRAMUSCULAR; INTRAVENOUS at 14:11

## 2019-06-05 RX ADMIN — Medication SCH MG: at 08:19

## 2019-06-05 RX ADMIN — OXYCODONE HYDROCHLORIDE AND ACETAMINOPHEN PRN TAB: 7.5; 325 TABLET ORAL at 09:38

## 2019-06-24 ENCOUNTER — HOSPITAL ENCOUNTER (OUTPATIENT)
Dept: HOSPITAL 8 - CFH | Age: 59
Discharge: HOME | End: 2019-06-24
Attending: INTERNAL MEDICINE
Payer: COMMERCIAL

## 2019-06-24 DIAGNOSIS — Z12.31: Primary | ICD-10-CM

## 2019-07-08 ENCOUNTER — HOSPITAL ENCOUNTER (INPATIENT)
Dept: HOSPITAL 8 - 3NW | Age: 59
LOS: 5 days | Discharge: HOME | DRG: 839 | End: 2019-07-13
Attending: INTERNAL MEDICINE | Admitting: INTERNAL MEDICINE
Payer: COMMERCIAL

## 2019-07-08 VITALS — BODY MASS INDEX: 29.84 KG/M2 | WEIGHT: 168.43 LBS | HEIGHT: 63 IN

## 2019-07-08 VITALS — DIASTOLIC BLOOD PRESSURE: 57 MMHG | SYSTOLIC BLOOD PRESSURE: 118 MMHG

## 2019-07-08 VITALS — DIASTOLIC BLOOD PRESSURE: 66 MMHG | SYSTOLIC BLOOD PRESSURE: 102 MMHG

## 2019-07-08 VITALS — SYSTOLIC BLOOD PRESSURE: 127 MMHG | DIASTOLIC BLOOD PRESSURE: 69 MMHG

## 2019-07-08 DIAGNOSIS — C92.01: ICD-10-CM

## 2019-07-08 DIAGNOSIS — Z85.72: ICD-10-CM

## 2019-07-08 DIAGNOSIS — Z88.8: ICD-10-CM

## 2019-07-08 DIAGNOSIS — Z51.11: Primary | ICD-10-CM

## 2019-07-08 DIAGNOSIS — Z85.3: ICD-10-CM

## 2019-07-08 DIAGNOSIS — Z88.2: ICD-10-CM

## 2019-07-08 DIAGNOSIS — Z88.0: ICD-10-CM

## 2019-07-08 DIAGNOSIS — D75.9: ICD-10-CM

## 2019-07-08 LAB
ANION GAP SERPL CALC-SCNC: 8 MMOL/L (ref 5–15)
BASOPHILS # BLD AUTO: 0.06 X10^3/UL (ref 0–0.1)
BASOPHILS NFR BLD AUTO: 1 % (ref 0–1)
CALCIUM SERPL-MCNC: 9.2 MG/DL (ref 8.5–10.1)
CHLORIDE SERPL-SCNC: 110 MMOL/L (ref 98–107)
CREAT SERPL-MCNC: 0.69 MG/DL (ref 0.55–1.02)
EOSINOPHIL # BLD AUTO: 0.24 X10^3/UL (ref 0–0.4)
EOSINOPHIL NFR BLD AUTO: 5 % (ref 1–7)
ERYTHROCYTE [DISTWIDTH] IN BLOOD BY AUTOMATED COUNT: 19 % (ref 9.6–15.2)
LYMPHOCYTES # BLD AUTO: 0.54 X10^3/UL (ref 1–3.4)
LYMPHOCYTES NFR BLD AUTO: 11 % (ref 22–44)
MCH RBC QN AUTO: 31.2 PG (ref 27–34.8)
MCHC RBC AUTO-ENTMCNC: 32.3 G/DL (ref 32.4–35.8)
MCV RBC AUTO: 96.5 FL (ref 80–100)
MD: NO
MONOCYTES # BLD AUTO: 0.35 X10^3/UL (ref 0.2–0.8)
MONOCYTES NFR BLD AUTO: 7 % (ref 2–9)
NEUTROPHILS # BLD AUTO: 3.79 X10^3/UL (ref 1.8–6.8)
NEUTROPHILS NFR BLD AUTO: 76 % (ref 42–75)
PLATELET # BLD AUTO: 279 X10^3/UL (ref 130–400)
PMV BLD AUTO: 7.3 FL (ref 7.4–10.4)
RBC # BLD AUTO: 3.66 X10^6/UL (ref 3.82–5.3)

## 2019-07-08 PROCEDURE — 36573 INSJ PICC RS&I 5 YR+: CPT

## 2019-07-08 PROCEDURE — 85025 COMPLETE CBC W/AUTO DIFF WBC: CPT

## 2019-07-08 PROCEDURE — 80048 BASIC METABOLIC PNL TOTAL CA: CPT

## 2019-07-08 PROCEDURE — 02HV33Z INSERTION OF INFUSION DEVICE INTO SUPERIOR VENA CAVA, PERCUTANEOUS APPROACH: ICD-10-PCS | Performed by: RADIOLOGY

## 2019-07-08 PROCEDURE — 80053 COMPREHEN METABOLIC PANEL: CPT

## 2019-07-08 PROCEDURE — C1751 CATH, INF, PER/CENT/MIDLINE: HCPCS

## 2019-07-08 PROCEDURE — 83735 ASSAY OF MAGNESIUM: CPT

## 2019-07-08 PROCEDURE — B5181ZA FLUOROSCOPY OF SUPERIOR VENA CAVA USING LOW OSMOLAR CONTRAST, GUIDANCE: ICD-10-PCS | Performed by: RADIOLOGY

## 2019-07-08 PROCEDURE — 83615 LACTATE (LD) (LDH) ENZYME: CPT

## 2019-07-08 PROCEDURE — 36415 COLL VENOUS BLD VENIPUNCTURE: CPT

## 2019-07-08 PROCEDURE — B548ZZA ULTRASONOGRAPHY OF SUPERIOR VENA CAVA, GUIDANCE: ICD-10-PCS | Performed by: RADIOLOGY

## 2019-07-08 RX ADMIN — CYTARABINE SCH MLS/HR: 100 INJECTION, SOLUTION INTRATHECAL; INTRAVENOUS; SUBCUTANEOUS at 15:41

## 2019-07-08 RX ADMIN — SODIUM CHLORIDE SCH MLS/HR: 0.9 INJECTION, SOLUTION INTRAVENOUS at 14:20

## 2019-07-08 RX ADMIN — DEXAMETHASONE SCH DROP: 1 SUSPENSION OPHTHALMIC at 14:19

## 2019-07-08 RX ADMIN — DEXAMETHASONE SCH DROP: 1 SUSPENSION OPHTHALMIC at 18:39

## 2019-07-09 VITALS — DIASTOLIC BLOOD PRESSURE: 76 MMHG | SYSTOLIC BLOOD PRESSURE: 129 MMHG

## 2019-07-09 VITALS — SYSTOLIC BLOOD PRESSURE: 138 MMHG | DIASTOLIC BLOOD PRESSURE: 69 MMHG

## 2019-07-09 VITALS — SYSTOLIC BLOOD PRESSURE: 106 MMHG | DIASTOLIC BLOOD PRESSURE: 51 MMHG

## 2019-07-09 VITALS — DIASTOLIC BLOOD PRESSURE: 60 MMHG | SYSTOLIC BLOOD PRESSURE: 114 MMHG

## 2019-07-09 LAB
ALBUMIN SERPL-MCNC: 3.1 G/DL (ref 3.4–5)
ALP SERPL-CCNC: 86 U/L (ref 45–117)
ALT SERPL-CCNC: 17 U/L (ref 12–78)
ANION GAP SERPL CALC-SCNC: 7 MMOL/L (ref 5–15)
BASOPHILS # BLD AUTO: 0.02 X10^3/UL (ref 0–0.1)
BASOPHILS NFR BLD AUTO: 0 % (ref 0–1)
BILIRUB SERPL-MCNC: 0.2 MG/DL (ref 0.2–1)
CALCIUM SERPL-MCNC: 9.2 MG/DL (ref 8.5–10.1)
CHLORIDE SERPL-SCNC: 112 MMOL/L (ref 98–107)
CREAT SERPL-MCNC: 0.59 MG/DL (ref 0.55–1.02)
EOSINOPHIL # BLD AUTO: 0 X10^3/UL (ref 0–0.4)
EOSINOPHIL NFR BLD AUTO: 0 % (ref 1–7)
ERYTHROCYTE [DISTWIDTH] IN BLOOD BY AUTOMATED COUNT: 19.4 % (ref 9.6–15.2)
LYMPHOCYTES # BLD AUTO: 0.28 X10^3/UL (ref 1–3.4)
LYMPHOCYTES NFR BLD AUTO: 6 % (ref 22–44)
MCH RBC QN AUTO: 32.3 PG (ref 27–34.8)
MCHC RBC AUTO-ENTMCNC: 32.6 G/DL (ref 32.4–35.8)
MCV RBC AUTO: 99.1 FL (ref 80–100)
MD: NO
MONOCYTES # BLD AUTO: 0.15 X10^3/UL (ref 0.2–0.8)
MONOCYTES NFR BLD AUTO: 3 % (ref 2–9)
NEUTROPHILS # BLD AUTO: 4.41 X10^3/UL (ref 1.8–6.8)
NEUTROPHILS NFR BLD AUTO: 91 % (ref 42–75)
PLATELET # BLD AUTO: 246 X10^3/UL (ref 130–400)
PMV BLD AUTO: 8 FL (ref 7.4–10.4)
PROT SERPL-MCNC: 6.1 G/DL (ref 6.4–8.2)
RBC # BLD AUTO: 3.28 X10^6/UL (ref 3.82–5.3)

## 2019-07-09 RX ADMIN — SODIUM CHLORIDE SCH MLS/HR: 0.9 INJECTION, SOLUTION INTRAVENOUS at 23:42

## 2019-07-09 RX ADMIN — PANTOPRAZOLE SODIUM SCH MG: 40 TABLET, DELAYED RELEASE ORAL at 17:06

## 2019-07-09 RX ADMIN — PANTOPRAZOLE SODIUM SCH MG: 40 TABLET, DELAYED RELEASE ORAL at 08:49

## 2019-07-09 RX ADMIN — DEXAMETHASONE SCH DROP: 1 SUSPENSION OPHTHALMIC at 00:32

## 2019-07-09 RX ADMIN — CYTARABINE SCH MLS/HR: 100 INJECTION, SOLUTION INTRATHECAL; INTRAVENOUS; SUBCUTANEOUS at 03:13

## 2019-07-09 RX ADMIN — MAGNESIUM OXIDE SCH MG: 400 TABLET ORAL at 08:49

## 2019-07-09 RX ADMIN — DEXAMETHASONE SCH DROP: 1 SUSPENSION OPHTHALMIC at 11:23

## 2019-07-09 RX ADMIN — SODIUM CHLORIDE SCH MLS/HR: 0.9 INJECTION, SOLUTION INTRAVENOUS at 15:06

## 2019-07-09 RX ADMIN — CHOLECALCIFEROL TAB 125 MCG (5000 UNIT) SCH UNIT: 125 TAB at 08:49

## 2019-07-09 RX ADMIN — DEXAMETHASONE SCH DROP: 1 SUSPENSION OPHTHALMIC at 17:06

## 2019-07-09 RX ADMIN — DEXAMETHASONE SCH DROP: 1 SUSPENSION OPHTHALMIC at 20:30

## 2019-07-09 RX ADMIN — Medication SCH MG: at 08:49

## 2019-07-09 RX ADMIN — DEXAMETHASONE SCH DROP: 1 SUSPENSION OPHTHALMIC at 05:38

## 2019-07-09 RX ADMIN — SODIUM CHLORIDE SCH MLS/HR: 0.9 INJECTION, SOLUTION INTRAVENOUS at 00:32

## 2019-07-09 RX ADMIN — ENOXAPARIN SODIUM SCH MG: 40 INJECTION SUBCUTANEOUS at 11:23

## 2019-07-10 VITALS — DIASTOLIC BLOOD PRESSURE: 67 MMHG | SYSTOLIC BLOOD PRESSURE: 99 MMHG

## 2019-07-10 VITALS — DIASTOLIC BLOOD PRESSURE: 69 MMHG | SYSTOLIC BLOOD PRESSURE: 131 MMHG

## 2019-07-10 VITALS — DIASTOLIC BLOOD PRESSURE: 49 MMHG | SYSTOLIC BLOOD PRESSURE: 100 MMHG

## 2019-07-10 VITALS — DIASTOLIC BLOOD PRESSURE: 61 MMHG | SYSTOLIC BLOOD PRESSURE: 102 MMHG

## 2019-07-10 LAB
ALBUMIN SERPL-MCNC: 2.9 G/DL (ref 3.4–5)
ALP SERPL-CCNC: 81 U/L (ref 45–117)
ALT SERPL-CCNC: 16 U/L (ref 12–78)
ANION GAP SERPL CALC-SCNC: 7 MMOL/L (ref 5–15)
BASOPHILS # BLD AUTO: 0.01 X10^3/UL (ref 0–0.1)
BASOPHILS NFR BLD AUTO: 0 % (ref 0–1)
BILIRUB SERPL-MCNC: 0.8 MG/DL (ref 0.2–1)
CALCIUM SERPL-MCNC: 8.7 MG/DL (ref 8.5–10.1)
CHLORIDE SERPL-SCNC: 114 MMOL/L (ref 98–107)
CREAT SERPL-MCNC: 0.55 MG/DL (ref 0.55–1.02)
EOSINOPHIL # BLD AUTO: 0.1 X10^3/UL (ref 0–0.4)
EOSINOPHIL NFR BLD AUTO: 3 % (ref 1–7)
ERYTHROCYTE [DISTWIDTH] IN BLOOD BY AUTOMATED COUNT: 18.5 % (ref 9.6–15.2)
LYMPHOCYTES # BLD AUTO: 0.34 X10^3/UL (ref 1–3.4)
LYMPHOCYTES NFR BLD AUTO: 10 % (ref 22–44)
MCH RBC QN AUTO: 32 PG (ref 27–34.8)
MCHC RBC AUTO-ENTMCNC: 32.6 G/DL (ref 32.4–35.8)
MCV RBC AUTO: 98.2 FL (ref 80–100)
MD: NO
MONOCYTES # BLD AUTO: 0.18 X10^3/UL (ref 0.2–0.8)
MONOCYTES NFR BLD AUTO: 5 % (ref 2–9)
NEUTROPHILS # BLD AUTO: 2.66 X10^3/UL (ref 1.8–6.8)
NEUTROPHILS NFR BLD AUTO: 81 % (ref 42–75)
PLATELET # BLD AUTO: 207 X10^3/UL (ref 130–400)
PMV BLD AUTO: 7.7 FL (ref 7.4–10.4)
PROT SERPL-MCNC: 5.7 G/DL (ref 6.4–8.2)
RBC # BLD AUTO: 3.22 X10^6/UL (ref 3.82–5.3)

## 2019-07-10 RX ADMIN — Medication SCH MG: at 12:16

## 2019-07-10 RX ADMIN — DEXAMETHASONE SCH DROP: 1 SUSPENSION OPHTHALMIC at 20:25

## 2019-07-10 RX ADMIN — CHOLECALCIFEROL TAB 125 MCG (5000 UNIT) SCH UNIT: 125 TAB at 10:09

## 2019-07-10 RX ADMIN — DEXAMETHASONE SCH DROP: 1 SUSPENSION OPHTHALMIC at 16:51

## 2019-07-10 RX ADMIN — SODIUM CHLORIDE SCH MLS/HR: 0.9 INJECTION, SOLUTION INTRAVENOUS at 11:11

## 2019-07-10 RX ADMIN — PANTOPRAZOLE SODIUM SCH MG: 40 TABLET, DELAYED RELEASE ORAL at 16:51

## 2019-07-10 RX ADMIN — Medication SCH MG: at 10:09

## 2019-07-10 RX ADMIN — SODIUM CHLORIDE SCH MLS/HR: 0.9 INJECTION, SOLUTION INTRAVENOUS at 20:26

## 2019-07-10 RX ADMIN — MAGNESIUM OXIDE SCH MG: 400 TABLET ORAL at 10:09

## 2019-07-10 RX ADMIN — DEXAMETHASONE SCH DROP: 1 SUSPENSION OPHTHALMIC at 10:11

## 2019-07-10 RX ADMIN — PANTOPRAZOLE SODIUM SCH MG: 40 TABLET, DELAYED RELEASE ORAL at 05:40

## 2019-07-10 RX ADMIN — CYTARABINE SCH MLS/HR: 100 INJECTION, SOLUTION INTRATHECAL; INTRAVENOUS; SUBCUTANEOUS at 15:40

## 2019-07-10 RX ADMIN — ENOXAPARIN SODIUM SCH MG: 40 INJECTION SUBCUTANEOUS at 10:09

## 2019-07-10 RX ADMIN — DEXAMETHASONE SCH DROP: 1 SUSPENSION OPHTHALMIC at 05:40

## 2019-07-11 VITALS — SYSTOLIC BLOOD PRESSURE: 130 MMHG | DIASTOLIC BLOOD PRESSURE: 72 MMHG

## 2019-07-11 VITALS — DIASTOLIC BLOOD PRESSURE: 54 MMHG | SYSTOLIC BLOOD PRESSURE: 91 MMHG

## 2019-07-11 VITALS — DIASTOLIC BLOOD PRESSURE: 62 MMHG | SYSTOLIC BLOOD PRESSURE: 125 MMHG

## 2019-07-11 VITALS — SYSTOLIC BLOOD PRESSURE: 106 MMHG | DIASTOLIC BLOOD PRESSURE: 46 MMHG

## 2019-07-11 LAB
ALBUMIN SERPL-MCNC: 3 G/DL (ref 3.4–5)
ALP SERPL-CCNC: 95 U/L (ref 45–117)
ALT SERPL-CCNC: 18 U/L (ref 12–78)
ANION GAP SERPL CALC-SCNC: 8 MMOL/L (ref 5–15)
BASOPHILS # BLD AUTO: 0.01 X10^3/UL (ref 0–0.1)
BASOPHILS NFR BLD AUTO: 0 % (ref 0–1)
BILIRUB SERPL-MCNC: 0.3 MG/DL (ref 0.2–1)
CALCIUM SERPL-MCNC: 8.8 MG/DL (ref 8.5–10.1)
CHLORIDE SERPL-SCNC: 111 MMOL/L (ref 98–107)
CREAT SERPL-MCNC: 0.6 MG/DL (ref 0.55–1.02)
EOSINOPHIL # BLD AUTO: 0 X10^3/UL (ref 0–0.4)
EOSINOPHIL NFR BLD AUTO: 0 % (ref 1–7)
ERYTHROCYTE [DISTWIDTH] IN BLOOD BY AUTOMATED COUNT: 17.7 % (ref 9.6–15.2)
LYMPHOCYTES # BLD AUTO: 0.13 X10^3/UL (ref 1–3.4)
LYMPHOCYTES NFR BLD AUTO: 3 % (ref 22–44)
MCH RBC QN AUTO: 32 PG (ref 27–34.8)
MCHC RBC AUTO-ENTMCNC: 32.8 G/DL (ref 32.4–35.8)
MCV RBC AUTO: 97.6 FL (ref 80–100)
MD: (no result)
MONOCYTES # BLD AUTO: 0.09 X10^3/UL (ref 0.2–0.8)
MONOCYTES NFR BLD AUTO: 2 % (ref 2–9)
NEUTROPHILS # BLD AUTO: 4.39 X10^3/UL (ref 1.8–6.8)
NEUTROPHILS NFR BLD AUTO: 95 % (ref 42–75)
PLATELET # BLD AUTO: 219 X10^3/UL (ref 130–400)
PMV BLD AUTO: 7.7 FL (ref 7.4–10.4)
PROT SERPL-MCNC: 5.9 G/DL (ref 6.4–8.2)
RBC # BLD AUTO: 3.28 X10^6/UL (ref 3.82–5.3)

## 2019-07-11 RX ADMIN — DEXAMETHASONE SCH DROP: 1 SUSPENSION OPHTHALMIC at 21:09

## 2019-07-11 RX ADMIN — Medication SCH MG: at 08:12

## 2019-07-11 RX ADMIN — PANTOPRAZOLE SODIUM SCH MG: 40 TABLET, DELAYED RELEASE ORAL at 16:28

## 2019-07-11 RX ADMIN — ENOXAPARIN SODIUM SCH MG: 40 INJECTION SUBCUTANEOUS at 08:12

## 2019-07-11 RX ADMIN — MAGNESIUM OXIDE SCH MG: 400 TABLET ORAL at 08:13

## 2019-07-11 RX ADMIN — CHOLECALCIFEROL TAB 125 MCG (5000 UNIT) SCH UNIT: 125 TAB at 08:12

## 2019-07-11 RX ADMIN — DEXAMETHASONE SCH DROP: 1 SUSPENSION OPHTHALMIC at 11:41

## 2019-07-11 RX ADMIN — DEXAMETHASONE SCH DROP: 1 SUSPENSION OPHTHALMIC at 06:13

## 2019-07-11 RX ADMIN — DEXAMETHASONE SCH DROP: 1 SUSPENSION OPHTHALMIC at 16:28

## 2019-07-11 RX ADMIN — PANTOPRAZOLE SODIUM SCH MG: 40 TABLET, DELAYED RELEASE ORAL at 06:13

## 2019-07-11 RX ADMIN — SODIUM CHLORIDE SCH MLS/HR: 0.9 INJECTION, SOLUTION INTRAVENOUS at 16:28

## 2019-07-11 RX ADMIN — SODIUM CHLORIDE SCH MLS/HR: 0.9 INJECTION, SOLUTION INTRAVENOUS at 06:29

## 2019-07-11 RX ADMIN — CYTARABINE SCH MLS/HR: 100 INJECTION, SOLUTION INTRATHECAL; INTRAVENOUS; SUBCUTANEOUS at 03:03

## 2019-07-12 VITALS — DIASTOLIC BLOOD PRESSURE: 53 MMHG | SYSTOLIC BLOOD PRESSURE: 103 MMHG

## 2019-07-12 VITALS — DIASTOLIC BLOOD PRESSURE: 66 MMHG | SYSTOLIC BLOOD PRESSURE: 104 MMHG

## 2019-07-12 VITALS — SYSTOLIC BLOOD PRESSURE: 121 MMHG | DIASTOLIC BLOOD PRESSURE: 65 MMHG

## 2019-07-12 VITALS — SYSTOLIC BLOOD PRESSURE: 113 MMHG | DIASTOLIC BLOOD PRESSURE: 50 MMHG

## 2019-07-12 LAB
ALBUMIN SERPL-MCNC: 2.7 G/DL (ref 3.4–5)
ALP SERPL-CCNC: 79 U/L (ref 45–117)
ALT SERPL-CCNC: 41 U/L (ref 12–78)
ANION GAP SERPL CALC-SCNC: 5 MMOL/L (ref 5–15)
BASOPHILS # BLD AUTO: 0.01 X10^3/UL (ref 0–0.1)
BASOPHILS NFR BLD AUTO: 0 % (ref 0–1)
BILIRUB SERPL-MCNC: 0.4 MG/DL (ref 0.2–1)
CALCIUM SERPL-MCNC: 8.1 MG/DL (ref 8.5–10.1)
CHLORIDE SERPL-SCNC: 112 MMOL/L (ref 98–107)
CREAT SERPL-MCNC: 0.47 MG/DL (ref 0.55–1.02)
EOSINOPHIL # BLD AUTO: 0.09 X10^3/UL (ref 0–0.4)
EOSINOPHIL NFR BLD AUTO: 3 % (ref 1–7)
ERYTHROCYTE [DISTWIDTH] IN BLOOD BY AUTOMATED COUNT: 17.6 % (ref 9.6–15.2)
LYMPHOCYTES # BLD AUTO: 0.14 X10^3/UL (ref 1–3.4)
LYMPHOCYTES NFR BLD AUTO: 5 % (ref 22–44)
MCH RBC QN AUTO: 32.7 PG (ref 27–34.8)
MCHC RBC AUTO-ENTMCNC: 33.2 G/DL (ref 32.4–35.8)
MCV RBC AUTO: 98.4 FL (ref 80–100)
MD: (no result)
MONOCYTES # BLD AUTO: 0.02 X10^3/UL (ref 0.2–0.8)
MONOCYTES NFR BLD AUTO: 1 % (ref 2–9)
NEUTROPHILS # BLD AUTO: 2.6 X10^3/UL (ref 1.8–6.8)
NEUTROPHILS NFR BLD AUTO: 91 % (ref 42–75)
PLATELET # BLD AUTO: 159 X10^3/UL (ref 130–400)
PMV BLD AUTO: 7.8 FL (ref 7.4–10.4)
PROT SERPL-MCNC: 5.2 G/DL (ref 6.4–8.2)
RBC # BLD AUTO: 2.88 X10^6/UL (ref 3.82–5.3)

## 2019-07-12 RX ADMIN — DEXAMETHASONE SCH DROP: 1 SUSPENSION OPHTHALMIC at 17:44

## 2019-07-12 RX ADMIN — SODIUM CHLORIDE SCH MLS/HR: 0.9 INJECTION, SOLUTION INTRAVENOUS at 23:23

## 2019-07-12 RX ADMIN — DEXAMETHASONE SCH DROP: 1 SUSPENSION OPHTHALMIC at 11:22

## 2019-07-12 RX ADMIN — ENOXAPARIN SODIUM SCH MG: 40 INJECTION SUBCUTANEOUS at 07:42

## 2019-07-12 RX ADMIN — PANTOPRAZOLE SODIUM SCH MG: 40 TABLET, DELAYED RELEASE ORAL at 17:44

## 2019-07-12 RX ADMIN — Medication SCH MG: at 07:43

## 2019-07-12 RX ADMIN — CYTARABINE SCH MLS/HR: 100 INJECTION, SOLUTION INTRATHECAL; INTRAVENOUS; SUBCUTANEOUS at 15:33

## 2019-07-12 RX ADMIN — DEXAMETHASONE SCH DROP: 1 SUSPENSION OPHTHALMIC at 20:47

## 2019-07-12 RX ADMIN — PANTOPRAZOLE SODIUM SCH MG: 40 TABLET, DELAYED RELEASE ORAL at 05:54

## 2019-07-12 RX ADMIN — Medication SCH MG: at 07:42

## 2019-07-12 RX ADMIN — MAGNESIUM OXIDE SCH MG: 400 TABLET ORAL at 07:43

## 2019-07-12 RX ADMIN — SODIUM CHLORIDE SCH MLS/HR: 0.9 INJECTION, SOLUTION INTRAVENOUS at 02:05

## 2019-07-12 RX ADMIN — SODIUM CHLORIDE SCH MLS/HR: 0.9 INJECTION, SOLUTION INTRAVENOUS at 13:43

## 2019-07-12 RX ADMIN — DEXAMETHASONE SCH DROP: 1 SUSPENSION OPHTHALMIC at 05:49

## 2019-07-12 RX ADMIN — CHOLECALCIFEROL TAB 125 MCG (5000 UNIT) SCH UNIT: 125 TAB at 07:43

## 2019-07-13 VITALS — DIASTOLIC BLOOD PRESSURE: 59 MMHG | SYSTOLIC BLOOD PRESSURE: 115 MMHG

## 2019-07-13 VITALS — DIASTOLIC BLOOD PRESSURE: 76 MMHG | SYSTOLIC BLOOD PRESSURE: 131 MMHG

## 2019-07-13 LAB
ALBUMIN SERPL-MCNC: 3 G/DL (ref 3.4–5)
ALP SERPL-CCNC: 94 U/L (ref 45–117)
ALT SERPL-CCNC: 47 U/L (ref 12–78)
ANION GAP SERPL CALC-SCNC: 5 MMOL/L (ref 5–15)
BASOPHILS # BLD AUTO: 0.02 X10^3/UL (ref 0–0.1)
BASOPHILS NFR BLD AUTO: 1 % (ref 0–1)
BILIRUB SERPL-MCNC: 0.3 MG/DL (ref 0.2–1)
CALCIUM SERPL-MCNC: 8.9 MG/DL (ref 8.5–10.1)
CHLORIDE SERPL-SCNC: 111 MMOL/L (ref 98–107)
CREAT SERPL-MCNC: 0.55 MG/DL (ref 0.55–1.02)
EOSINOPHIL # BLD AUTO: 0 X10^3/UL (ref 0–0.4)
EOSINOPHIL NFR BLD AUTO: 0 % (ref 1–7)
ERYTHROCYTE [DISTWIDTH] IN BLOOD BY AUTOMATED COUNT: 17.3 % (ref 9.6–15.2)
LYMPHOCYTES # BLD AUTO: 0.09 X10^3/UL (ref 1–3.4)
LYMPHOCYTES NFR BLD AUTO: 3 % (ref 22–44)
MCH RBC QN AUTO: 32.6 PG (ref 27–34.8)
MCHC RBC AUTO-ENTMCNC: 33.5 G/DL (ref 32.4–35.8)
MCV RBC AUTO: 97.3 FL (ref 80–100)
MD: (no result)
MONOCYTES # BLD AUTO: 0 X10^3/UL (ref 0.2–0.8)
MONOCYTES NFR BLD AUTO: 0 % (ref 2–9)
NEUTROPHILS # BLD AUTO: 3.37 X10^3/UL (ref 1.8–6.8)
NEUTROPHILS NFR BLD AUTO: 97 % (ref 42–75)
PLATELET # BLD AUTO: 169 X10^3/UL (ref 130–400)
PMV BLD AUTO: 7.5 FL (ref 7.4–10.4)
PROT SERPL-MCNC: 5.9 G/DL (ref 6.4–8.2)
RBC # BLD AUTO: 3.05 X10^6/UL (ref 3.82–5.3)

## 2019-07-13 RX ADMIN — SODIUM CHLORIDE SCH MLS/HR: 0.9 INJECTION, SOLUTION INTRAVENOUS at 08:14

## 2019-07-13 RX ADMIN — ENOXAPARIN SODIUM SCH MG: 40 INJECTION SUBCUTANEOUS at 08:13

## 2019-07-13 RX ADMIN — Medication SCH MG: at 08:13

## 2019-07-13 RX ADMIN — CYTARABINE SCH MLS/HR: 100 INJECTION, SOLUTION INTRATHECAL; INTRAVENOUS; SUBCUTANEOUS at 03:27

## 2019-07-13 RX ADMIN — CHOLECALCIFEROL TAB 125 MCG (5000 UNIT) SCH UNIT: 125 TAB at 08:13

## 2019-07-13 RX ADMIN — MAGNESIUM OXIDE SCH MG: 400 TABLET ORAL at 08:13

## 2019-07-13 RX ADMIN — PANTOPRAZOLE SODIUM SCH MG: 40 TABLET, DELAYED RELEASE ORAL at 06:03

## 2019-07-13 RX ADMIN — DEXAMETHASONE SCH DROP: 1 SUSPENSION OPHTHALMIC at 11:36

## 2019-07-13 RX ADMIN — DEXAMETHASONE SCH DROP: 1 SUSPENSION OPHTHALMIC at 06:03

## 2019-07-22 ENCOUNTER — HOSPITAL ENCOUNTER (INPATIENT)
Dept: HOSPITAL 8 - ED | Age: 59
LOS: 8 days | DRG: 871 | End: 2019-07-30
Attending: HOSPITALIST | Admitting: HOSPITALIST
Payer: COMMERCIAL

## 2019-07-22 VITALS — SYSTOLIC BLOOD PRESSURE: 114 MMHG | DIASTOLIC BLOOD PRESSURE: 50 MMHG

## 2019-07-22 VITALS — BODY MASS INDEX: 37.07 KG/M2 | WEIGHT: 209.22 LBS | HEIGHT: 63 IN

## 2019-07-22 VITALS — DIASTOLIC BLOOD PRESSURE: 59 MMHG | SYSTOLIC BLOOD PRESSURE: 95 MMHG

## 2019-07-22 VITALS — SYSTOLIC BLOOD PRESSURE: 90 MMHG | DIASTOLIC BLOOD PRESSURE: 57 MMHG

## 2019-07-22 VITALS — DIASTOLIC BLOOD PRESSURE: 68 MMHG | SYSTOLIC BLOOD PRESSURE: 137 MMHG

## 2019-07-22 VITALS — DIASTOLIC BLOOD PRESSURE: 54 MMHG | SYSTOLIC BLOOD PRESSURE: 86 MMHG

## 2019-07-22 VITALS — SYSTOLIC BLOOD PRESSURE: 125 MMHG | DIASTOLIC BLOOD PRESSURE: 75 MMHG

## 2019-07-22 DIAGNOSIS — C83.30: ICD-10-CM
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
## 2019-07-22 DIAGNOSIS — K21.9: ICD-10-CM

## 2019-07-22 DIAGNOSIS — Z16.12: ICD-10-CM

## 2019-07-22 DIAGNOSIS — D69.59: ICD-10-CM

## 2019-07-22 DIAGNOSIS — C78.00: ICD-10-CM

## 2019-07-22 DIAGNOSIS — Z15.01: ICD-10-CM

## 2019-07-22 DIAGNOSIS — E66.9: ICD-10-CM

## 2019-07-22 DIAGNOSIS — T36.5X5A: ICD-10-CM

## 2019-07-22 DIAGNOSIS — J18.1: ICD-10-CM

## 2019-07-22 DIAGNOSIS — Z88.8: ICD-10-CM

## 2019-07-22 DIAGNOSIS — Z51.5: ICD-10-CM

## 2019-07-22 DIAGNOSIS — K57.90: ICD-10-CM

## 2019-07-22 DIAGNOSIS — J44.0: ICD-10-CM

## 2019-07-22 DIAGNOSIS — Z86.19: ICD-10-CM

## 2019-07-22 DIAGNOSIS — C92.00: ICD-10-CM

## 2019-07-22 DIAGNOSIS — N17.9: ICD-10-CM

## 2019-07-22 DIAGNOSIS — T50.995A: ICD-10-CM

## 2019-07-22 DIAGNOSIS — M48.50XA: ICD-10-CM

## 2019-07-22 DIAGNOSIS — Q92.8: ICD-10-CM

## 2019-07-22 DIAGNOSIS — I48.0: ICD-10-CM

## 2019-07-22 DIAGNOSIS — E16.2: ICD-10-CM

## 2019-07-22 DIAGNOSIS — B02.9: ICD-10-CM

## 2019-07-22 DIAGNOSIS — G93.40: ICD-10-CM

## 2019-07-22 DIAGNOSIS — Z90.710: ICD-10-CM

## 2019-07-22 DIAGNOSIS — I50.32: ICD-10-CM

## 2019-07-22 DIAGNOSIS — D89.9: ICD-10-CM

## 2019-07-22 DIAGNOSIS — E87.6: ICD-10-CM

## 2019-07-22 DIAGNOSIS — R39.15: ICD-10-CM

## 2019-07-22 DIAGNOSIS — N39.0: ICD-10-CM

## 2019-07-22 DIAGNOSIS — Z81.8: ICD-10-CM

## 2019-07-22 DIAGNOSIS — R65.20: ICD-10-CM

## 2019-07-22 DIAGNOSIS — Z87.891: ICD-10-CM

## 2019-07-22 DIAGNOSIS — R50.81: ICD-10-CM

## 2019-07-22 DIAGNOSIS — D70.1: ICD-10-CM

## 2019-07-22 DIAGNOSIS — T45.1X5A: ICD-10-CM

## 2019-07-22 DIAGNOSIS — Z66: ICD-10-CM

## 2019-07-22 DIAGNOSIS — Z16.23: ICD-10-CM

## 2019-07-22 DIAGNOSIS — G47.33: ICD-10-CM

## 2019-07-22 DIAGNOSIS — K64.9: ICD-10-CM

## 2019-07-22 DIAGNOSIS — A41.51: Primary | ICD-10-CM

## 2019-07-22 DIAGNOSIS — E44.0: ICD-10-CM

## 2019-07-22 DIAGNOSIS — Z88.2: ICD-10-CM

## 2019-07-22 DIAGNOSIS — D63.8: ICD-10-CM

## 2019-07-22 DIAGNOSIS — Z88.0: ICD-10-CM

## 2019-07-22 DIAGNOSIS — C50.919: ICD-10-CM

## 2019-07-22 DIAGNOSIS — J96.01: ICD-10-CM

## 2019-07-22 DIAGNOSIS — Z92.3: ICD-10-CM

## 2019-07-22 LAB
ALBUMIN SERPL-MCNC: 2.9 G/DL (ref 3.4–5)
ALP SERPL-CCNC: 124 U/L (ref 45–117)
ALT SERPL-CCNC: 29 U/L (ref 12–78)
ANION GAP SERPL CALC-SCNC: 11 MMOL/L (ref 5–15)
APTT BLD: 33 SECONDS (ref 25–31)
BILIRUB SERPL-MCNC: 0.8 MG/DL (ref 0.2–1)
CALCIUM SERPL-MCNC: 8.3 MG/DL (ref 8.5–10.1)
CHLORIDE SERPL-SCNC: 104 MMOL/L (ref 98–107)
CREAT SERPL-MCNC: 0.83 MG/DL (ref 0.55–1.02)
CULTURE INDICATED?: YES
ERYTHROCYTE [DISTWIDTH] IN BLOOD BY AUTOMATED COUNT: 14.7 % (ref 9.6–15.2)
INR PPP: 1.27 (ref 0.93–1.1)
MCH RBC QN AUTO: 32.2 PG (ref 27–34.8)
MCHC RBC AUTO-ENTMCNC: 33.7 G/DL (ref 32.4–35.8)
MCV RBC AUTO: 95.3 FL (ref 80–100)
MICROSCOPIC: (no result)
PLATELET # BLD AUTO: 2 X10^3/UL (ref 130–400)
PMV BLD AUTO: 7.5 FL (ref 7.4–10.4)
PROT SERPL-MCNC: 6.2 G/DL (ref 6.4–8.2)
PROTHROMBIN TIME: 13.2 SECONDS (ref 9.6–11.5)
RBC # BLD AUTO: 2.37 X10^6/UL (ref 3.82–5.3)

## 2019-07-22 PROCEDURE — 86850 RBC ANTIBODY SCREEN: CPT

## 2019-07-22 PROCEDURE — 73502 X-RAY EXAM HIP UNI 2-3 VIEWS: CPT

## 2019-07-22 PROCEDURE — 85730 THROMBOPLASTIN TIME PARTIAL: CPT

## 2019-07-22 PROCEDURE — 84443 ASSAY THYROID STIM HORMONE: CPT

## 2019-07-22 PROCEDURE — P9037 PLATE PHERES LEUKOREDU IRRAD: HCPCS

## 2019-07-22 PROCEDURE — 96374 THER/PROPH/DIAG INJ IV PUSH: CPT

## 2019-07-22 PROCEDURE — 87184 SC STD DISK METHOD PER PLATE: CPT

## 2019-07-22 PROCEDURE — 86923 COMPATIBILITY TEST ELECTRIC: CPT

## 2019-07-22 PROCEDURE — 84145 PROCALCITONIN (PCT): CPT

## 2019-07-22 PROCEDURE — 70450 CT HEAD/BRAIN W/O DYE: CPT

## 2019-07-22 PROCEDURE — 36600 WITHDRAWAL OF ARTERIAL BLOOD: CPT

## 2019-07-22 PROCEDURE — 96375 TX/PRO/DX INJ NEW DRUG ADDON: CPT

## 2019-07-22 PROCEDURE — 82962 GLUCOSE BLOOD TEST: CPT

## 2019-07-22 PROCEDURE — 87086 URINE CULTURE/COLONY COUNT: CPT

## 2019-07-22 PROCEDURE — 99291 CRITICAL CARE FIRST HOUR: CPT

## 2019-07-22 PROCEDURE — 83735 ASSAY OF MAGNESIUM: CPT

## 2019-07-22 PROCEDURE — 84100 ASSAY OF PHOSPHORUS: CPT

## 2019-07-22 PROCEDURE — 36415 COLL VENOUS BLD VENIPUNCTURE: CPT

## 2019-07-22 PROCEDURE — 86900 BLOOD TYPING SEROLOGIC ABO: CPT

## 2019-07-22 PROCEDURE — 82140 ASSAY OF AMMONIA: CPT

## 2019-07-22 PROCEDURE — 71045 X-RAY EXAM CHEST 1 VIEW: CPT

## 2019-07-22 PROCEDURE — 36430 TRANSFUSION BLD/BLD COMPNT: CPT

## 2019-07-22 PROCEDURE — 87077 CULTURE AEROBIC IDENTIFY: CPT

## 2019-07-22 PROCEDURE — 81001 URINALYSIS AUTO W/SCOPE: CPT

## 2019-07-22 PROCEDURE — 85610 PROTHROMBIN TIME: CPT

## 2019-07-22 PROCEDURE — 80053 COMPREHEN METABOLIC PANEL: CPT

## 2019-07-22 PROCEDURE — P9040 RBC LEUKOREDUCED IRRADIATED: HCPCS

## 2019-07-22 PROCEDURE — 87081 CULTURE SCREEN ONLY: CPT

## 2019-07-22 PROCEDURE — 83550 IRON BINDING TEST: CPT

## 2019-07-22 PROCEDURE — 83540 ASSAY OF IRON: CPT

## 2019-07-22 PROCEDURE — 71250 CT THORAX DX C-: CPT

## 2019-07-22 PROCEDURE — 87186 SC STD MICRODIL/AGAR DIL: CPT

## 2019-07-22 PROCEDURE — 94640 AIRWAY INHALATION TREATMENT: CPT

## 2019-07-22 PROCEDURE — 87040 BLOOD CULTURE FOR BACTERIA: CPT

## 2019-07-22 PROCEDURE — 80048 BASIC METABOLIC PNL TOTAL CA: CPT

## 2019-07-22 PROCEDURE — 85025 COMPLETE CBC W/AUTO DIFF WBC: CPT

## 2019-07-22 PROCEDURE — 83605 ASSAY OF LACTIC ACID: CPT

## 2019-07-22 PROCEDURE — 93005 ELECTROCARDIOGRAM TRACING: CPT

## 2019-07-22 PROCEDURE — 82803 BLOOD GASES ANY COMBINATION: CPT

## 2019-07-22 PROCEDURE — 85027 COMPLETE CBC AUTOMATED: CPT

## 2019-07-22 RX ADMIN — ONDANSETRON SCH MG: 8 TABLET, ORALLY DISINTEGRATING ORAL at 23:53

## 2019-07-22 RX ADMIN — SODIUM CHLORIDE SCH MLS/HR: 0.9 INJECTION, SOLUTION INTRAVENOUS at 14:58

## 2019-07-22 RX ADMIN — ONDANSETRON SCH MG: 8 TABLET, ORALLY DISINTEGRATING ORAL at 16:04

## 2019-07-22 RX ADMIN — ACYCLOVIR SCH MG: 400 TABLET ORAL at 23:53

## 2019-07-22 RX ADMIN — PROMETHAZINE HYDROCHLORIDE PRN MG: 25 INJECTION INTRAMUSCULAR; INTRAVENOUS at 21:49

## 2019-07-22 RX ADMIN — CEFEPIME SCH MLS/HR: 2 INJECTION, POWDER, FOR SOLUTION INTRAVENOUS at 16:58

## 2019-07-22 RX ADMIN — HYDROCODONE BITARTRATE AND ACETAMINOPHEN PRN TAB: 10; 325 TABLET ORAL at 18:26

## 2019-07-22 RX ADMIN — PROMETHAZINE HYDROCHLORIDE PRN MG: 25 INJECTION INTRAMUSCULAR; INTRAVENOUS at 15:11

## 2019-07-22 RX ADMIN — PANTOPRAZOLE SODIUM SCH MG: 40 TABLET, DELAYED RELEASE ORAL at 16:03

## 2019-07-22 NOTE — NUR
PT WITH ORDERS TO TRANSFUSE PLATLETS, CONSENT OBTAINED. PLATLETS HUNG. PT WITH 
TEMP 103.0,  PRIOR TO PLATLET TRANSFUSION. WILL GIVE TYLENOL PER MAR

## 2019-07-23 VITALS — SYSTOLIC BLOOD PRESSURE: 106 MMHG | DIASTOLIC BLOOD PRESSURE: 59 MMHG

## 2019-07-23 VITALS — DIASTOLIC BLOOD PRESSURE: 64 MMHG | SYSTOLIC BLOOD PRESSURE: 95 MMHG

## 2019-07-23 VITALS — DIASTOLIC BLOOD PRESSURE: 81 MMHG | SYSTOLIC BLOOD PRESSURE: 116 MMHG

## 2019-07-23 VITALS — SYSTOLIC BLOOD PRESSURE: 107 MMHG | DIASTOLIC BLOOD PRESSURE: 70 MMHG

## 2019-07-23 VITALS — SYSTOLIC BLOOD PRESSURE: 142 MMHG | DIASTOLIC BLOOD PRESSURE: 78 MMHG

## 2019-07-23 VITALS — DIASTOLIC BLOOD PRESSURE: 67 MMHG | SYSTOLIC BLOOD PRESSURE: 101 MMHG

## 2019-07-23 VITALS — SYSTOLIC BLOOD PRESSURE: 125 MMHG | DIASTOLIC BLOOD PRESSURE: 73 MMHG

## 2019-07-23 VITALS — DIASTOLIC BLOOD PRESSURE: 79 MMHG | SYSTOLIC BLOOD PRESSURE: 115 MMHG

## 2019-07-23 VITALS — DIASTOLIC BLOOD PRESSURE: 80 MMHG | SYSTOLIC BLOOD PRESSURE: 116 MMHG

## 2019-07-23 VITALS — DIASTOLIC BLOOD PRESSURE: 66 MMHG | SYSTOLIC BLOOD PRESSURE: 103 MMHG

## 2019-07-23 VITALS — SYSTOLIC BLOOD PRESSURE: 95 MMHG | DIASTOLIC BLOOD PRESSURE: 64 MMHG

## 2019-07-23 LAB
<PLATELET ESTIMATE>: (no result)
<PLT MORPHOLOGY>: (no result)
ALBUMIN SERPL-MCNC: 2.4 G/DL (ref 3.4–5)
ALP SERPL-CCNC: 78 U/L (ref 45–117)
ALT SERPL-CCNC: 48 U/L (ref 12–78)
ANION GAP SERPL CALC-SCNC: 9 MMOL/L (ref 5–15)
ANISOCYTOSIS BLD QL SMEAR: (no result)
BILIRUB SERPL-MCNC: 0.6 MG/DL (ref 0.2–1)
CALCIUM SERPL-MCNC: 7.8 MG/DL (ref 8.5–10.1)
CHLORIDE SERPL-SCNC: 107 MMOL/L (ref 98–107)
CREAT SERPL-MCNC: 0.86 MG/DL (ref 0.55–1.02)
EOS#(MANUAL): 0.01 X10^3/UL (ref 0–0.4)
EOS% (MANUAL): 10 % (ref 1–7)
ERYTHROCYTE [DISTWIDTH] IN BLOOD BY AUTOMATED COUNT: 15.1 % (ref 9.6–15.2)
ERYTHROCYTE [DISTWIDTH] IN BLOOD BY AUTOMATED COUNT: 15.3 % (ref 9.6–15.2)
LYMPH#(MANUAL): 0.06 X10^3/UL (ref 1–3.4)
LYMPHS% (MANUAL): 60 % (ref 22–44)
MCH RBC QN AUTO: 31.4 PG (ref 27–34.8)
MCH RBC QN AUTO: 32.7 PG (ref 27–34.8)
MCHC RBC AUTO-ENTMCNC: 34 G/DL (ref 32.4–35.8)
MCHC RBC AUTO-ENTMCNC: 34 G/DL (ref 32.4–35.8)
MCV RBC AUTO: 92.4 FL (ref 80–100)
MCV RBC AUTO: 96 FL (ref 80–100)
MD: YES
MONOS#(MANUAL): 0.01 X10^3/UL (ref 0.3–2.7)
MONOS% (MANUAL): 10 % (ref 2–9)
PLATELET # BLD AUTO: 10 X10^3/UL (ref 130–400)
PLATELET # BLD AUTO: 10 X10^3/UL (ref 130–400)
PMV BLD AUTO: 8.1 FL (ref 7.4–10.4)
PMV BLD AUTO: 8.4 FL (ref 7.4–10.4)
PROT SERPL-MCNC: 5.4 G/DL (ref 6.4–8.2)
RBC # BLD AUTO: 1.91 X10^6/UL (ref 3.82–5.3)
RBC # BLD AUTO: 2.76 X10^6/UL (ref 3.82–5.3)
SEG#(MANUAL): 0.02 X10^3/UL (ref 1.8–6.8)
SEGS% (MANUAL): 20 % (ref 42–75)

## 2019-07-23 RX ADMIN — PANTOPRAZOLE SODIUM SCH MG: 40 TABLET, DELAYED RELEASE ORAL at 08:48

## 2019-07-23 RX ADMIN — PANTOPRAZOLE SODIUM SCH MG: 40 TABLET, DELAYED RELEASE ORAL at 17:26

## 2019-07-23 RX ADMIN — SODIUM CHLORIDE SCH MLS/HR: 0.9 INJECTION, SOLUTION INTRAVENOUS at 00:12

## 2019-07-23 RX ADMIN — TOBRAMYCIN SULFATE SCH MLS/HR: 40 INJECTION, SOLUTION INTRAMUSCULAR; INTRAVENOUS at 18:31

## 2019-07-23 RX ADMIN — PROCHLORPERAZINE EDISYLATE PRN MG: 5 INJECTION INTRAMUSCULAR; INTRAVENOUS at 19:00

## 2019-07-23 RX ADMIN — HYDROCODONE BITARTRATE AND ACETAMINOPHEN PRN TAB: 10; 325 TABLET ORAL at 05:02

## 2019-07-23 RX ADMIN — CEFEPIME SCH MLS/HR: 2 INJECTION, POWDER, FOR SOLUTION INTRAVENOUS at 00:12

## 2019-07-23 RX ADMIN — ONDANSETRON SCH MG: 8 TABLET, ORALLY DISINTEGRATING ORAL at 17:26

## 2019-07-23 RX ADMIN — DIPHENHYDRAMINE HYDROCHLORIDE PRN MG: 50 INJECTION, SOLUTION INTRAMUSCULAR; INTRAVENOUS at 22:53

## 2019-07-23 RX ADMIN — DIPHENHYDRAMINE HYDROCHLORIDE PRN MG: 50 INJECTION, SOLUTION INTRAMUSCULAR; INTRAVENOUS at 12:02

## 2019-07-23 RX ADMIN — ACYCLOVIR SCH MG: 400 TABLET ORAL at 08:48

## 2019-07-23 RX ADMIN — CEFEPIME SCH MLS/HR: 2 INJECTION, POWDER, FOR SOLUTION INTRAVENOUS at 08:45

## 2019-07-23 RX ADMIN — ONDANSETRON SCH MG: 8 TABLET, ORALLY DISINTEGRATING ORAL at 08:48

## 2019-07-23 RX ADMIN — LORAZEPAM PRN MG: 2 INJECTION INTRAMUSCULAR; INTRAVENOUS at 16:40

## 2019-07-23 RX ADMIN — PROMETHAZINE HYDROCHLORIDE PRN MG: 25 INJECTION INTRAMUSCULAR; INTRAVENOUS at 05:02

## 2019-07-23 RX ADMIN — LORAZEPAM PRN MG: 2 INJECTION INTRAMUSCULAR; INTRAVENOUS at 19:00

## 2019-07-23 RX ADMIN — ONDANSETRON SCH MG: 8 TABLET, ORALLY DISINTEGRATING ORAL at 22:53

## 2019-07-23 RX ADMIN — FLUCONAZOLE SCH MG: 200 TABLET ORAL at 08:47

## 2019-07-23 RX ADMIN — ACYCLOVIR SCH MG: 400 TABLET ORAL at 22:53

## 2019-07-23 RX ADMIN — CEFEPIME SCH MLS/HR: 2 INJECTION, POWDER, FOR SOLUTION INTRAVENOUS at 18:31

## 2019-07-24 VITALS — SYSTOLIC BLOOD PRESSURE: 93 MMHG | DIASTOLIC BLOOD PRESSURE: 60 MMHG

## 2019-07-24 VITALS — DIASTOLIC BLOOD PRESSURE: 65 MMHG | SYSTOLIC BLOOD PRESSURE: 97 MMHG

## 2019-07-24 VITALS — SYSTOLIC BLOOD PRESSURE: 102 MMHG | DIASTOLIC BLOOD PRESSURE: 63 MMHG

## 2019-07-24 VITALS — SYSTOLIC BLOOD PRESSURE: 98 MMHG | DIASTOLIC BLOOD PRESSURE: 61 MMHG

## 2019-07-24 VITALS — DIASTOLIC BLOOD PRESSURE: 70 MMHG | SYSTOLIC BLOOD PRESSURE: 110 MMHG

## 2019-07-24 VITALS — DIASTOLIC BLOOD PRESSURE: 70 MMHG | SYSTOLIC BLOOD PRESSURE: 105 MMHG

## 2019-07-24 LAB
<PLATELET ESTIMATE>: (no result)
<PLT MORPHOLOGY>: (no result)
ANION GAP SERPL CALC-SCNC: 9 MMOL/L (ref 5–15)
ANISOCYTOSIS BLD QL SMEAR: (no result)
BAND#(MANUAL): 0.01 X10^3/UL
CALCIUM SERPL-MCNC: 8.5 MG/DL (ref 8.5–10.1)
CHLORIDE SERPL-SCNC: 109 MMOL/L (ref 98–107)
CREAT SERPL-MCNC: 0.84 MG/DL (ref 0.55–1.02)
ERYTHROCYTE [DISTWIDTH] IN BLOOD BY AUTOMATED COUNT: 15.9 % (ref 9.6–15.2)
LYMPH#(MANUAL): 0.03 X10^3/UL (ref 1–3.4)
LYMPHS% (MANUAL): 30 % (ref 22–44)
MCH RBC QN AUTO: 32.2 PG (ref 27–34.8)
MCHC RBC AUTO-ENTMCNC: 34.5 G/DL (ref 32.4–35.8)
MCV RBC AUTO: 93.2 FL (ref 80–100)
MD: YES
MONOS#(MANUAL): 0.03 X10^3/UL (ref 0.3–2.7)
MONOS% (MANUAL): 30 % (ref 2–9)
NEUTS BAND NFR BLD: 10 % (ref 0–7)
PLATELET # BLD AUTO: 6 X10^3/UL (ref 130–400)
PMV BLD AUTO: 7.8 FL (ref 7.4–10.4)
RBC # BLD AUTO: 2.62 X10^6/UL (ref 3.82–5.3)
SEG#(MANUAL): 0.03 X10^3/UL (ref 1.8–6.8)
SEGS% (MANUAL): 30 % (ref 42–75)

## 2019-07-24 RX ADMIN — TOBRAMYCIN SULFATE SCH MLS/HR: 40 INJECTION, SOLUTION INTRAMUSCULAR; INTRAVENOUS at 17:35

## 2019-07-24 RX ADMIN — VORICONAZOLE SCH MG: 200 TABLET, FILM COATED ORAL at 21:24

## 2019-07-24 RX ADMIN — ONDANSETRON SCH MG: 8 TABLET, ORALLY DISINTEGRATING ORAL at 17:35

## 2019-07-24 RX ADMIN — CEFEPIME SCH MLS/HR: 2 INJECTION, POWDER, FOR SOLUTION INTRAVENOUS at 01:53

## 2019-07-24 RX ADMIN — HYDROCODONE BITARTRATE AND ACETAMINOPHEN PRN TAB: 10; 325 TABLET ORAL at 05:27

## 2019-07-24 RX ADMIN — ACETAMINOPHEN PRN MG: 325 TABLET, FILM COATED ORAL at 14:36

## 2019-07-24 RX ADMIN — CEFTOLOZANE AND TAZOBACTAM SCH MLS/HR: 1; .5 INJECTION, POWDER, LYOPHILIZED, FOR SOLUTION INTRAVENOUS at 13:17

## 2019-07-24 RX ADMIN — LORAZEPAM PRN MG: 2 INJECTION INTRAMUSCULAR; INTRAVENOUS at 23:18

## 2019-07-24 RX ADMIN — ALBUTEROL SULFATE PRN MG: 2.5 SOLUTION RESPIRATORY (INHALATION) at 12:19

## 2019-07-24 RX ADMIN — CEFTOLOZANE AND TAZOBACTAM SCH MLS/HR: 1; .5 INJECTION, POWDER, LYOPHILIZED, FOR SOLUTION INTRAVENOUS at 21:23

## 2019-07-24 RX ADMIN — Medication SCH MG: at 09:22

## 2019-07-24 RX ADMIN — FAMOTIDINE SCH MG: 20 TABLET, FILM COATED ORAL at 21:23

## 2019-07-24 RX ADMIN — CEFEPIME SCH MLS/HR: 2 INJECTION, POWDER, FOR SOLUTION INTRAVENOUS at 09:23

## 2019-07-24 RX ADMIN — ONDANSETRON SCH MG: 8 TABLET, ORALLY DISINTEGRATING ORAL at 09:22

## 2019-07-24 RX ADMIN — SODIUM CHLORIDE SCH MLS/HR: 0.9 INJECTION, SOLUTION INTRAVENOUS at 19:51

## 2019-07-24 RX ADMIN — VORICONAZOLE SCH MG: 200 TABLET, FILM COATED ORAL at 11:33

## 2019-07-24 RX ADMIN — PROCHLORPERAZINE EDISYLATE PRN MG: 5 INJECTION INTRAMUSCULAR; INTRAVENOUS at 23:18

## 2019-07-24 RX ADMIN — ONDANSETRON SCH MG: 8 TABLET, ORALLY DISINTEGRATING ORAL at 21:24

## 2019-07-24 RX ADMIN — ACYCLOVIR SCH MG: 400 TABLET ORAL at 09:22

## 2019-07-24 RX ADMIN — ACYCLOVIR SCH MG: 400 TABLET ORAL at 21:23

## 2019-07-24 RX ADMIN — FAMOTIDINE SCH MG: 20 TABLET, FILM COATED ORAL at 11:31

## 2019-07-24 RX ADMIN — FLUCONAZOLE SCH MG: 200 TABLET ORAL at 09:22

## 2019-07-24 RX ADMIN — Medication SCH MG: at 21:23

## 2019-07-24 RX ADMIN — SODIUM CHLORIDE SCH MLS/HR: 0.9 INJECTION, SOLUTION INTRAVENOUS at 07:30

## 2019-07-25 VITALS — DIASTOLIC BLOOD PRESSURE: 47 MMHG | SYSTOLIC BLOOD PRESSURE: 105 MMHG

## 2019-07-25 VITALS — SYSTOLIC BLOOD PRESSURE: 96 MMHG | DIASTOLIC BLOOD PRESSURE: 65 MMHG

## 2019-07-25 VITALS — SYSTOLIC BLOOD PRESSURE: 94 MMHG | DIASTOLIC BLOOD PRESSURE: 56 MMHG

## 2019-07-25 VITALS — SYSTOLIC BLOOD PRESSURE: 140 MMHG | DIASTOLIC BLOOD PRESSURE: 88 MMHG

## 2019-07-25 VITALS — DIASTOLIC BLOOD PRESSURE: 56 MMHG | SYSTOLIC BLOOD PRESSURE: 107 MMHG

## 2019-07-25 VITALS — DIASTOLIC BLOOD PRESSURE: 53 MMHG | SYSTOLIC BLOOD PRESSURE: 88 MMHG

## 2019-07-25 VITALS — SYSTOLIC BLOOD PRESSURE: 116 MMHG | DIASTOLIC BLOOD PRESSURE: 81 MMHG

## 2019-07-25 VITALS — SYSTOLIC BLOOD PRESSURE: 136 MMHG | DIASTOLIC BLOOD PRESSURE: 84 MMHG

## 2019-07-25 LAB
% IRON SATURATION: 104 % (ref 20–55)
<PLATELET ESTIMATE>: (no result)
<PLT MORPHOLOGY>: (no result)
ANION GAP SERPL CALC-SCNC: 11 MMOL/L (ref 5–15)
ANISOCYTOSIS BLD QL SMEAR: (no result)
BAND#(MANUAL): 0.03 X10^3/UL
CALCIUM SERPL-MCNC: 8.6 MG/DL (ref 8.5–10.1)
CHLORIDE SERPL-SCNC: 113 MMOL/L (ref 98–107)
CREAT SERPL-MCNC: 1.74 MG/DL (ref 0.55–1.02)
ERYTHROCYTE [DISTWIDTH] IN BLOOD BY AUTOMATED COUNT: 16.2 % (ref 9.6–15.2)
IRON LEVEL: 130 MCG/DL (ref 50–170)
LYMPH#(MANUAL): 0.13 X10^3/UL (ref 1–3.4)
LYMPHS% (MANUAL): 44 % (ref 22–44)
MCH RBC QN AUTO: 31.8 PG (ref 27–34.8)
MCHC RBC AUTO-ENTMCNC: 33.8 G/DL (ref 32.4–35.8)
MCV RBC AUTO: 94.1 FL (ref 80–100)
MD: YES
MONOS#(MANUAL): 0.08 X10^3/UL (ref 0.3–2.7)
MONOS% (MANUAL): 26 % (ref 2–9)
NEUTS BAND NFR BLD: 10 % (ref 0–7)
O2 FLOW: 2 L/MIN
PLATELET # BLD AUTO: 9 X10^3/UL (ref 130–400)
PMV BLD AUTO: 8.4 FL (ref 7.4–10.4)
RBC # BLD AUTO: 2.56 X10^6/UL (ref 3.82–5.3)
SEG#(MANUAL): 0.06 X10^3/UL (ref 1.8–6.8)
SEGS% (MANUAL): 20 % (ref 42–75)
TIBC SERPL-MCNC: 125 MCG/DL (ref 250–450)

## 2019-07-25 RX ADMIN — ACYCLOVIR SCH MG: 400 TABLET ORAL at 09:20

## 2019-07-25 RX ADMIN — ACYCLOVIR SCH MG: 400 TABLET ORAL at 21:22

## 2019-07-25 RX ADMIN — ALBUTEROL SULFATE PRN MG: 2.5 SOLUTION RESPIRATORY (INHALATION) at 22:15

## 2019-07-25 RX ADMIN — FAMOTIDINE SCH MG: 20 TABLET, FILM COATED ORAL at 21:22

## 2019-07-25 RX ADMIN — VORICONAZOLE SCH MG: 200 TABLET, FILM COATED ORAL at 09:20

## 2019-07-25 RX ADMIN — FAMOTIDINE SCH MG: 20 TABLET, FILM COATED ORAL at 09:20

## 2019-07-25 RX ADMIN — ONDANSETRON SCH MG: 8 TABLET, ORALLY DISINTEGRATING ORAL at 17:08

## 2019-07-25 RX ADMIN — SODIUM CHLORIDE SCH MLS/HR: 0.9 INJECTION, SOLUTION INTRAVENOUS at 09:21

## 2019-07-25 RX ADMIN — CEFTOLOZANE AND TAZOBACTAM SCH MLS/HR: 1; .5 INJECTION, POWDER, LYOPHILIZED, FOR SOLUTION INTRAVENOUS at 04:34

## 2019-07-25 RX ADMIN — CEFTOLOZANE AND TAZOBACTAM SCH MLS/HR: 1; .5 INJECTION, POWDER, LYOPHILIZED, FOR SOLUTION INTRAVENOUS at 21:23

## 2019-07-25 RX ADMIN — PROMETHAZINE HYDROCHLORIDE PRN MG: 25 INJECTION INTRAMUSCULAR; INTRAVENOUS at 11:05

## 2019-07-25 RX ADMIN — PROCHLORPERAZINE EDISYLATE PRN MG: 5 INJECTION INTRAMUSCULAR; INTRAVENOUS at 09:29

## 2019-07-25 RX ADMIN — ONDANSETRON SCH MG: 8 TABLET, ORALLY DISINTEGRATING ORAL at 21:23

## 2019-07-25 RX ADMIN — VORICONAZOLE SCH MG: 200 TABLET, FILM COATED ORAL at 21:22

## 2019-07-25 RX ADMIN — Medication SCH MG: at 09:20

## 2019-07-25 RX ADMIN — Medication SCH MG: at 21:23

## 2019-07-25 RX ADMIN — ONDANSETRON SCH MG: 8 TABLET, ORALLY DISINTEGRATING ORAL at 09:20

## 2019-07-25 RX ADMIN — CEFTOLOZANE AND TAZOBACTAM SCH MLS/HR: 1; .5 INJECTION, POWDER, LYOPHILIZED, FOR SOLUTION INTRAVENOUS at 14:18

## 2019-07-25 RX ADMIN — SODIUM CHLORIDE SCH MLS/HR: 0.9 INJECTION, SOLUTION INTRAVENOUS at 14:20

## 2019-07-26 VITALS — SYSTOLIC BLOOD PRESSURE: 131 MMHG | DIASTOLIC BLOOD PRESSURE: 73 MMHG

## 2019-07-26 VITALS — DIASTOLIC BLOOD PRESSURE: 80 MMHG | SYSTOLIC BLOOD PRESSURE: 153 MMHG

## 2019-07-26 VITALS — SYSTOLIC BLOOD PRESSURE: 127 MMHG | DIASTOLIC BLOOD PRESSURE: 73 MMHG

## 2019-07-26 VITALS — DIASTOLIC BLOOD PRESSURE: 70 MMHG | SYSTOLIC BLOOD PRESSURE: 135 MMHG

## 2019-07-26 VITALS — SYSTOLIC BLOOD PRESSURE: 139 MMHG | DIASTOLIC BLOOD PRESSURE: 72 MMHG

## 2019-07-26 VITALS — DIASTOLIC BLOOD PRESSURE: 71 MMHG | SYSTOLIC BLOOD PRESSURE: 143 MMHG

## 2019-07-26 LAB
<PLATELET ESTIMATE>: (no result)
<PLT MORPHOLOGY>: (no result)
ANION GAP SERPL CALC-SCNC: 12 MMOL/L (ref 5–15)
ANISOCYTOSIS BLD QL SMEAR: (no result)
BAND#(MANUAL): 0.07 X10^3/UL
BURR CELLS BLD QL SMEAR: (no result)
CALCIUM SERPL-MCNC: 9.5 MG/DL (ref 8.5–10.1)
CHLORIDE SERPL-SCNC: 110 MMOL/L (ref 98–107)
CREAT SERPL-MCNC: 1.86 MG/DL (ref 0.55–1.02)
ERYTHROCYTE [DISTWIDTH] IN BLOOD BY AUTOMATED COUNT: 16.1 % (ref 9.6–15.2)
LYMPH#(MANUAL): 0.13 X10^3/UL (ref 1–3.4)
LYMPHS% (MANUAL): 10 % (ref 22–44)
MCH RBC QN AUTO: 32 PG (ref 27–34.8)
MCHC RBC AUTO-ENTMCNC: 33.9 G/DL (ref 32.4–35.8)
MCV RBC AUTO: 94.3 FL (ref 80–100)
MD: YES
METAMYELOCYTES# (MANUAL): 0.01 X10^3/UL (ref 0–0)
METAMYELOCYTES% (MANUAL): 1 % (ref 0–1)
MONOS#(MANUAL): 0.12 X10^3/UL (ref 0.3–2.7)
MONOS% (MANUAL): 9 % (ref 2–9)
MYELOCYTES# (MANUAL): 0.03 X10^3/UL (ref 0–0)
MYELOCYTES% (MANUAL): 2 % (ref 0–0)
NEUTS BAND NFR BLD: 5 % (ref 0–7)
PLATELET # BLD AUTO: 5 X10^3/UL (ref 130–400)
PMV BLD AUTO: 8.8 FL (ref 7.4–10.4)
RBC # BLD AUTO: 2.63 X10^6/UL (ref 3.82–5.3)
SEG#(MANUAL): 0.95 X10^3/UL (ref 1.8–6.8)
SEGS% (MANUAL): 73 % (ref 42–75)

## 2019-07-26 RX ADMIN — LIDOCAINE HYDROCHLORIDE PRN APPLIC: 20 JELLY TOPICAL at 10:51

## 2019-07-26 RX ADMIN — CEFTOLOZANE AND TAZOBACTAM SCH MLS/HR: 1; .5 INJECTION, POWDER, LYOPHILIZED, FOR SOLUTION INTRAVENOUS at 13:22

## 2019-07-26 RX ADMIN — FAMOTIDINE SCH MG: 20 TABLET, FILM COATED ORAL at 08:18

## 2019-07-26 RX ADMIN — ONDANSETRON SCH MG: 8 TABLET, ORALLY DISINTEGRATING ORAL at 21:34

## 2019-07-26 RX ADMIN — ACYCLOVIR SCH MG: 400 TABLET ORAL at 08:18

## 2019-07-26 RX ADMIN — ONDANSETRON SCH MG: 8 TABLET, ORALLY DISINTEGRATING ORAL at 08:18

## 2019-07-26 RX ADMIN — VORICONAZOLE SCH MG: 200 TABLET, FILM COATED ORAL at 08:19

## 2019-07-26 RX ADMIN — PROCHLORPERAZINE EDISYLATE PRN MG: 5 INJECTION INTRAMUSCULAR; INTRAVENOUS at 12:07

## 2019-07-26 RX ADMIN — ACYCLOVIR SCH MG: 400 TABLET ORAL at 21:34

## 2019-07-26 RX ADMIN — VORICONAZOLE SCH MG: 200 TABLET, FILM COATED ORAL at 21:34

## 2019-07-26 RX ADMIN — HYDROCODONE BITARTRATE AND ACETAMINOPHEN PRN TAB: 10; 325 TABLET ORAL at 12:07

## 2019-07-26 RX ADMIN — LIDOCAINE HYDROCHLORIDE PRN APPLIC: 20 JELLY TOPICAL at 03:05

## 2019-07-26 RX ADMIN — CEFTOLOZANE AND TAZOBACTAM SCH MLS/HR: 1; .5 INJECTION, POWDER, LYOPHILIZED, FOR SOLUTION INTRAVENOUS at 21:33

## 2019-07-26 RX ADMIN — FAMOTIDINE SCH MG: 20 TABLET, FILM COATED ORAL at 21:34

## 2019-07-26 RX ADMIN — LIDOCAINE HYDROCHLORIDE PRN APPLIC: 20 JELLY TOPICAL at 15:46

## 2019-07-26 RX ADMIN — CEFTOLOZANE AND TAZOBACTAM SCH MLS/HR: 1; .5 INJECTION, POWDER, LYOPHILIZED, FOR SOLUTION INTRAVENOUS at 05:28

## 2019-07-26 RX ADMIN — ONDANSETRON SCH MG: 8 TABLET, ORALLY DISINTEGRATING ORAL at 15:46

## 2019-07-26 RX ADMIN — LIDOCAINE HYDROCHLORIDE PRN APPLIC: 20 JELLY TOPICAL at 08:24

## 2019-07-26 RX ADMIN — ACETAMINOPHEN PRN MG: 325 TABLET, FILM COATED ORAL at 15:46

## 2019-07-26 RX ADMIN — Medication SCH MG: at 21:33

## 2019-07-26 RX ADMIN — Medication SCH MG: at 08:18

## 2019-07-27 VITALS — SYSTOLIC BLOOD PRESSURE: 149 MMHG | DIASTOLIC BLOOD PRESSURE: 76 MMHG

## 2019-07-27 VITALS — SYSTOLIC BLOOD PRESSURE: 148 MMHG | DIASTOLIC BLOOD PRESSURE: 72 MMHG

## 2019-07-27 VITALS — DIASTOLIC BLOOD PRESSURE: 97 MMHG | SYSTOLIC BLOOD PRESSURE: 164 MMHG

## 2019-07-27 VITALS — SYSTOLIC BLOOD PRESSURE: 139 MMHG | DIASTOLIC BLOOD PRESSURE: 80 MMHG

## 2019-07-27 VITALS — SYSTOLIC BLOOD PRESSURE: 144 MMHG | DIASTOLIC BLOOD PRESSURE: 72 MMHG

## 2019-07-27 VITALS — SYSTOLIC BLOOD PRESSURE: 144 MMHG | DIASTOLIC BLOOD PRESSURE: 76 MMHG

## 2019-07-27 VITALS — SYSTOLIC BLOOD PRESSURE: 152 MMHG | DIASTOLIC BLOOD PRESSURE: 80 MMHG

## 2019-07-27 LAB
<PLATELET ESTIMATE>: (no result)
<PLT MORPHOLOGY>: (no result)
ANION GAP SERPL CALC-SCNC: 10 MMOL/L (ref 5–15)
ANISOCYTOSIS BLD QL SMEAR: (no result)
BAND#(MANUAL): 0.52 X10^3/UL
CALCIUM SERPL-MCNC: 9.6 MG/DL (ref 8.5–10.1)
CHLORIDE SERPL-SCNC: 114 MMOL/L (ref 98–107)
CREAT SERPL-MCNC: 1.37 MG/DL (ref 0.55–1.02)
DOHLE BOD BLD QL SMEAR: (no result)
ERYTHROCYTE [DISTWIDTH] IN BLOOD BY AUTOMATED COUNT: 16.2 % (ref 9.6–15.2)
LYMPH#(MANUAL): 0.15 X10^3/UL (ref 1–3.4)
LYMPHS% (MANUAL): 4 % (ref 22–44)
MCH RBC QN AUTO: 31.6 PG (ref 27–34.8)
MCHC RBC AUTO-ENTMCNC: 34 G/DL (ref 32.4–35.8)
MCV RBC AUTO: 93.1 FL (ref 80–100)
MD: YES
METAMYELOCYTES# (MANUAL): 0.07 X10^3/UL (ref 0–0)
METAMYELOCYTES% (MANUAL): 2 % (ref 0–1)
MONOS#(MANUAL): 0.22 X10^3/UL (ref 0.3–2.7)
MONOS% (MANUAL): 6 % (ref 2–9)
NEUTS BAND NFR BLD: 14 % (ref 0–7)
PLATELET # BLD AUTO: 11 X10^3/UL (ref 130–400)
PMV BLD AUTO: 8.7 FL (ref 7.4–10.4)
RBC # BLD AUTO: 2.57 X10^6/UL (ref 3.82–5.3)
SEG#(MANUAL): 2.74 X10^3/UL (ref 1.8–6.8)
SEGS% (MANUAL): 74 % (ref 42–75)

## 2019-07-27 RX ADMIN — SODIUM CHLORIDE, PRESERVATIVE FREE SCH ML: 5 INJECTION INTRAVENOUS at 09:00

## 2019-07-27 RX ADMIN — ACYCLOVIR SCH MG: 400 TABLET ORAL at 20:12

## 2019-07-27 RX ADMIN — DEXTROSE, SODIUM CHLORIDE, AND POTASSIUM CHLORIDE SCH MLS/HR: 5; .45; .15 INJECTION INTRAVENOUS at 10:30

## 2019-07-27 RX ADMIN — VORICONAZOLE SCH MG: 200 TABLET, FILM COATED ORAL at 08:50

## 2019-07-27 RX ADMIN — CEFTOLOZANE AND TAZOBACTAM SCH MLS/HR: 1; .5 INJECTION, POWDER, LYOPHILIZED, FOR SOLUTION INTRAVENOUS at 05:04

## 2019-07-27 RX ADMIN — Medication SCH MG: at 08:50

## 2019-07-27 RX ADMIN — CEFTOLOZANE AND TAZOBACTAM SCH MLS/HR: 1; .5 INJECTION, POWDER, LYOPHILIZED, FOR SOLUTION INTRAVENOUS at 21:22

## 2019-07-27 RX ADMIN — SODIUM CHLORIDE, PRESERVATIVE FREE SCH ML: 5 INJECTION INTRAVENOUS at 20:33

## 2019-07-27 RX ADMIN — HYDROCODONE BITARTRATE AND ACETAMINOPHEN PRN TAB: 10; 325 TABLET ORAL at 18:16

## 2019-07-27 RX ADMIN — CEFTOLOZANE AND TAZOBACTAM SCH MLS/HR: 1; .5 INJECTION, POWDER, LYOPHILIZED, FOR SOLUTION INTRAVENOUS at 13:38

## 2019-07-27 RX ADMIN — DOCUSATE SODIUM SCH MG: 100 CAPSULE, LIQUID FILLED ORAL at 08:51

## 2019-07-27 RX ADMIN — VORICONAZOLE SCH MG: 200 TABLET, FILM COATED ORAL at 20:12

## 2019-07-27 RX ADMIN — LORAZEPAM PRN MG: 2 INJECTION INTRAMUSCULAR; INTRAVENOUS at 16:56

## 2019-07-27 RX ADMIN — DOCUSATE SODIUM SCH MG: 100 CAPSULE, LIQUID FILLED ORAL at 20:12

## 2019-07-27 RX ADMIN — LIDOCAINE HYDROCHLORIDE PRN APPLIC: 20 JELLY TOPICAL at 03:26

## 2019-07-27 RX ADMIN — ACYCLOVIR SCH MG: 400 TABLET ORAL at 08:50

## 2019-07-27 RX ADMIN — HYDROCODONE BITARTRATE AND ACETAMINOPHEN PRN TAB: 10; 325 TABLET ORAL at 13:39

## 2019-07-27 RX ADMIN — ONDANSETRON SCH MG: 8 TABLET, ORALLY DISINTEGRATING ORAL at 20:13

## 2019-07-27 RX ADMIN — ONDANSETRON SCH MG: 8 TABLET, ORALLY DISINTEGRATING ORAL at 15:34

## 2019-07-27 RX ADMIN — LIDOCAINE HYDROCHLORIDE PRN APPLIC: 20 JELLY TOPICAL at 11:44

## 2019-07-27 RX ADMIN — FAMOTIDINE SCH MG: 20 TABLET, FILM COATED ORAL at 20:13

## 2019-07-27 RX ADMIN — LORAZEPAM PRN MG: 2 INJECTION INTRAMUSCULAR; INTRAVENOUS at 11:44

## 2019-07-27 RX ADMIN — METOPROLOL TARTRATE SCH MG: 25 TABLET, FILM COATED ORAL at 15:34

## 2019-07-27 RX ADMIN — FAMOTIDINE SCH MG: 20 TABLET, FILM COATED ORAL at 08:50

## 2019-07-27 RX ADMIN — ONDANSETRON SCH MG: 8 TABLET, ORALLY DISINTEGRATING ORAL at 08:49

## 2019-07-27 RX ADMIN — METOPROLOL TARTRATE SCH MG: 25 TABLET, FILM COATED ORAL at 20:33

## 2019-07-27 RX ADMIN — PROMETHAZINE HYDROCHLORIDE PRN MG: 25 INJECTION INTRAMUSCULAR; INTRAVENOUS at 20:32

## 2019-07-27 RX ADMIN — METOPROLOL TARTRATE SCH MG: 25 TABLET, FILM COATED ORAL at 08:51

## 2019-07-28 VITALS — SYSTOLIC BLOOD PRESSURE: 145 MMHG | DIASTOLIC BLOOD PRESSURE: 81 MMHG

## 2019-07-28 VITALS — DIASTOLIC BLOOD PRESSURE: 82 MMHG | SYSTOLIC BLOOD PRESSURE: 143 MMHG

## 2019-07-28 VITALS — SYSTOLIC BLOOD PRESSURE: 143 MMHG | DIASTOLIC BLOOD PRESSURE: 69 MMHG

## 2019-07-28 LAB
<PLATELET ESTIMATE>: (no result)
<PLT MORPHOLOGY>: (no result)
ANION GAP SERPL CALC-SCNC: 10 MMOL/L (ref 5–15)
ANISOCYTOSIS BLD QL SMEAR: (no result)
BAND#(MANUAL): 1.05 X10^3/UL
CALCIUM SERPL-MCNC: 9.4 MG/DL (ref 8.5–10.1)
CHLORIDE SERPL-SCNC: 115 MMOL/L (ref 98–107)
CREAT SERPL-MCNC: 1.23 MG/DL (ref 0.55–1.02)
DOHLE BOD BLD QL SMEAR: (no result)
ERYTHROCYTE [DISTWIDTH] IN BLOOD BY AUTOMATED COUNT: 16.1 % (ref 9.6–15.2)
LYMPH#(MANUAL): 0.38 X10^3/UL (ref 1–3.4)
LYMPHS% (MANUAL): 4 % (ref 22–44)
MCH RBC QN AUTO: 31.3 PG (ref 27–34.8)
MCHC RBC AUTO-ENTMCNC: 33.3 G/DL (ref 32.4–35.8)
MCV RBC AUTO: 93.9 FL (ref 80–100)
MD: YES
MONOS#(MANUAL): 0.57 X10^3/UL (ref 0.3–2.7)
MONOS% (MANUAL): 6 % (ref 2–9)
NEUTS BAND NFR BLD: 11 % (ref 0–7)
OVALOCYTES BLD QL SMEAR: (no result)
PLATELET # BLD AUTO: 20 X10^3/UL (ref 130–400)
PMV BLD AUTO: 9.4 FL (ref 7.4–10.4)
RBC # BLD AUTO: 2.62 X10^6/UL (ref 3.82–5.3)
SEG#(MANUAL): 7.51 X10^3/UL (ref 1.8–6.8)
SEGS% (MANUAL): 79 % (ref 42–75)

## 2019-07-28 RX ADMIN — METOPROLOL TARTRATE SCH MG: 25 TABLET, FILM COATED ORAL at 22:00

## 2019-07-28 RX ADMIN — ONDANSETRON SCH MG: 8 TABLET, ORALLY DISINTEGRATING ORAL at 09:32

## 2019-07-28 RX ADMIN — SODIUM CHLORIDE, PRESERVATIVE FREE SCH ML: 5 INJECTION INTRAVENOUS at 21:00

## 2019-07-28 RX ADMIN — LORAZEPAM PRN MG: 2 INJECTION INTRAMUSCULAR; INTRAVENOUS at 16:44

## 2019-07-28 RX ADMIN — FAMOTIDINE SCH MG: 20 TABLET, FILM COATED ORAL at 09:00

## 2019-07-28 RX ADMIN — MORPHINE SULFATE PRN MG: 4 INJECTION INTRAVENOUS at 20:53

## 2019-07-28 RX ADMIN — METOPROLOL TARTRATE SCH MG: 25 TABLET, FILM COATED ORAL at 05:11

## 2019-07-28 RX ADMIN — DEXTROSE, SODIUM CHLORIDE, AND POTASSIUM CHLORIDE SCH MLS/HR: 5; .45; .15 INJECTION INTRAVENOUS at 06:22

## 2019-07-28 RX ADMIN — ONDANSETRON SCH MG: 8 TABLET, ORALLY DISINTEGRATING ORAL at 16:00

## 2019-07-28 RX ADMIN — ONDANSETRON SCH MG: 8 TABLET, ORALLY DISINTEGRATING ORAL at 21:00

## 2019-07-28 RX ADMIN — SODIUM CHLORIDE, PRESERVATIVE FREE SCH ML: 5 INJECTION INTRAVENOUS at 09:00

## 2019-07-28 RX ADMIN — ACYCLOVIR SCH MG: 400 TABLET ORAL at 21:00

## 2019-07-28 RX ADMIN — DOCUSATE SODIUM SCH MG: 100 CAPSULE, LIQUID FILLED ORAL at 21:00

## 2019-07-28 RX ADMIN — HYDROCODONE BITARTRATE AND ACETAMINOPHEN PRN TAB: 10; 325 TABLET ORAL at 09:32

## 2019-07-28 RX ADMIN — METOPROLOL TARTRATE SCH MG: 25 TABLET, FILM COATED ORAL at 14:00

## 2019-07-28 RX ADMIN — MORPHINE SULFATE PRN MG: 4 INJECTION INTRAVENOUS at 13:28

## 2019-07-28 RX ADMIN — DOCUSATE SODIUM SCH MG: 100 CAPSULE, LIQUID FILLED ORAL at 09:00

## 2019-07-28 RX ADMIN — CEFTOLOZANE AND TAZOBACTAM SCH MLS/HR: 1; .5 INJECTION, POWDER, LYOPHILIZED, FOR SOLUTION INTRAVENOUS at 05:07

## 2019-07-28 RX ADMIN — VORICONAZOLE SCH MG: 200 TABLET, FILM COATED ORAL at 09:00

## 2019-07-28 RX ADMIN — LORAZEPAM PRN MG: 2 INJECTION INTRAMUSCULAR; INTRAVENOUS at 21:40

## 2019-07-28 RX ADMIN — LORAZEPAM PRN MG: 2 INJECTION INTRAMUSCULAR; INTRAVENOUS at 13:04

## 2019-07-28 RX ADMIN — ACYCLOVIR SCH MG: 400 TABLET ORAL at 09:32

## 2019-07-28 RX ADMIN — FAMOTIDINE SCH MG: 20 TABLET, FILM COATED ORAL at 21:00

## 2019-07-28 RX ADMIN — VORICONAZOLE SCH MG: 200 TABLET, FILM COATED ORAL at 21:00

## 2019-07-28 RX ADMIN — CEFTOLOZANE AND TAZOBACTAM SCH MLS/HR: 1; .5 INJECTION, POWDER, LYOPHILIZED, FOR SOLUTION INTRAVENOUS at 20:54

## 2019-07-28 RX ADMIN — CEFTOLOZANE AND TAZOBACTAM SCH MLS/HR: 1; .5 INJECTION, POWDER, LYOPHILIZED, FOR SOLUTION INTRAVENOUS at 14:35

## 2019-07-28 RX ADMIN — LORAZEPAM PRN MG: 2 INJECTION INTRAMUSCULAR; INTRAVENOUS at 10:55

## 2019-07-28 RX ADMIN — MORPHINE SULFATE PRN MG: 4 INJECTION INTRAVENOUS at 18:32

## 2019-07-29 VITALS — DIASTOLIC BLOOD PRESSURE: 81 MMHG | SYSTOLIC BLOOD PRESSURE: 141 MMHG

## 2019-07-29 VITALS — DIASTOLIC BLOOD PRESSURE: 68 MMHG | SYSTOLIC BLOOD PRESSURE: 153 MMHG

## 2019-07-29 VITALS — DIASTOLIC BLOOD PRESSURE: 83 MMHG | SYSTOLIC BLOOD PRESSURE: 134 MMHG

## 2019-07-29 VITALS — SYSTOLIC BLOOD PRESSURE: 153 MMHG | DIASTOLIC BLOOD PRESSURE: 68 MMHG

## 2019-07-29 LAB
<PLATELET ESTIMATE>: (no result)
<PLT MORPHOLOGY>: (no result)
ALBUMIN SERPL-MCNC: 2.1 G/DL (ref 3.4–5)
ALP SERPL-CCNC: 127 U/L (ref 45–117)
ALT SERPL-CCNC: 269 U/L (ref 12–78)
ANION GAP SERPL CALC-SCNC: 9 MMOL/L (ref 5–15)
ANION GAP SERPL CALC-SCNC: 9 MMOL/L (ref 5–15)
ANISOCYTOSIS BLD QL SMEAR: (no result)
BAND#(MANUAL): 0.42 X10^3/UL
BILIRUB SERPL-MCNC: 1.1 MG/DL (ref 0.2–1)
CALCIUM SERPL-MCNC: 8.9 MG/DL (ref 8.5–10.1)
CALCIUM SERPL-MCNC: 9.1 MG/DL (ref 8.5–10.1)
CHLORIDE SERPL-SCNC: 115 MMOL/L (ref 98–107)
CHLORIDE SERPL-SCNC: 116 MMOL/L (ref 98–107)
CREAT SERPL-MCNC: 0.97 MG/DL (ref 0.55–1.02)
CREAT SERPL-MCNC: 0.99 MG/DL (ref 0.55–1.02)
ERYTHROCYTE [DISTWIDTH] IN BLOOD BY AUTOMATED COUNT: 16.4 % (ref 9.6–15.2)
LYMPH#(MANUAL): 0.21 X10^3/UL (ref 1–3.4)
LYMPHS% (MANUAL): 2 % (ref 22–44)
MCH RBC QN AUTO: 31.4 PG (ref 27–34.8)
MCHC RBC AUTO-ENTMCNC: 33.4 G/DL (ref 32.4–35.8)
MCV RBC AUTO: 94.2 FL (ref 80–100)
MD: YES
MONOS#(MANUAL): 0.63 X10^3/UL (ref 0.3–2.7)
MONOS% (MANUAL): 6 % (ref 2–9)
NEUTS BAND NFR BLD: 4 % (ref 0–7)
OVALOCYTES BLD QL SMEAR: (no result)
PLATELET # BLD AUTO: 14 X10^3/UL (ref 130–400)
PMV BLD AUTO: 10 FL (ref 7.4–10.4)
PROT SERPL-MCNC: 5 G/DL (ref 6.4–8.2)
RBC # BLD AUTO: 2.63 X10^6/UL (ref 3.82–5.3)
SEG#(MANUAL): 9.24 X10^3/UL (ref 1.8–6.8)
SEGS% (MANUAL): 88 % (ref 42–75)

## 2019-07-29 RX ADMIN — SODIUM CHLORIDE AND POTASSIUM CHLORIDE SCH MLS/HR: 9; 2.98 INJECTION, SOLUTION INTRAVENOUS at 20:18

## 2019-07-29 RX ADMIN — SODIUM CHLORIDE, PRESERVATIVE FREE SCH ML: 5 INJECTION INTRAVENOUS at 22:05

## 2019-07-29 RX ADMIN — FAMOTIDINE SCH MG: 20 TABLET, FILM COATED ORAL at 09:24

## 2019-07-29 RX ADMIN — ACYCLOVIR SCH MG: 400 TABLET ORAL at 20:53

## 2019-07-29 RX ADMIN — ONDANSETRON SCH MG: 8 TABLET, ORALLY DISINTEGRATING ORAL at 09:25

## 2019-07-29 RX ADMIN — ONDANSETRON SCH MG: 8 TABLET, ORALLY DISINTEGRATING ORAL at 16:00

## 2019-07-29 RX ADMIN — MORPHINE SULFATE PRN MG: 4 INJECTION INTRAVENOUS at 15:09

## 2019-07-29 RX ADMIN — CEFTOLOZANE AND TAZOBACTAM SCH MLS/HR: 1; .5 INJECTION, POWDER, LYOPHILIZED, FOR SOLUTION INTRAVENOUS at 11:59

## 2019-07-29 RX ADMIN — METOPROLOL TARTRATE SCH MG: 25 TABLET, FILM COATED ORAL at 14:00

## 2019-07-29 RX ADMIN — DEXTROSE, SODIUM CHLORIDE, AND POTASSIUM CHLORIDE SCH MLS/HR: 5; .45; .15 INJECTION INTRAVENOUS at 04:48

## 2019-07-29 RX ADMIN — LORAZEPAM PRN MG: 2 INJECTION INTRAMUSCULAR; INTRAVENOUS at 13:31

## 2019-07-29 RX ADMIN — MORPHINE SULFATE PRN MG: 4 INJECTION INTRAVENOUS at 22:55

## 2019-07-29 RX ADMIN — DOCUSATE SODIUM SCH MG: 100 CAPSULE, LIQUID FILLED ORAL at 20:53

## 2019-07-29 RX ADMIN — MORPHINE SULFATE PRN MG: 4 INJECTION INTRAVENOUS at 03:36

## 2019-07-29 RX ADMIN — LORAZEPAM PRN MG: 2 INJECTION INTRAMUSCULAR; INTRAVENOUS at 22:05

## 2019-07-29 RX ADMIN — SODIUM CHLORIDE, PRESERVATIVE FREE SCH ML: 5 INJECTION INTRAVENOUS at 09:00

## 2019-07-29 RX ADMIN — MORPHINE SULFATE PRN MG: 4 INJECTION INTRAVENOUS at 19:11

## 2019-07-29 RX ADMIN — MORPHINE SULFATE PRN MG: 4 INJECTION INTRAVENOUS at 11:02

## 2019-07-29 RX ADMIN — FAMOTIDINE SCH MG: 20 TABLET, FILM COATED ORAL at 20:53

## 2019-07-29 RX ADMIN — METOPROLOL TARTRATE SCH MG: 25 TABLET, FILM COATED ORAL at 20:53

## 2019-07-29 RX ADMIN — ONDANSETRON SCH MG: 8 TABLET, ORALLY DISINTEGRATING ORAL at 20:53

## 2019-07-29 RX ADMIN — CEFTOLOZANE AND TAZOBACTAM SCH MLS/HR: 1; .5 INJECTION, POWDER, LYOPHILIZED, FOR SOLUTION INTRAVENOUS at 20:18

## 2019-07-29 RX ADMIN — LORAZEPAM PRN MG: 2 INJECTION INTRAMUSCULAR; INTRAVENOUS at 03:15

## 2019-07-29 RX ADMIN — MORPHINE SULFATE PRN MG: 4 INJECTION INTRAVENOUS at 03:05

## 2019-07-29 RX ADMIN — ACYCLOVIR SCH MG: 400 TABLET ORAL at 09:25

## 2019-07-29 RX ADMIN — CEFTOLOZANE AND TAZOBACTAM SCH MLS/HR: 1; .5 INJECTION, POWDER, LYOPHILIZED, FOR SOLUTION INTRAVENOUS at 04:48

## 2019-07-29 RX ADMIN — METOPROLOL TARTRATE SCH MG: 25 TABLET, FILM COATED ORAL at 05:47

## 2019-07-29 RX ADMIN — MORPHINE SULFATE PRN MG: 4 INJECTION INTRAVENOUS at 20:17

## 2019-07-29 RX ADMIN — DOCUSATE SODIUM SCH MG: 100 CAPSULE, LIQUID FILLED ORAL at 09:24

## 2019-07-29 RX ADMIN — LORAZEPAM PRN MG: 2 INJECTION INTRAMUSCULAR; INTRAVENOUS at 17:09

## 2019-07-29 RX ADMIN — LORAZEPAM PRN MG: 2 INJECTION INTRAMUSCULAR; INTRAVENOUS at 20:04

## 2019-07-30 RX ADMIN — SODIUM CHLORIDE AND POTASSIUM CHLORIDE SCH MLS/HR: 9; 2.98 INJECTION, SOLUTION INTRAVENOUS at 05:44

## 2019-07-30 RX ADMIN — METOPROLOL TARTRATE SCH MG: 25 TABLET, FILM COATED ORAL at 06:00

## 2019-07-30 RX ADMIN — CEFTOLOZANE AND TAZOBACTAM SCH MLS/HR: 1; .5 INJECTION, POWDER, LYOPHILIZED, FOR SOLUTION INTRAVENOUS at 04:18

## 2019-07-30 RX ADMIN — MORPHINE SULFATE PRN MG: 4 INJECTION INTRAVENOUS at 06:10
